# Patient Record
Sex: MALE | Race: WHITE | NOT HISPANIC OR LATINO | Employment: OTHER | URBAN - METROPOLITAN AREA
[De-identification: names, ages, dates, MRNs, and addresses within clinical notes are randomized per-mention and may not be internally consistent; named-entity substitution may affect disease eponyms.]

---

## 2017-04-17 ENCOUNTER — TELEPHONE (OUTPATIENT)
Dept: FAMILY MEDICINE | Facility: CLINIC | Age: 62
End: 2017-04-17

## 2017-04-17 DIAGNOSIS — Z12.11 SCREEN FOR COLON CANCER: Primary | ICD-10-CM

## 2017-04-17 NOTE — TELEPHONE ENCOUNTER
Patient called for Health Maintenance, agreed to FIT test screening. Order placed for provider to sign. Patient will . Per patient is requesting a call when kit is ready for .

## 2017-05-26 DIAGNOSIS — G89.29 OTHER CHRONIC PAIN: ICD-10-CM

## 2017-05-26 DIAGNOSIS — F51.01 PRIMARY INSOMNIA: ICD-10-CM

## 2017-05-26 RX ORDER — ZOLPIDEM TARTRATE 10 MG/1
10 TABLET ORAL
Qty: 30 TABLET | Refills: 0 | Status: SHIPPED | OUTPATIENT
Start: 2017-05-26 | End: 2017-07-19

## 2017-05-26 NOTE — TELEPHONE ENCOUNTER
Routing refill request to provider for review/approval because:  Drug not on the [facility] refill protocol   : 5.26.17, no problems noted    Chuyita Antonio RN, BS  Clinical Nurse Triage.

## 2017-05-26 NOTE — TELEPHONE ENCOUNTER
Controlled Substance Refill Request for zolpidem (AMBIEN) 10 MG tablet  Problem List Complete:  No     PROVIDER TO CONSIDER COMPLETION OF PROBLEM LIST AND OVERVIEW/CONTROLLED SUBSTANCE AGREEMENT    Last Written Prescription Date:  11/1/16  Last Fill Quantity: 30,   # refills: 5    Last Office Visit with [facility] primary care provider: 12/2/2016      Future Office visit:     Controlled substance agreement on file: Yes:  Date 6/23/16.     Processing:  Fax Rx to [facility] pharmacy   checked in past 6 months?  No, route to BLADIMIR Mensah  May 26, 2017  8:22 AM

## 2017-05-30 ENCOUNTER — TELEPHONE (OUTPATIENT)
Dept: FAMILY MEDICINE | Facility: CLINIC | Age: 62
End: 2017-05-30

## 2017-05-30 NOTE — TELEPHONE ENCOUNTER
Panel Management Review      Patient has the following on his problem list: None      Composite cancer screening  Chart review shows that this patient is due/due soon for the following Colonoscopy  Summary:    Patient is due/failing the following:   ACT, COLONOSCOPY and PHQ9    Action needed:   Patient needs to do ACT., Patient needs to do PHQ9. and Patient needs referral/order: colonoscopy    Type of outreach:    Phone, spoke to patient.  Patient will do the FIT test and will do the ACT questionaire at the next visit.  Did the PHQ-9 over the phone with the patient.    Questions for provider review:    None                                                                                                                                    Orion Bobo [facility]        Chart routed to none .

## 2017-05-31 DIAGNOSIS — J45.31 MILD PERSISTENT ASTHMA WITH EXACERBATION: ICD-10-CM

## 2017-05-31 ASSESSMENT — PATIENT HEALTH QUESTIONNAIRE - PHQ9: SUM OF ALL RESPONSES TO PHQ QUESTIONS 1-9: 3

## 2017-05-31 NOTE — TELEPHONE ENCOUNTER
fluticasone (FLONASE) 50 MCG/ACT spray  Last Written Prescription Date: 3/21/16  Last Fill Quantity: 16g  ,  # refills: 5   Last Office Visit with [facility], [facility] or [facility] prescribing provider:  12/2/2016                                              BLADIMIR Robbins  May 31, 2017  10:15 AM

## 2017-06-02 RX ORDER — FLUTICASONE PROPIONATE 50 MCG
SPRAY, SUSPENSION (ML) NASAL
Qty: 16 ML | Refills: 3 | Status: SHIPPED | OUTPATIENT
Start: 2017-06-02 | End: 2018-09-04

## 2017-06-02 NOTE — TELEPHONE ENCOUNTER
Prescription approved per [facility] Refill Protocol.  Yeni Loza RN, BSN

## 2017-06-14 PROCEDURE — 82274 ASSAY TEST FOR BLOOD FECAL: CPT | Performed by: FAMILY MEDICINE

## 2017-06-15 DIAGNOSIS — Z12.11 SCREEN FOR COLON CANCER: ICD-10-CM

## 2017-06-15 LAB — HEMOCCULT STL QL IA: NEGATIVE

## 2017-07-19 ENCOUNTER — OFFICE VISIT (OUTPATIENT)
Dept: FAMILY MEDICINE | Facility: CLINIC | Age: 62
End: 2017-07-19
Payer: COMMERCIAL

## 2017-07-19 ENCOUNTER — RADIANT APPOINTMENT (OUTPATIENT)
Dept: GENERAL RADIOLOGY | Facility: CLINIC | Age: 62
End: 2017-07-19
Attending: FAMILY MEDICINE
Payer: COMMERCIAL

## 2017-07-19 VITALS
HEIGHT: 74 IN | WEIGHT: 209.2 LBS | RESPIRATION RATE: 14 BRPM | BODY MASS INDEX: 26.85 KG/M2 | TEMPERATURE: 97.5 F | HEART RATE: 84 BPM | DIASTOLIC BLOOD PRESSURE: 73 MMHG | OXYGEN SATURATION: 97 % | SYSTOLIC BLOOD PRESSURE: 115 MMHG

## 2017-07-19 DIAGNOSIS — F51.01 PRIMARY INSOMNIA: ICD-10-CM

## 2017-07-19 DIAGNOSIS — M54.2 NECK PAIN: Primary | ICD-10-CM

## 2017-07-19 DIAGNOSIS — J45.30 MILD PERSISTENT CHRONIC ASTHMA WITHOUT COMPLICATION: ICD-10-CM

## 2017-07-19 DIAGNOSIS — N52.1 ERECTILE DYSFUNCTION DUE TO DISEASES CLASSIFIED ELSEWHERE: ICD-10-CM

## 2017-07-19 DIAGNOSIS — M54.40 LUMBAGO OF LUMBAR REGION WITH SCIATICA: ICD-10-CM

## 2017-07-19 PROCEDURE — 72040 X-RAY EXAM NECK SPINE 2-3 VW: CPT

## 2017-07-19 PROCEDURE — 99214 OFFICE O/P EST MOD 30 MIN: CPT | Performed by: FAMILY MEDICINE

## 2017-07-19 RX ORDER — SILDENAFIL CITRATE 20 MG/1
TABLET ORAL
Qty: 60 TABLET | Refills: 3 | Status: SHIPPED | OUTPATIENT
Start: 2017-07-19 | End: 2018-09-05

## 2017-07-19 RX ORDER — CARISOPRODOL 350 MG/1
350 TABLET ORAL 3 TIMES DAILY PRN
Qty: 30 TABLET | Refills: 0 | Status: SHIPPED | OUTPATIENT
Start: 2017-07-19 | End: 2017-12-05

## 2017-07-19 RX ORDER — HYDROCODONE BITARTRATE AND ACETAMINOPHEN 5; 325 MG/1; MG/1
1 TABLET ORAL EVERY 6 HOURS PRN
Qty: 45 TABLET | Refills: 0 | Status: SHIPPED | OUTPATIENT
Start: 2017-07-19 | End: 2018-09-04

## 2017-07-19 RX ORDER — ZOLPIDEM TARTRATE 10 MG/1
10 TABLET ORAL
Qty: 30 TABLET | Refills: 0 | Status: SHIPPED | OUTPATIENT
Start: 2017-07-19 | End: 2017-07-19

## 2017-07-19 RX ORDER — ZOLPIDEM TARTRATE 10 MG/1
10 TABLET ORAL
Qty: 30 TABLET | Refills: 3 | Status: SHIPPED | OUTPATIENT
Start: 2017-07-19 | End: 2018-09-04

## 2017-07-19 ASSESSMENT — ANXIETY QUESTIONNAIRES
1. FEELING NERVOUS, ANXIOUS, OR ON EDGE: NOT AT ALL
6. BECOMING EASILY ANNOYED OR IRRITABLE: NOT AT ALL
5. BEING SO RESTLESS THAT IT IS HARD TO SIT STILL: NOT AT ALL
2. NOT BEING ABLE TO STOP OR CONTROL WORRYING: NOT AT ALL
GAD7 TOTAL SCORE: 0
IF YOU CHECKED OFF ANY PROBLEMS ON THIS QUESTIONNAIRE, HOW DIFFICULT HAVE THESE PROBLEMS MADE IT FOR YOU TO DO YOUR WORK, TAKE CARE OF THINGS AT HOME, OR GET ALONG WITH OTHER PEOPLE: NOT DIFFICULT AT ALL
3. WORRYING TOO MUCH ABOUT DIFFERENT THINGS: NOT AT ALL
7. FEELING AFRAID AS IF SOMETHING AWFUL MIGHT HAPPEN: NOT AT ALL

## 2017-07-19 ASSESSMENT — PATIENT HEALTH QUESTIONNAIRE - PHQ9: 5. POOR APPETITE OR OVEREATING: NOT AT ALL

## 2017-07-19 NOTE — MR AVS SNAPSHOT
After Visit Summary   7/19/2017    Christopher Buchanan    MRN: [id number]           Patient Information     Date Of Birth          1955        Visit Information        Provider Department      7/19/2017 10:15 AM Florentino Chavez MD [facility]        Today's Diagnoses     Neck pain    -  1    Mild persistent chronic asthma without complication        Primary insomnia           Follow-ups after your visit        Additional Services     ASTHMA EDUCATION REFERRAL         Please be aware that coverage of these services is subject to the terms and limitations of your health insurance plan.  Call member services at your health plan with any benefit or coverage questions.      Please bring the following to your appointment:     >>   List of current medications  >>   This referral request   >>   Any documents/labs given to you for this referral  Your spacer device and/or peak meter if you have one                  Who to contact     If you have questions or need follow up information about today's clinic visit or your schedule please contact [facility] directly at [phone].  Normal or non-critical lab and imaging results will be communicated to you by MyChart, letter or phone within 4 business days after the clinic has received the results. If you do not hear from us within 7 days, please contact the clinic through [employer] or phone. If you have a critical or abnormal lab result, we will notify you by phone as soon as possible.  Submit refill requests through [employer] or call your pharmacy and they will forward the refill request to us. Please allow 3 business days for your refill to be completed.          Additional Information About Your Visit        MyChart Information     [employer] gives you secure access to your electronic health record. If you see a primary care provider, you can also send messages to your care team and make appointments. If you have questions,  "please call your primary care clinic.  If you do not have a primary care provider, please call [phone] and they will assist you.        Care EveryWhere ID     This is your Care EveryWhere ID. This could be used by other organizations to access your [facility] medical records  [id number]        Your Vitals Were     Pulse Temperature Respirations Height Pulse Oximetry BMI (Body Mass Index)    84 97.5  F (36.4  C) (Oral) 14 6' 1.75\" (1.873 m) 97% 27.04 kg/m2       Blood Pressure from Last 3 Encounters:   07/19/17 115/73   12/02/16 118/70   11/01/16 130/74    Weight from Last 3 Encounters:   07/19/17 209 lb 3.2 oz (94.9 kg)   12/02/16 214 lb (97.1 kg)   11/01/16 264 lb (119.7 kg)              We Performed the Following     ASTHMA EDUCATION REFERRAL     XR Cervical Spine 2/3 Views          Today's Medication Changes          These changes are accurate as of: 7/19/17 11:23 AM.  If you have any questions, ask your nurse or doctor.               Start taking these medicines.        Dose/Directions    carisoprodol 350 MG tablet   Commonly known as:  SOMA   Used for:  Neck pain   Started by:  Florentino Chavez MD        Dose:  350 mg   Take 1 tablet (350 mg) by mouth 3 times daily as needed for muscle spasms   Quantity:  30 tablet   Refills:  0            Where to get your medicines      Some of these will need a paper prescription and others can be bought over the counter.  Ask your nurse if you have questions.     Bring a paper prescription for each of these medications     carisoprodol 350 MG tablet    zolpidem 10 MG tablet                Primary Care Provider Office Phone # Fax #    Florentino Chavez [phone]       [address] [address]        Equal Access to Services     CAREY FOSTER: Georgie leeo Sokalie, waaxda luqadaha, qaybta kaalmada adeegyada, henrietta foster. So [facility] [phone].    ATENCIÓN: Si " francesco leong, tiene a sanabria disposición servicios gratuitos de asistencia lingüística. Jorgito ward [phone].    We comply with applicable federal civil rights laws and [place] laws. We do not discriminate on the basis of race, color, national origin, age, disability sex, sexual orientation or gender identity.            Thank you!     Thank you for choosing [facility]  for your care. Our goal is always to provide you with excellent care. Hearing back from our patients is one way we can continue to improve our services. Please take a few minutes to complete the written survey that you may receive in the mail after your visit with us. Thank you!             Your Updated Medication List - Protect others around you: Learn how to safely use, store and throw away your medicines at www.disposemymeds.org.          This list is accurate as of: 7/19/17 11:23 AM.  Always use your most recent med list.                   Brand Name Dispense Instructions for use Diagnosis    albuterol 108 (90 BASE) MCG/ACT Inhaler    albuterol    1 Inhaler    Inhale 1-2 puffs into the lungs every 4 hours as needed for shortness of breath / dyspnea    Moderate persistent chronic asthma with acute exacerbation       budesonide 180 MCG/ACT inhaler    PULMICORT FLEXHALER    1 Inhaler    Inhale 2 puffs into the lungs 2 times daily May reduce to one puff twice daily when improved    Seasonal allergic rhinitis, unspecified allergic rhinitis trigger       carisoprodol 350 MG tablet    SOMA    30 tablet    Take 1 tablet (350 mg) by mouth 3 times daily as needed for muscle spasms    Neck pain       fluticasone 50 MCG/ACT spray    FLONASE    16 mL    SHAKE WELL AND USE 2 SPRAYS IN EACH NOSTRIL DAILY    Mild persistent asthma with exacerbation       HYDROcodone-acetaminophen 5-325 MG per tablet    NORCO    45 tablet    Take 1 tablet by mouth every 6 hours as needed for moderate to severe pain    Lumbago of lumbar region with sciatica        ibuprofen 600 MG tablet    ADVIL/MOTRIN    90 tablet    Take 1 tablet (600 mg) by mouth every 6 hours as needed for moderate pain    Lumbago of lumbar region with sciatica       MELOXICAM PO      Take 15 mg by mouth daily        methocarbamol 750 MG tablet    ROBAXIN    90 tablet    Take 1 tablet (750 mg) by mouth 4 times daily as needed for muscle spasms    Lumbago of lumbar region with sciatica       sildenafil 20 MG tablet    REVATIO/VIAGRA    30 tablet    Take one or two one hour before intercourse    Erectile dysfunction due to diseases classified elsewhere       zolpidem 10 MG tablet    AMBIEN    30 tablet    Take 1 tablet (10 mg) by mouth nightly as needed SEE MD IN JUNE    Primary insomnia

## 2017-07-19 NOTE — PROGRESS NOTES
SUBJECTIVE:                                                    Christopher Buchanan is a 61 year old male who presents to clinic today for the following health issues:      Neck Pain      Duration: 3 days    Description:  Location: back of neck  Radiation: into the right shoulder and right upper arm and scapula     Intensity:  9/10    Accompanying signs and symptoms: very sharp pain    History (similar episodes/previous evaluation): yes, but not to this degree    Precipitating or alleviating factors: None    Therapies tried and outcome: Ibuprofen did not help        Patient is wondering if he should be taking the Meloxicam.    Problem list and histories reviewed & adjusted, as indicated.  Additional history:     Current Outpatient Prescriptions   Medication Sig Dispense Refill     fluticasone (FLONASE) 50 MCG/ACT spray SHAKE WELL AND USE 2 SPRAYS IN EACH NOSTRIL DAILY 16 mL 3     zolpidem (AMBIEN) 10 MG tablet Take 1 tablet (10 mg) by mouth nightly as needed SEE MD IN JUNE 30 tablet 0     sildenafil (REVATIO/VIAGRA) 20 MG tablet Take one or two one hour before intercourse 30 tablet 0     MELOXICAM PO Take 15 mg by mouth daily       budesonide (PULMICORT FLEXHALER) 180 MCG/ACT inhaler Inhale 2 puffs into the lungs 2 times daily May reduce to one puff twice daily when improved 1 Inhaler 11     albuterol (ALBUTEROL) 108 (90 BASE) MCG/ACT inhaler Inhale 1-2 puffs into the lungs every 4 hours as needed for shortness of breath / dyspnea 1 Inhaler 11     methocarbamol (ROBAXIN) 750 MG tablet Take 1 tablet (750 mg) by mouth 4 times daily as needed for muscle spasms 90 tablet 1     HYDROcodone-acetaminophen (NORCO) 5-325 MG per tablet Take 1 tablet by mouth every 6 hours as needed for moderate to severe pain 45 tablet 0     ibuprofen (ADVIL,MOTRIN) 600 MG tablet Take 1 tablet (600 mg) by mouth every 6 hours as needed for moderate pain 90 tablet 2       Reviewed and updated as needed this visit by clinical staff     Reviewed and  "updated as needed this visit by Provider         ROS:  Constitutional, HEENT, cardiovascular, pulmonary, GI, , musculoskeletal, neuro, skin, endocrine and psych systems are negative, except as otherwise noted.      OBJECTIVE:   /73 (BP Location: Left arm, Patient Position: Chair, Cuff Size: Adult Large)  Pulse 84  Temp 97.5  F (36.4  C) (Oral)  Resp 14  Ht 6' 1.75\" (1.873 m)  Wt 209 lb 3.2 oz (94.9 kg)  SpO2 97%  BMI 27.04 kg/m2  Body mass index is 27.04 kg/(m^2).  GENERAL: healthy, alert and no distress  EYES: Eyes grossly normal to inspection, PERRL and conjunctivae and sclerae normal  HENT: ear canals and TM's normal, nose and mouth without ulcers or lesions  NECK: no adenopathy, no asymmetry, masses, or scars and thyroid normal to palpation  RESP: lungs clear to auscultation - no rales, rhonchi or wheezes  CV: regular rate and rhythm, normal S1 S2, no S3 or S4, no murmur, click or rub, no peripheral edema and peripheral pulses strong  ABDOMEN: soft, nontender, no hepatosplenomegaly, no masses and bowel sounds normal  MS: no gross musculoskeletal defects noted, no edema  SKIN: no suspicious lesions or rashes  NEURO: Normal strength and tone, mentation intact and speech normal  PSYCH: mentation appears normal, affect normal/bright        ASSESSMENT/PLAN:     (M54.2) Neck pain  (primary encounter diagnosis)  Comment:   Plan: XR Cervical Spine 2/3 Views, carisoprodol         (SOMA) 350 MG tablet            (J45.30) Mild persistent chronic asthma without complication  Comment:   Plan: ASTHMA EDUCATION REFERRAL            (F51.01) Primary insomnia  Comment:   Plan: zolpidem (AMBIEN) 10 MG tablet, DISCONTINUED:         zolpidem (AMBIEN) 10 MG tablet            (N52.1) Erectile dysfunction due to diseases classified elsewhere  Comment:   Plan: sildenafil (REVATIO/VIAGRA) 20 MG tablet            (M54.40) Lumbago of lumbar region with sciatica  Comment:   Plan: HYDROcodone-acetaminophen (NORCO) 5-325 MG per "         tablet        Very intermittent use, review CSA              Current Outpatient Prescriptions   Medication     carisoprodol (SOMA) 350 MG tablet     sildenafil (REVATIO/VIAGRA) 20 MG tablet     HYDROcodone-acetaminophen (NORCO) 5-325 MG per tablet     zolpidem (AMBIEN) 10 MG tablet     fluticasone (FLONASE) 50 MCG/ACT spray     sildenafil (REVATIO/VIAGRA) 20 MG tablet     MELOXICAM PO     budesonide (PULMICORT FLEXHALER) 180 MCG/ACT inhaler     albuterol (ALBUTEROL) 108 (90 BASE) MCG/ACT inhaler     methocarbamol (ROBAXIN) 750 MG tablet     ibuprofen (ADVIL,MOTRIN) 600 MG tablet     No current facility-administered medications for this visit.      Discussed ED issues, he'll try the 20 mg viagra for cost reasons:   Erectile Dysfuntion                    Florentino Chavez MD  [facility]

## 2017-07-19 NOTE — NURSING NOTE
"Chief Complaint   Patient presents with     Pain       Initial /73 (BP Location: Left arm, Patient Position: Chair, Cuff Size: Adult Large)  Pulse 84  Temp 97.5  F (36.4  C) (Oral)  Resp 14  Ht 6' 1.75\" (1.873 m)  Wt 209 lb 3.2 oz (94.9 kg)  SpO2 97%  BMI 27.04 kg/m2 Estimated body mass index is 27.04 kg/(m^2) as calculated from the following:    Height as of this encounter: 6' 1.75\" (1.873 m).    Weight as of this encounter: 209 lb 3.2 oz (94.9 kg).  Medication Reconciliation: complete   Orion Bobo CMA       "

## 2017-07-20 ASSESSMENT — ASTHMA QUESTIONNAIRES: ACT_TOTALSCORE: 20

## 2017-07-20 ASSESSMENT — ANXIETY QUESTIONNAIRES: GAD7 TOTAL SCORE: 0

## 2017-07-21 ENCOUNTER — MYC MEDICAL ADVICE (OUTPATIENT)
Dept: FAMILY MEDICINE | Facility: CLINIC | Age: 62
End: 2017-07-21

## 2017-07-21 ENCOUNTER — TELEPHONE (OUTPATIENT)
Dept: FAMILY MEDICINE | Facility: CLINIC | Age: 62
End: 2017-07-21

## 2017-07-21 NOTE — TELEPHONE ENCOUNTER
Fax from [employer], informs revatio/viagra NOT covered, only covers for diagnosis of pulmonary HYPERTENSION, assume pt paying cash, FYI to JESSICA RIVERA MD, sent pt [employer] message informing  Alix Weaver RN, BSN  Message handled by Nurse Triage.

## 2017-07-21 NOTE — TELEPHONE ENCOUNTER
Florentino Chavez MD, see Mychart update from 7/19/17 appointment, inform pt of plan via Mychart    IMPRESSION:  Degenerative changes C4-C7, most prominent at C5-C6 where  there is disc space narrowing and anterior and posterior endplate  spurring. Some loss of normal cervical lordosis could be due to  degenerative changes, positioning or spasm.   Alix Weaver RN, BSN  Message handled by Nurse Triage.

## 2017-07-29 ENCOUNTER — MYC MEDICAL ADVICE (OUTPATIENT)
Dept: FAMILY MEDICINE | Facility: CLINIC | Age: 62
End: 2017-07-29

## 2017-07-29 ENCOUNTER — TELEPHONE (OUTPATIENT)
Dept: FAMILY MEDICINE | Facility: CLINIC | Age: 62
End: 2017-07-29

## 2017-07-29 DIAGNOSIS — M54.12 CERVICAL RADICULOPATHY: Primary | ICD-10-CM

## 2017-07-29 NOTE — TELEPHONE ENCOUNTER
"[facility] and messages below.  See below.  Thursday's message went to patient customer service and is not even viewable in chart.  Please advise.  Kaya Stockton RN    Thursday's message went to- [phone] Y    7/27/2017 11:50 AM Patient Cu* Patient Customer S*        Update referral needed?    Me        7/29/17 8:15 AM   Note      Patient calling and yelling that he is in pain and has not gotten any relief from Soma.  States he sent a message on Thursday and has not gotten a response back.  Advised we did not receive message on Thursday.  Advised we have message from 7/21/17 but states that is not the one.  Then goes into being upset he could not respond on that message.  He states it was routed somewhere but he was so upset I did not catch it.  The message is not in here.  Wanting steroids.  States is ready to go to ER.  Advised if pain is that bad LVUC is better option as ER not necessary for this situation but if in that much pain and Dr. Chavez not back til Monday this may be good option.  Patient angry he paid to see Dr. Chavez and would have to pay again and says \"I have been no help and thank you very little\" and hung-up.  OMAR Saravia Jeffrey Ralph, MD   to Christopher Buchanan           7/21/17 4:53 PM   They are describing pretty ordinary medium degenerative changes.  You could go to the Medical Spine Specialist at [facility] at any point, but flareups tend to last 6 weeks or more regardless of treatment. Time, rest, avoidence of overuse all do most of the curing and the medication just tames it down enough while we heal. If you want a referral we have good people at [facility] to be more aggressive (shots, steroids) if you want to try to push it faster. Let me know what you think..      Last read by Christopher Buchanan at 8:11 AM on 7/29/2017.             7/21/17 8:46 AM   Alix Weaver RN routed this conversation to Florentino Chavez MD Vogelgesang, Mary RN "   to Christopher Buchanan           7/21/17 8:46 AM   FYI, we have forwarded your message to Florentino Chavez MD. Also, we received a message from your prescription plan they do not cover revatio/viagra. You will have to pay cash. You are probably already aware. Thanks, Alix NELSON      Last read by Christopher Buchanan at 12:24 PM on 7/21/2017.    Alix Weaver RN        7/21/17 8:44 AM   Note       Florenitno Chavez MD, see Mychart update from 7/19/17 appointment, inform pt of plan via Mychart      IMPRESSION:  Degenerative changes C4-C7, most prominent at C5-C6 where  there is disc space narrowing and anterior and posterior endplate  spurring. Some loss of normal cervical lordosis could be due to  degenerative changes, positioning or spasm.   Alix Weaver RN, BSN  Message handled by Nurse Triage.             Christopher Buchanan   to Florentino Chavez MD           7/21/17 5:44 AM   I'm still experiencing severe pain, now extending down from my shoulder and into my arm & hand. How long until you expect this to subside and what is the longtime prognosis with the degeneration - is my spine mentioned by the radiologist in my test results?  When do I need to do follow up or is referral needed for specialist if the pain continues and is not controllable with currently prescribed medications?

## 2017-07-29 NOTE — TELEPHONE ENCOUNTER
"Patient calling and yelling that he is in pain and has not gotten any relief from Soma.  States he sent a message on Thursday and has not gotten a response back.  Advised we did not receive message on Thursday.  Advised we have message from 7/21/17 but states that is not the one.  Then goes into being upset he could not respond on that message.  He states it was routed somewhere but he was so upset I did not catch it.  The message is not in here.  Wanting steroids.  States is ready to go to ER.  Advised if pain is that bad LVUC is better option as ER not necessary for this situation but if in that much pain and Dr. Chavez not back til Monday this may be good option.  Patient angry he paid to see Dr. Chavez and would have to pay again and says \"I have been no help and thank you very little\" and hung-up.  OMAR Saravia Jeffrey Ralph, MD   to Christopher Buchanan           7/21/17 4:53 PM   They are describing pretty ordinary medium degenerative changes.  You could go to the Medical Spine Specialist at [facility] at any point, but flareups tend to last 6 weeks or more regardless of treatment. Time, rest, avoidence of overuse all do most of the curing and the medication just tames it down enough while we heal. If you want a referral we have good people at [facility] to be more aggressive (shots, steroids) if you want to try to push it faster. Let me know what you think..      Last read by Christopher Buchanan at 8:11 AM on 7/29/2017.             7/21/17 8:46 AM   Alix Weaver RN routed this conversation to Florentino Chavez MD Vogelgesang, Mary, RN   to Christopher Buchanan           7/21/17 8:46 AM   FYI, we have forwarded your message to Florentino Chavez MD. Also, we received a message from your prescription plan they do not cover revatio/viagra. You will have to pay cash. You are probably already aware. Caio, Alix NELSON      Last read by Christopher Buchanan at 12:24 PM on 7/21/2017.    Alix Weaver RN        " 7/21/17 8:44 AM   Note      Florentino Chavez MD, see Mychart update from 7/19/17 appointment, inform pt of plan via Mychart     IMPRESSION:  Degenerative changes C4-C7, most prominent at C5-C6 where  there is disc space narrowing and anterior and posterior endplate  spurring. Some loss of normal cervical lordosis could be due to  degenerative changes, positioning or spasm.   Alix Weaver, RN, BSN  Message handled by Nurse Triage.            Christopher Buchanan   to Florentino Chavez MD           7/21/17 5:44 AM   I'm still experiencing severe pain, now extending down from my shoulder and into my arm & hand. How long until you expect this to subside and what is the longtime prognosis with the degeneration - is my spine mentioned by the radiologist in my test results?  When do I need to do follow up or is referral needed for specialist if the pain continues and is not controllable with currently prescribed medications?

## 2017-07-31 RX ORDER — PREDNISONE 20 MG/1
40 TABLET ORAL DAILY
Qty: 18 TABLET | Refills: 0 | Status: SHIPPED | OUTPATIENT
Start: 2017-07-31 | End: 2017-12-05

## 2017-08-01 NOTE — TELEPHONE ENCOUNTER
Clarification to [place] on prednisone rx, consulted with Dr Chavez, should be 5 days at 2 tabs, 5 days at 1 tab then 5 days for 1/2 tab  Chuyita Antonio RN, BS  Clinical Nurse Triage.

## 2017-10-24 DIAGNOSIS — M54.40 LUMBAGO OF LUMBAR REGION WITH SCIATICA: ICD-10-CM

## 2017-10-24 RX ORDER — METHOCARBAMOL 750 MG/1
TABLET, FILM COATED ORAL
Qty: 90 TABLET | Refills: 0 | Status: SHIPPED | OUTPATIENT
Start: 2017-10-24 | End: 2017-12-05

## 2017-10-24 NOTE — TELEPHONE ENCOUNTER
Routing refill request to provider for review/approval because:  Drug not on the [facility] refill protocol     Bonnie Buck RN    Methocarbamol   Last Written Prescription Date: 7/6/16  Last Quantity: 90, # refills: 1  Last Office Visit with [facility], [facility] or [facility] prescribing provider: 7/19/17       Creatinine   Date Value Ref Range Status   12/07/2016 0.94 0.66 - 1.25 mg/dL Final     Lab Results   Component Value Date    AST 14 12/07/2016     Lab Results   Component Value Date    ALT 27 12/07/2016     BP Readings from Last 3 Encounters:   07/19/17 115/73   12/02/16 118/70   11/01/16 130/74

## 2017-11-02 ENCOUNTER — TRANSFERRED RECORDS (OUTPATIENT)
Dept: HEALTH INFORMATION MANAGEMENT | Facility: CLINIC | Age: 62
End: 2017-11-02

## 2017-12-05 ENCOUNTER — OFFICE VISIT (OUTPATIENT)
Dept: FAMILY MEDICINE | Facility: CLINIC | Age: 62
End: 2017-12-05
Payer: COMMERCIAL

## 2017-12-05 VITALS
WEIGHT: 211.1 LBS | TEMPERATURE: 98 F | DIASTOLIC BLOOD PRESSURE: 78 MMHG | HEIGHT: 74 IN | SYSTOLIC BLOOD PRESSURE: 138 MMHG | OXYGEN SATURATION: 97 % | BODY MASS INDEX: 27.09 KG/M2 | RESPIRATION RATE: 14 BRPM | HEART RATE: 53 BPM

## 2017-12-05 DIAGNOSIS — M54.2 NECK PAIN: Primary | ICD-10-CM

## 2017-12-05 PROCEDURE — 99214 OFFICE O/P EST MOD 30 MIN: CPT | Performed by: FAMILY MEDICINE

## 2017-12-05 RX ORDER — CYCLOBENZAPRINE HCL 10 MG
10 TABLET ORAL 3 TIMES DAILY PRN
Qty: 90 TABLET | Refills: 1 | Status: SHIPPED | OUTPATIENT
Start: 2017-12-05 | End: 2018-09-04

## 2017-12-05 NOTE — MR AVS SNAPSHOT
"              After Visit Summary   12/5/2017    Christopher Buchanan    MRN: [id number]           Patient Information     Date Of Birth          1955        Visit Information        Provider Department      12/5/2017 3:30 PM Florentino Chavez MD [facility]        Today's Diagnoses     Neck pain    -  1       Follow-ups after your visit        Who to contact     If you have questions or need follow up information about today's clinic visit or your schedule please contact [facility] directly at [phone].  Normal or non-critical lab and imaging results will be communicated to you by MyChart, letter or phone within 4 business days after the clinic has received the results. If you do not hear from us within 7 days, please contact the clinic through [employer] or phone. If you have a critical or abnormal lab result, we will notify you by phone as soon as possible.  Submit refill requests through [employer] or call your pharmacy and they will forward the refill request to us. Please allow 3 business days for your refill to be completed.          Additional Information About Your Visit        MyChart Information     [employer] gives you secure access to your electronic health record. If you see a primary care provider, you can also send messages to your care team and make appointments. If you have questions, please call your primary care clinic.  If you do not have a primary care provider, please call [phone] and they will assist you.        Care EveryWhere ID     This is your Care EveryWhere ID. This could be used by other organizations to access your [place] medical records  [id number]        Your Vitals Were     Pulse Temperature Respirations Height Pulse Oximetry BMI (Body Mass Index)    53 98  F (36.7  C) (Oral) 14 6' 1.75\" (1.873 m) 97% 27.29 kg/m2       Blood Pressure from Last 3 Encounters:   12/05/17 159/71   07/19/17 115/73   12/02/16 118/70    Weight from Last 3 " Encounters:   12/05/17 211 lb 1.6 oz (95.8 kg)   07/19/17 209 lb 3.2 oz (94.9 kg)   12/02/16 214 lb (97.1 kg)              Today, you had the following     No orders found for display         Today's Medication Changes          These changes are accurate as of: 12/5/17  4:08 PM.  If you have any questions, ask your nurse or doctor.               Start taking these medicines.        Dose/Directions    cyclobenzaprine 10 MG tablet   Commonly known as:  FLEXERIL   Used for:  Neck pain   Started by:  Floretnino Chavez MD        Dose:  10 mg   Take 1 tablet (10 mg) by mouth 3 times daily as needed for muscle spasms   Quantity:  90 tablet   Refills:  1       nabumetone 750 MG tablet   Commonly known as:  RELAFEN   Used for:  Neck pain   Started by:  Florentino Chavez MD        Dose:  750 mg   Take 1 tablet (750 mg) by mouth 2 times daily as needed for moderate pain   Quantity:  60 tablet   Refills:  1         Stop taking these medicines if you haven't already. Please contact your care team if you have questions.     carisoprodol 350 MG tablet   Commonly known as:  SOMA   Stopped by:  Florentino Chavez MD           methocarbamol 750 MG tablet   Commonly known as:  ROBAXIN   Stopped by:  Florentino Chavez MD           predniSONE 20 MG tablet   Commonly known as:  DELTASONE   Stopped by:  Florentino Chavez MD                Where to get your medicines      These medications were sent to [facility] [address] [place] [address] AT [address]  [address] [place] [id number]     Phone:  [phone]     cyclobenzaprine 10 MG tablet    nabumetone 750 MG tablet                Primary Care Provider Office Phone # Fax #    Florentino Chavez [phone] [address] [facility] [address]        Equal Access to Services     CAREY SHAVER AH: Georgie Bran, waaxda luqadaha, qaybta kaalrandall schofield, henrietta jung  mynorjonna dougjohn kourtneyjosefina laharmonycolby ah. So [facility] [phone].    ATENCIÓN: Si alliela salo, tiene a sanabria disposición servicios gratuitos de asistencia lingüística. Jorgito al [phone].    We comply with applicable federal civil rights laws and [place] laws. We do not discriminate on the basis of race, color, national origin, age, disability, sex, sexual orientation, or gender identity.            Thank you!     Thank you for choosing [facility]  for your care. Our goal is always to provide you with excellent care. Hearing back from our patients is one way we can continue to improve our services. Please take a few minutes to complete the written survey that you may receive in the mail after your visit with us. Thank you!             Your Updated Medication List - Protect others around you: Learn how to safely use, store and throw away your medicines at www.disposemymeds.org.          This list is accurate as of: 12/5/17  4:08 PM.  Always use your most recent med list.                   Brand Name Dispense Instructions for use Diagnosis    albuterol 108 (90 BASE) MCG/ACT Inhaler    PROAIR HFA    1 Inhaler    Inhale 1-2 puffs into the lungs every 4 hours as needed for shortness of breath / dyspnea    Moderate persistent chronic asthma with acute exacerbation       budesonide 180 MCG/ACT inhaler    PULMICORT FLEXHALER    1 Inhaler    Inhale 2 puffs into the lungs 2 times daily May reduce to one puff twice daily when improved    Seasonal allergic rhinitis, unspecified allergic rhinitis trigger       cyclobenzaprine 10 MG tablet    FLEXERIL    90 tablet    Take 1 tablet (10 mg) by mouth 3 times daily as needed for muscle spasms    Neck pain       fluticasone 50 MCG/ACT spray    FLONASE    16 mL    SHAKE WELL AND USE 2 SPRAYS IN EACH NOSTRIL DAILY    Mild persistent asthma with exacerbation       HYDROcodone-acetaminophen 5-325 MG per tablet    NORCO    45 tablet    Take 1 tablet by mouth every 6 hours as  needed for moderate to severe pain    Lumbago of lumbar region with sciatica       ibuprofen 600 MG tablet    ADVIL/MOTRIN    90 tablet    Take 1 tablet (600 mg) by mouth every 6 hours as needed for moderate pain    Lumbago of lumbar region with sciatica       MELOXICAM PO      Take 15 mg by mouth daily        nabumetone 750 MG tablet    RELAFEN    60 tablet    Take 1 tablet (750 mg) by mouth 2 times daily as needed for moderate pain    Neck pain       * sildenafil 20 MG tablet    REVATIO    30 tablet    Take one or two one hour before intercourse    Erectile dysfunction due to diseases classified elsewhere       * sildenafil 20 MG tablet    REVATIO    60 tablet    Take one to 3 tablets one hour before sex    Erectile dysfunction due to diseases classified elsewhere       zolpidem 10 MG tablet    AMBIEN    30 tablet    Take 1 tablet (10 mg) by mouth nightly as needed SEE MD IN JUNE    Primary insomnia       * Notice:  This list has 2 medication(s) that are the same as other medications prescribed for you. Read the directions carefully, and ask your doctor or other care provider to review them with you.

## 2017-12-05 NOTE — PROGRESS NOTES
"  SUBJECTIVE:   Christopher Buchanan is a 62 year old male who presents to clinic today for the following health issues:      Patient is here for multiple concerns including his back, hip, and left knee.  He would like to discuss cervical surgery and get Dr. Chavez's opinion on it.    Back Subjective:         Symptoms began:   1 year(s) ago       Symptoms changing:  onset gradual and are worse.                  Location:  neck right region       Radiation to right arm weak triceps and pain and tingling        At worst a 7 on a scale of 1-10.         Personal hx of back pain is:  recurrent self limited episodes of low back pain in the past.       Pain is exacerbated by: lifting.       Pain is relieved by: rest.       Associated sx include: tingling.       Previous plain films obtained: Yes.        Results: ddd, mri =disk.       Red flag symptoms: negative, weakness.           ROS:  Constitutional, HEENT, cardiovascular, pulmonary, GI, , musculoskeletal, neuro, skin, endocrine and psych systems are negative, except as otherwise noted.      OBJECTIVE:   /78  Pulse 53  Temp 98  F (36.7  C) (Oral)  Resp 14  Ht 6' 1.75\" (1.873 m)  Wt 211 lb 1.6 oz (95.8 kg)  SpO2 97%  BMI 27.29 kg/m2  Body mass index is 27.29 kg/(m^2).  GENERAL: healthy, alert and no distress  EYES: Eyes grossly normal to inspection, PERRL and conjunctivae and sclerae normal  HENT: ear canals and TM's normal, nose and mouth without ulcers or lesions  NECK: no adenopathy, no asymmetry, masses, or scars and thyroid normal to palpation  RESP: lungs clear to auscultation - no rales, rhonchi or wheezes  CV: regular rate and rhythm, normal S1 S2, no S3 or S4, no murmur, click or rub, no peripheral edema and peripheral pulses strong  ABDOMEN: soft, nontender, no hepatosplenomegaly, no masses and bowel sounds normal  MS: no gross musculoskeletal defects noted, no edema  SKIN: no suspicious lesions or rashes  NEURO: Normal strength and tone, mentation " intact and speech normal  PSYCH: mentation appears normal, affect normal/bright    Diagnostic Test Results:  Reviewed his mri and [facility] results: cervical disk herniation    ASSESSMENT/PLAN:       (M54.2) Neck pain  (primary encounter diagnosis)  Comment:   Plan: cyclobenzaprine (FLEXERIL) 10 MG tablet,         nabumetone (RELAFEN) 750 MG tablet        With cervical disk will get surgery in January       Florentino Chavez MD  [facility]

## 2017-12-11 ENCOUNTER — MYC MEDICAL ADVICE (OUTPATIENT)
Dept: FAMILY MEDICINE | Facility: CLINIC | Age: 62
End: 2017-12-11

## 2017-12-11 NOTE — TELEPHONE ENCOUNTER
JESSICA RIVERA MD, see Mychart questions and advise    Alix Weaver RN, BSN  Message handled by Nurse Triage.

## 2018-01-08 ENCOUNTER — OFFICE VISIT (OUTPATIENT)
Dept: FAMILY MEDICINE | Facility: CLINIC | Age: 63
End: 2018-01-08
Payer: COMMERCIAL

## 2018-01-08 VITALS
SYSTOLIC BLOOD PRESSURE: 118 MMHG | BODY MASS INDEX: 27.4 KG/M2 | HEIGHT: 74 IN | TEMPERATURE: 97.8 F | WEIGHT: 213.5 LBS | OXYGEN SATURATION: 96 % | RESPIRATION RATE: 16 BRPM | HEART RATE: 95 BPM | DIASTOLIC BLOOD PRESSURE: 72 MMHG

## 2018-01-08 DIAGNOSIS — Z01.818 PREOP GENERAL PHYSICAL EXAM: Primary | ICD-10-CM

## 2018-01-08 LAB
ANION GAP SERPL CALCULATED.3IONS-SCNC: 4 MMOL/L (ref 3–14)
BUN SERPL-MCNC: 16 MG/DL (ref 7–30)
CALCIUM SERPL-MCNC: 8.6 MG/DL (ref 8.5–10.1)
CHLORIDE SERPL-SCNC: 104 MMOL/L (ref 94–109)
CO2 SERPL-SCNC: 32 MMOL/L (ref 20–32)
CREAT SERPL-MCNC: 0.87 MG/DL (ref 0.66–1.25)
ERYTHROCYTE [DISTWIDTH] IN BLOOD BY AUTOMATED COUNT: 12.5 % (ref 10–15)
GFR SERPL CREATININE-BSD FRML MDRD: 88 ML/MIN/1.7M2
GLUCOSE SERPL-MCNC: 77 MG/DL (ref 70–99)
HCT VFR BLD AUTO: 44.6 % (ref 40–53)
HGB BLD-MCNC: 15.2 G/DL (ref 13.3–17.7)
MCH RBC QN AUTO: 33.3 PG (ref 26.5–33)
MCHC RBC AUTO-ENTMCNC: 34.1 G/DL (ref 31.5–36.5)
MCV RBC AUTO: 98 FL (ref 78–100)
PLATELET # BLD AUTO: 197 10E9/L (ref 150–450)
POTASSIUM SERPL-SCNC: 4.2 MMOL/L (ref 3.4–5.3)
RBC # BLD AUTO: 4.56 10E12/L (ref 4.4–5.9)
SODIUM SERPL-SCNC: 140 MMOL/L (ref 133–144)
WBC # BLD AUTO: 8.4 10E9/L (ref 4–11)

## 2018-01-08 PROCEDURE — 36415 COLL VENOUS BLD VENIPUNCTURE: CPT | Performed by: FAMILY MEDICINE

## 2018-01-08 PROCEDURE — 99214 OFFICE O/P EST MOD 30 MIN: CPT | Performed by: FAMILY MEDICINE

## 2018-01-08 PROCEDURE — 80048 BASIC METABOLIC PNL TOTAL CA: CPT | Performed by: FAMILY MEDICINE

## 2018-01-08 PROCEDURE — 93000 ELECTROCARDIOGRAM COMPLETE: CPT | Performed by: FAMILY MEDICINE

## 2018-01-08 PROCEDURE — 85027 COMPLETE CBC AUTOMATED: CPT | Performed by: FAMILY MEDICINE

## 2018-01-08 NOTE — MR AVS SNAPSHOT
After Visit Summary   1/8/2018    Christopher Buchanan    MRN: [id number]           Patient Information     Date Of Birth          1955        Visit Information        Provider Department      1/8/2018 1:30 PM Florentino Chavez MD [facility]        Today's Diagnoses     Preop general physical exam    -  1      Care Instructions      Before Your Surgery      Call your surgeon if there is any change in your health. This includes signs of a cold or flu (such as a sore throat, runny nose, cough, rash or fever).    Do not smoke, drink alcohol or take over the counter medicine (unless your surgeon or primary care doctor tells you to) for the 24 hours before and after surgery.    If you take prescribed drugs: Follow your doctor s orders about which medicines to take and which to stop until after surgery.    Eating and drinking prior to surgery: follow the instructions from your surgeon    Take a shower or bath the night before surgery. Use the soap your surgeon gave you to gently clean your skin. If you do not have soap from your surgeon, use your regular soap. Do not shave or scrub the surgery site.  Wear clean pajamas and have clean sheets on your bed.           Follow-ups after your visit        Who to contact     If you have questions or need follow up information about today's clinic visit or your schedule please contact [facility] directly at [phone].  Normal or non-critical lab and imaging results will be communicated to you by MyChart, letter or phone within 4 business days after the clinic has received the results. If you do not hear from us within 7 days, please contact the clinic through MyChart or phone. If you have a critical or abnormal lab result, we will notify you by phone as soon as possible.  Submit refill requests through [employer] or call your pharmacy and they will forward the refill request to us. Please allow 3 business days for your refill  "to be completed.          Additional Information About Your Visit        MyChart Information     [employer] gives you secure access to your electronic health record. If you see a primary care provider, you can also send messages to your care team and make appointments. If you have questions, please call your primary care clinic.  If you do not have a primary care provider, please call [phone] and they will assist you.        Care EveryWhere ID     This is your Care EveryWhere ID. This could be used by other organizations to access your [place] medical records  [id number]        Your Vitals Were     Pulse Temperature Respirations Height Pulse Oximetry BMI (Body Mass Index)    95 97.8  F (36.6  C) (Oral) 16 6' 1.75\" (1.873 m) 96% 27.6 kg/m2       Blood Pressure from Last 3 Encounters:   01/08/18 118/72   12/05/17 138/78   07/19/17 115/73    Weight from Last 3 Encounters:   01/08/18 213 lb 8 oz (96.8 kg)   12/05/17 211 lb 1.6 oz (95.8 kg)   07/19/17 209 lb 3.2 oz (94.9 kg)              We Performed the Following     Basic metabolic panel  (Ca, Cl, CO2, Creat, Gluc, K, Na, BUN)     CBC with platelets     EKG 12-lead complete w/read - Clinics        Primary Care Provider Office Phone # Fax #    Florentino Silvino Chavez [phone] [address] [facility] [address]        Equal Access to Services     CAREY SHAVER : Hadii aad ku hadasho Soomaali, waaxda luqadaha, qaybta kaalmada dougegyalma, henrietta arellano . So [facility] [phone].    ATENCIÓN: Si habla salo, tiene a sanabria disposición servicios gratuitos de asistencia lingüística. Llame al [phone].    We comply with applicable federal civil rights laws and [place] laws. We do not discriminate on the basis of race, color, national origin, age, disability, sex, sexual orientation, or gender identity.            Thank you!     Thank you for choosing [facility]  for your care. Our goal is always to " provide you with excellent care. Hearing back from our patients is one way we can continue to improve our services. Please take a few minutes to complete the written survey that you may receive in the mail after your visit with us. Thank you!             Your Updated Medication List - Protect others around you: Learn how to safely use, store and throw away your medicines at www.disposemymeds.org.          This list is accurate as of: 1/8/18  2:03 PM.  Always use your most recent med list.                   Brand Name Dispense Instructions for use Diagnosis    albuterol 108 (90 BASE) MCG/ACT Inhaler    PROAIR HFA    1 Inhaler    Inhale 1-2 puffs into the lungs every 4 hours as needed for shortness of breath / dyspnea    Moderate persistent chronic asthma with acute exacerbation       ALEVE PO      Take 220 mg by mouth        budesonide 180 MCG/ACT inhaler    PULMICORT FLEXHALER    1 Inhaler    Inhale 2 puffs into the lungs 2 times daily May reduce to one puff twice daily when improved    Seasonal allergic rhinitis, unspecified allergic rhinitis trigger       cyclobenzaprine 10 MG tablet    FLEXERIL    90 tablet    Take 1 tablet (10 mg) by mouth 3 times daily as needed for muscle spasms    Neck pain       fluticasone 50 MCG/ACT spray    FLONASE    16 mL    SHAKE WELL AND USE 2 SPRAYS IN EACH NOSTRIL DAILY    Mild persistent asthma with exacerbation       HYDROcodone-acetaminophen 5-325 MG per tablet    NORCO    45 tablet    Take 1 tablet by mouth every 6 hours as needed for moderate to severe pain    Lumbago of lumbar region with sciatica       ibuprofen 600 MG tablet    ADVIL/MOTRIN    90 tablet    Take 1 tablet (600 mg) by mouth every 6 hours as needed for moderate pain    Lumbago of lumbar region with sciatica       MELOXICAM PO      Take 15 mg by mouth daily        nabumetone 750 MG tablet    RELAFEN    60 tablet    Take 1 tablet (750 mg) by mouth 2 times daily as needed for moderate pain    Neck pain       *  sildenafil 20 MG tablet    REVATIO    30 tablet    Take one or two one hour before intercourse    Erectile dysfunction due to diseases classified elsewhere       * sildenafil 20 MG tablet    REVATIO    60 tablet    Take one to 3 tablets one hour before sex    Erectile dysfunction due to diseases classified elsewhere       zolpidem 10 MG tablet    AMBIEN    30 tablet    Take 1 tablet (10 mg) by mouth nightly as needed SEE MD IN JUNE    Primary insomnia       * Notice:  This list has 2 medication(s) that are the same as other medications prescribed for you. Read the directions carefully, and ask your doctor or other care provider to review them with you.

## 2018-01-08 NOTE — PROGRESS NOTES
[facility]  [address] [facility] [address]  [phone]  Dept: [phone]    PRE-OP EVALUATION:  Today's date: 2018    Christopher Buchanan (: 1955) presents for pre-operative evaluation assessment as requested by Dr. Eduardo Muñiz .  He requires evaluation and anesthesia risk assessment prior to undergoing surgery/procedure for treatment of  .  Proposed procedure: Anterior    Date of Surgery/ Procedure: 18  Time of Surgery/ Procedure: 7:30   Hospital/Surgical Facility: [facility]   Fax number for surgical facility: [phone]  Primary Physician: Florentino Chavez  Type of Anesthesia Anticipated: to be determined    Patient has a Health Care Directive or Living Will:  YES     Preop Questions 2018   1.  Do you have a history of heart attack, stroke, stent, bypass or surgery on an artery in the head, neck, heart or legs? No   2.  Do you ever have any pain or discomfort in your chest? No   3.  Do you have a history of  Heart Failure? No   4.   Are you troubled by shortness of breath when:  walking on a level surface, or up a slight hill, or at night? No   5.  Do you currently have a cold, bronchitis or other respiratory infection? No   6.  Do you have a cough, shortness of breath, or wheezing? No   7.  Do you sometimes get pains in the calves of your legs when you walk? No   8. Do you or anyone in your family have previous history of blood clots? No   9.  Do you or does anyone in your family have a serious bleeding problem such as prolonged bleeding following surgeries or cuts? No   10. Have you ever had problems with anemia or been told to take iron pills? No   11. Have you had any abnormal blood loss such as black, tarry or bloody stools? No   12. Have you ever had a blood transfusion? No   13. Have you or any of your relatives ever had problems with anesthesia? No   14. Do you have sleep apnea, excessive snoring or daytime drowsiness? YES -     15. Do you have any prosthetic heart valves? No   16. Do you have prosthetic joints? No           HPI:                                                      Brief HPI related to upcoming procedure:           MEDICAL HISTORY:                                                    Patient Active Problem List    Diagnosis Date Noted     Paroxysmal supraventricular tachycardia (H) 12/02/2016     Priority: Medium     Hip joint painful on movement, unspecified laterality 02/18/2016     Priority: Medium     Other chronic pain 08/31/2015     Priority: Medium     Patient is followed by JESSICA RIVERA for ongoing prescription of pain medication.  All refills should be approved by this provider, or covering partner.    Medication(s): HYDROcodone-acetaminophen (NORCO) 5-325 MG per tablet.   Maximum quantity per month: 30  Clinic visit frequency required: Q 4 months     Controlled substance agreement on file: Yes       Date(s): 8/31/15    Pain Clinic evaluation in the past: No    DIRE Total Score(s):  No flowsheet data found.    Last [facility] website verification: done 5.26.17   [email or web address]         Right low back pain, with sciatica presence unspecified 07/10/2015     Priority: Medium     Erectile dysfunction 09/30/2011     Priority: Medium     Hydrocele, left 03/31/2011     Priority: Medium     Prostatism      Priority: Medium     Moderate persistent asthma 03/21/2011     Priority: Medium     CARDIOVASCULAR SCREENING; LDL GOAL LESS THAN 160 10/31/2010     Priority: Medium     Allergic rhinitis 05/08/2003     Priority: Medium     Problem list name updated by automated process. Provider to review       Generalized osteoarthrosis, unspecified site 05/08/2003     Priority: Medium     Esophageal reflux 05/08/2003     Priority: Medium      Past Medical History:   Diagnosis Date     ALLERGIC RHINITIS NOS 5/8/2003     CARDIOVASCULAR SCREENING; LDL GOAL LESS THAN 160 10/31/2010     Erectile dysfunction 9/30/2011      Esophageal reflux 5/8/2003     GENERAL OSTEOARTHROSIS 5/8/2003     Hydrocele      Hydrocele, left 3/31/2011     Low back pain 9/30/2011     Moderate persistent asthma 3/21/2011     Prostatism      Past Surgical History:   Procedure Laterality Date     C SPLINT      right thumb     EYE SURGERY      YAG procedure     Current Outpatient Prescriptions   Medication Sig Dispense Refill     cyclobenzaprine (FLEXERIL) 10 MG tablet Take 1 tablet (10 mg) by mouth 3 times daily as needed for muscle spasms 90 tablet 1     nabumetone (RELAFEN) 750 MG tablet Take 1 tablet (750 mg) by mouth 2 times daily as needed for moderate pain 60 tablet 1     sildenafil (REVATIO/VIAGRA) 20 MG tablet Take one to 3 tablets one hour before sex 60 tablet 3     HYDROcodone-acetaminophen (NORCO) 5-325 MG per tablet Take 1 tablet by mouth every 6 hours as needed for moderate to severe pain 45 tablet 0     zolpidem (AMBIEN) 10 MG tablet Take 1 tablet (10 mg) by mouth nightly as needed SEE MD IN JUNE 30 tablet 3     fluticasone (FLONASE) 50 MCG/ACT spray SHAKE WELL AND USE 2 SPRAYS IN EACH NOSTRIL DAILY 16 mL 3     sildenafil (REVATIO/VIAGRA) 20 MG tablet Take one or two one hour before intercourse 30 tablet 0     MELOXICAM PO Take 15 mg by mouth daily       budesonide (PULMICORT FLEXHALER) 180 MCG/ACT inhaler Inhale 2 puffs into the lungs 2 times daily May reduce to one puff twice daily when improved 1 Inhaler 11     albuterol (ALBUTEROL) 108 (90 BASE) MCG/ACT inhaler Inhale 1-2 puffs into the lungs every 4 hours as needed for shortness of breath / dyspnea 1 Inhaler 11     ibuprofen (ADVIL,MOTRIN) 600 MG tablet Take 1 tablet (600 mg) by mouth every 6 hours as needed for moderate pain 90 tablet 2     OTC products: None, except as noted above    Allergies   Allergen Reactions     Tizanidine Shortness Of Breath     Penicillins      Noted in 4/21/08 ER, as possible      Latex Allergy: NO    Social History   Substance Use Topics     Smoking status: Never  "Smoker     Smokeless tobacco: Never Used     Alcohol use Yes      Comment: 1 beer daily     History   Drug Use No       REVIEW OF SYSTEMS:                                                    C: NEGATIVE for fever, chills, change in weight  I: NEGATIVE for worrisome rashes, moles or lesions  E: NEGATIVE for vision changes or irritation  E/M: NEGATIVE for ear, mouth and throat problems  R: NEGATIVE for significant cough or SOB  CV: NEGATIVE for chest pain, palpitations or peripheral edema  GI: NEGATIVE for nausea, abdominal pain, heartburn, or change in bowel habits  : NEGATIVE for frequency, dysuria, or hematuria  M: NEGATIVE for significant arthralgias or myalgia  N: NEGATIVE for weakness, dizziness or paresthesias  E: NEGATIVE for temperature intolerance, skin/hair changes  H: NEGATIVE for bleeding problems  P: NEGATIVE for changes in mood or affect  /72 (BP Location: Right arm, Patient Position: Chair, Cuff Size: Adult Large)  Pulse 95  Temp 97.8  F (36.6  C) (Oral)  Resp 16  Ht 6' 1.75\" (1.873 m)  Wt 213 lb 8 oz (96.8 kg)  SpO2 96%  BMI 27.6 kg/m2    /72 (BP Location: Right arm, Patient Position: Chair, Cuff Size: Adult Large)  Pulse 95  Temp 97.8  F (36.6  C) (Oral)  Resp 16  Ht 6' 1.75\" (1.873 m)  Wt 213 lb 8 oz (96.8 kg)  SpO2 96%  BMI 27.6 kg/m2    GENERAL APPEARANCE: healthy, alert and no distress     EYES: EOMI,  PERRL     HENT: ear canals and TM's normal and nose and mouth without ulcers or lesions     NECK: no adenopathy, no asymmetry, masses, or scars and thyroid normal to palpation     RESP: lungs clear to auscultation - no rales, rhonchi or wheezes     CV: regular rates and rhythm, normal S1 S2, no S3 or S4 and no murmur, click or rub     ABDOMEN:  soft, nontender, no HSM or masses and bowel sounds normal     MS: extremities normal- no gross deformities noted, no evidence of inflammation in joints, FROM in all extremities.     SKIN: no suspicious lesions or rashes     NEURO: " Normal strength and tone, sensory exam grossly normal, mentation intact and speech normal     PSYCH: mentation appears normal. and affect normal/bright     LYMPHATICS: No axillary, cervical, or supraclavicular nodes    DIAGNOSTICS:                                                    EKG: appears normal, NSR, normal axis, normal intervals, no acute ST/T changes c/w ischemia, no LVH by voltage criteria, unchanged from previous tracings    Recent Labs   Lab Test  12/07/16   0844 12/10/15  07/22/15   0929   INR   --   0.93   --    NA  141   --   139   POTASSIUM  4.2  4.1  4.4   CR  0.94  0.97  0.88   A1C   --   5.2   --         IMPRESSION:                                                    Reason for surgery/procedure: neck decompression  Diagnosis/reason for consult: preop     The proposed surgical procedure is considered LOW risk.    REVISED CARDIAC RISK INDEX  The patient has the following serious cardiovascular risks for perioperative complications such as (MI, PE, VFib and 3  AV Block):  No serious cardiac risks  INTERPRETATION: 1 risks: Class II (low risk - 0.9% complication rate)    The patient has the following additional risks for perioperative complications:  No identified additional risks  Cervical spondylosis     RECOMMENDATIONS:                                                      Fit for surgery     --Patient is to take all scheduled medications on the day of surgery EXCEPT for modifications listed below.    APPROVAL GIVEN to proceed with proposed procedure, without further diagnostic evaluation       Signed Electronically by: Florentino Chavez MD    Copy of this evaluation report is provided to requesting physician.    [facility] Preop Guidelines

## 2018-04-19 ENCOUNTER — MYC MEDICAL ADVICE (OUTPATIENT)
Dept: FAMILY MEDICINE | Facility: CLINIC | Age: 63
End: 2018-04-19

## 2018-04-19 NOTE — TELEPHONE ENCOUNTER
Old customer service message, see visits after, no action needed  Alix Weaver RN, BSN  Message handled by Nurse Triage.

## 2018-05-17 ENCOUNTER — TRANSFERRED RECORDS (OUTPATIENT)
Dept: HEALTH INFORMATION MANAGEMENT | Facility: CLINIC | Age: 63
End: 2018-05-17

## 2018-09-04 ENCOUNTER — MYC MEDICAL ADVICE (OUTPATIENT)
Dept: FAMILY MEDICINE | Facility: CLINIC | Age: 63
End: 2018-09-04

## 2018-09-04 ENCOUNTER — OFFICE VISIT (OUTPATIENT)
Dept: FAMILY MEDICINE | Facility: CLINIC | Age: 63
End: 2018-09-04
Payer: COMMERCIAL

## 2018-09-04 VITALS
HEART RATE: 78 BPM | SYSTOLIC BLOOD PRESSURE: 138 MMHG | BODY MASS INDEX: 28 KG/M2 | WEIGHT: 211.3 LBS | DIASTOLIC BLOOD PRESSURE: 88 MMHG | OXYGEN SATURATION: 98 % | HEIGHT: 73 IN | RESPIRATION RATE: 14 BRPM | TEMPERATURE: 97.9 F

## 2018-09-04 DIAGNOSIS — Z12.5 SCREENING FOR PROSTATE CANCER: ICD-10-CM

## 2018-09-04 DIAGNOSIS — Z11.59 ENCOUNTER FOR HCV SCREENING TEST FOR LOW RISK PATIENT: ICD-10-CM

## 2018-09-04 DIAGNOSIS — Z11.4 SCREENING FOR HIV (HUMAN IMMUNODEFICIENCY VIRUS): ICD-10-CM

## 2018-09-04 DIAGNOSIS — M54.40 LUMBAGO OF LUMBAR REGION WITH SCIATICA: ICD-10-CM

## 2018-09-04 DIAGNOSIS — F51.01 PRIMARY INSOMNIA: ICD-10-CM

## 2018-09-04 DIAGNOSIS — Z00.00 ROUTINE GENERAL MEDICAL EXAMINATION AT A HEALTH CARE FACILITY: Primary | ICD-10-CM

## 2018-09-04 DIAGNOSIS — N52.1 ERECTILE DYSFUNCTION DUE TO DISEASES CLASSIFIED ELSEWHERE: ICD-10-CM

## 2018-09-04 DIAGNOSIS — I47.10 PAROXYSMAL SUPRAVENTRICULAR TACHYCARDIA (H): ICD-10-CM

## 2018-09-04 DIAGNOSIS — J45.41: ICD-10-CM

## 2018-09-04 DIAGNOSIS — M54.2 NECK PAIN: ICD-10-CM

## 2018-09-04 DIAGNOSIS — K21.00 GASTROESOPHAGEAL REFLUX DISEASE WITH ESOPHAGITIS: ICD-10-CM

## 2018-09-04 DIAGNOSIS — Z12.11 SPECIAL SCREENING FOR MALIGNANT NEOPLASMS, COLON: ICD-10-CM

## 2018-09-04 PROCEDURE — 36415 COLL VENOUS BLD VENIPUNCTURE: CPT | Performed by: FAMILY MEDICINE

## 2018-09-04 PROCEDURE — 99213 OFFICE O/P EST LOW 20 MIN: CPT | Mod: 25 | Performed by: FAMILY MEDICINE

## 2018-09-04 PROCEDURE — 80307 DRUG TEST PRSMV CHEM ANLYZR: CPT | Mod: 90 | Performed by: FAMILY MEDICINE

## 2018-09-04 PROCEDURE — 87389 HIV-1 AG W/HIV-1&-2 AB AG IA: CPT | Performed by: FAMILY MEDICINE

## 2018-09-04 PROCEDURE — 99396 PREV VISIT EST AGE 40-64: CPT | Performed by: FAMILY MEDICINE

## 2018-09-04 PROCEDURE — 80061 LIPID PANEL: CPT | Performed by: FAMILY MEDICINE

## 2018-09-04 PROCEDURE — 86803 HEPATITIS C AB TEST: CPT | Performed by: FAMILY MEDICINE

## 2018-09-04 PROCEDURE — G0103 PSA SCREENING: HCPCS | Performed by: FAMILY MEDICINE

## 2018-09-04 PROCEDURE — 99000 SPECIMEN HANDLING OFFICE-LAB: CPT | Performed by: FAMILY MEDICINE

## 2018-09-04 PROCEDURE — 80053 COMPREHEN METABOLIC PANEL: CPT | Performed by: FAMILY MEDICINE

## 2018-09-04 PROCEDURE — 82043 UR ALBUMIN QUANTITATIVE: CPT | Performed by: FAMILY MEDICINE

## 2018-09-04 RX ORDER — HYDROCODONE BITARTRATE AND ACETAMINOPHEN 5; 325 MG/1; MG/1
1 TABLET ORAL EVERY 6 HOURS PRN
Qty: 45 TABLET | Refills: 0 | Status: SHIPPED | OUTPATIENT
Start: 2018-09-04 | End: 2019-03-27

## 2018-09-04 RX ORDER — ZOLPIDEM TARTRATE 10 MG/1
10 TABLET ORAL
Qty: 30 TABLET | Refills: 3 | Status: SHIPPED | OUTPATIENT
Start: 2018-09-04 | End: 2019-09-11

## 2018-09-04 RX ORDER — ALBUTEROL SULFATE 90 UG/1
1-2 AEROSOL, METERED RESPIRATORY (INHALATION) EVERY 4 HOURS PRN
Qty: 1 INHALER | Refills: 11 | Status: SHIPPED | OUTPATIENT
Start: 2018-09-04 | End: 2019-09-11

## 2018-09-04 RX ORDER — SILDENAFIL CITRATE 20 MG/1
TABLET ORAL
Qty: 30 TABLET | Refills: 0 | Status: SHIPPED | OUTPATIENT
Start: 2018-09-04 | End: 2018-09-05

## 2018-09-04 RX ORDER — FLUTICASONE PROPIONATE 50 MCG
SPRAY, SUSPENSION (ML) NASAL
Qty: 16 ML | Refills: 3 | Status: SHIPPED | OUTPATIENT
Start: 2018-09-04 | End: 2019-09-11

## 2018-09-04 RX ORDER — CYCLOBENZAPRINE HCL 10 MG
10 TABLET ORAL 3 TIMES DAILY PRN
Qty: 90 TABLET | Refills: 1 | Status: SHIPPED | OUTPATIENT
Start: 2018-09-04 | End: 2019-09-11 | Stop reason: DRUGHIGH

## 2018-09-04 ASSESSMENT — ANXIETY QUESTIONNAIRES
6. BECOMING EASILY ANNOYED OR IRRITABLE: NOT AT ALL
5. BEING SO RESTLESS THAT IT IS HARD TO SIT STILL: NOT AT ALL
GAD7 TOTAL SCORE: 0
3. WORRYING TOO MUCH ABOUT DIFFERENT THINGS: NOT AT ALL
3. WORRYING TOO MUCH ABOUT DIFFERENT THINGS: NOT AT ALL
GAD7 TOTAL SCORE: 0
5. BEING SO RESTLESS THAT IT IS HARD TO SIT STILL: NOT AT ALL
1. FEELING NERVOUS, ANXIOUS, OR ON EDGE: NOT AT ALL
GAD7 TOTAL SCORE: 0
7. FEELING AFRAID AS IF SOMETHING AWFUL MIGHT HAPPEN: NOT AT ALL
2. NOT BEING ABLE TO STOP OR CONTROL WORRYING: NOT AT ALL
GAD7 TOTAL SCORE: 0
4. TROUBLE RELAXING: NOT AT ALL
7. FEELING AFRAID AS IF SOMETHING AWFUL MIGHT HAPPEN: NOT AT ALL
4. TROUBLE RELAXING: NOT AT ALL
2. NOT BEING ABLE TO STOP OR CONTROL WORRYING: NOT AT ALL
6. BECOMING EASILY ANNOYED OR IRRITABLE: NOT AT ALL
7. FEELING AFRAID AS IF SOMETHING AWFUL MIGHT HAPPEN: NOT AT ALL
1. FEELING NERVOUS, ANXIOUS, OR ON EDGE: NOT AT ALL

## 2018-09-04 ASSESSMENT — PATIENT HEALTH QUESTIONNAIRE - PHQ9
SUM OF ALL RESPONSES TO PHQ QUESTIONS 1-9: 1
SUM OF ALL RESPONSES TO PHQ QUESTIONS 1-9: 1
10. IF YOU CHECKED OFF ANY PROBLEMS, HOW DIFFICULT HAVE THESE PROBLEMS MADE IT FOR YOU TO DO YOUR WORK, TAKE CARE OF THINGS AT HOME, OR GET ALONG WITH OTHER PEOPLE: NOT DIFFICULT AT ALL
SUM OF ALL RESPONSES TO PHQ QUESTIONS 1-9: 1

## 2018-09-04 NOTE — PROGRESS NOTES
Answers for HPI/ROS submitted by the patient on 9/4/2018   If you checked off any problems, how difficult have these problems made it for you to do your work, take care of things at home, or get along with other people?: Not difficult at all  PHQ9 TOTAL SCORE: 1  JUDITH 7 TOTAL SCORE: 0      SUBJECTIVE:   CC: Christopher Buchanan is an 62 year old male who presents for preventative health visit.     Healthy Habits:    Do you get at least three servings of calcium containing foods daily (dairy, green leafy vegetables, etc.)? yes    Amount of exercise or daily activities, outside of work: walking     Problems taking medications regularly No    Medication side effects: No    Have you had an eye exam in the past two years? yes    Do you see a dentist twice per year? yes    Do you have sleep apnea, excessive snoring or daytime drowsiness?no           Today's PHQ-2 Score:   PHQ-2 ( 1999 [employer]) 1/8/2018 11/29/2016   Q1: Little interest or pleasure in doing things 0 -   Q2: Feeling down, depressed or hopeless 0 -   PHQ-2 Score 0 -   Q1: Little interest or pleasure in doing things - Not at all   Q2: Feeling down, depressed or hopeless - Not at all   PHQ-2 Score - 0       Abuse: Current or Past(Physical, Sexual or Emotional)- No  Do you feel safe in your environment - Yes    Social History   Substance Use Topics     Smoking status: Never Smoker     Smokeless tobacco: Never Used     Alcohol use Yes      Comment: 1 beer daily      If you drink alcohol do you typically have >3 drinks per day or >7 drinks per week?                       Last PSA:   PSA   Date Value Ref Range Status   12/07/2016 1.81 0 - 4 ug/L Final     Comment:     Assay Method:  Chemiluminescence using Siemens Vista analyzer       Reviewed orders with patient. Reviewed health maintenance and updated orders accordingly -   BP Readings from Last 3 Encounters:   09/04/18 138/88   01/08/18 118/72   12/05/17 138/78    Wt Readings from Last 3 Encounters:   09/04/18 211 lb 4.8  "oz (95.8 kg)   01/08/18 213 lb 8 oz (96.8 kg)   12/05/17 211 lb 1.6 oz (95.8 kg)                    Reviewed and updated as needed this visit by clinical staff  Tobacco  Allergies  Meds  Med Hx  Surg Hx  Fam Hx  Soc Hx        Reviewed and updated as needed this visit by Provider            ROS:  CONSTITUTIONAL: NEGATIVE for fever, chills, change in weight  INTEGUMENTARY/SKIN: NEGATIVE for worrisome rashes, moles or lesions  EYES: NEGATIVE for vision changes or irritation  ENT: NEGATIVE for ear, mouth and throat problems  RESP: NEGATIVE for significant cough or SOB  CV: NEGATIVE for chest pain, palpitations or peripheral edema  GI: NEGATIVE for nausea, abdominal pain, heartburn, or change in bowel habits   male: negative for dysuria, hematuria, decreased urinary stream, erectile dysfunction, urethral discharge  MUSCULOSKELETAL:had cervical surgery with right arm recovery   NEURO: NEGATIVE for weakness, dizziness or paresthesias  PSYCHIATRIC: NEGATIVE for changes in mood or affect    OBJECTIVE:   /88 (BP Location: Left arm, Patient Position: Chair, Cuff Size: Adult Large)  Pulse 78  Temp 97.9  F (36.6  C) (Oral)  Resp 14  Ht 6' 1\" (1.854 m)  Wt 211 lb 4.8 oz (95.8 kg)  SpO2 98%  BMI 27.88 kg/m2  EXAM:  GENERAL: healthy, alert and no distress  EYES: Eyes grossly normal to inspection, PERRL and conjunctivae and sclerae normal  HENT: ear canals and TM's normal, nose and mouth without ulcers or lesions  NECK: no adenopathy, no asymmetry, masses, or scars and thyroid normal to palpation  RESP: lungs clear to auscultation - no rales, rhonchi or wheezes  CV: regular rate and rhythm, normal S1 S2, no S3 or S4, no murmur, click or rub, no peripheral edema and peripheral pulses strong  ABDOMEN: soft, nontender, no hepatosplenomegaly, no masses and bowel sounds normal   (male): normal male genitalia without lesions or urethral discharge, no hernia  MS: no gross musculoskeletal defects noted, no " "edema  SKIN: no suspicious lesions or rashes  NEURO: Normal strength and tone, mentation intact and speech normal  PSYCH: mentation appears normal, affect normal/bright        ASSESSMENT/PLAN:   (J45.40) Moderate persistent asthma  (primary encounter diagnosis)  Comment:   Plan: Asthma Action Plan (AAP)        He'll consider trying a controller again, had one svt episode and worried about staying off,     (Z00.00) Routine general medical examination at a health care facility  Comment:   Plan: had neck surgery, well tolerated     (I47.1) Paroxysmal supraventricular tachycardia (H)  Comment:   Plan: single episode, unclear if med related     (K21.0) Gastroesophageal reflux disease with esophagitis  Comment:   Plan: recurrent , diet provokes, Body mass index is 27.88 kg/(m^2).      (M54.40) Lumbago of lumbar region with sciatica  Comment:   Plan: limites his exercise , improved from previous work injury         COUNSELING:  Reviewed preventive health counseling, as reflected in patient instructions       Regular exercise       Healthy diet/nutrition       Vision screening    BP Readings from Last 1 Encounters:   09/04/18 138/88     Estimated body mass index is 27.88 kg/(m^2) as calculated from the following:    Height as of this encounter: 6' 1\" (1.854 m).    Weight as of this encounter: 211 lb 4.8 oz (95.8 kg).      Weight management plan: Discussed healthy diet and exercise guidelines and patient will follow up in 3 months in clinic to re-evaluate.     reports that he has never smoked. He has never used smokeless tobacco.      Counseling Resources:  ATP IV Guidelines  Pooled Cohorts Equation Calculator  FRAX Risk Assessment  ICSI Preventive Guidelines  Dietary Guidelines for Americans, 2010  USDA's MyPlate  ASA Prophylaxis  Lung CA Screening    Florentino Chavez MD  [facility]"

## 2018-09-04 NOTE — MR AVS SNAPSHOT
After Visit Summary   9/4/2018    Christopher Buchanan    MRN: [id number]           Patient Information     Date Of Birth          1955        Visit Information        Provider Department      9/4/2018 1:00 PM Florentino Chavez MD [facility]        Today's Diagnoses     Moderate persistent asthma    -  1    Routine general medical examination at a health care facility        Paroxysmal supraventricular tachycardia (H)        Gastroesophageal reflux disease with esophagitis        Lumbago of lumbar region with sciatica        Screening for prostate cancer        Special screening for malignant neoplasms, colon        Primary insomnia        Erectile dysfunction due to diseases classified elsewhere        Neck pain        Mild persistent asthma with exacerbation        Moderate persistent chronic asthma with acute exacerbation        Encounter for HCV screening test for low risk patient        Screening for HIV (human immunodeficiency virus)          Care Instructions      Preventive Health Recommendations  Male Ages 50 - 64    Yearly exam:             See your health care provider every year in order to  o   Review health changes.   o   Discuss preventive care.    o   Review your medicines if your doctor has prescribed any.     Have a cholesterol test every 5 years, or more frequently if you are at risk for high cholesterol/heart disease.     Have a diabetes test (fasting glucose) every three years. If you are at risk for diabetes, you should have this test more often.     Have a colonoscopy at age 50, or have a yearly FIT test (stool test). These exams will check for colon cancer.      Talk with your health care provider about whether or not a prostate cancer screening test (PSA) is right for you.    You should be tested each year for STDs (sexually transmitted diseases), if you re at risk.     Shots: Get a flu shot each year. Get a tetanus shot every 10 years.      Nutrition:    Eat at least 5 servings of fruits and vegetables daily.     Eat whole-grain bread, whole-wheat pasta and brown rice instead of white grains and rice.     Get adequate Calcium and Vitamin D.     Lifestyle    Exercise for at least 150 minutes a week (30 minutes a day, 5 days a week). This will help you control your weight and prevent disease.     Limit alcohol to one drink per day.     No smoking.     Wear sunscreen to prevent skin cancer.     See your dentist every six months for an exam and cleaning.     See your eye doctor every 1 to 2 years.            Follow-ups after your visit        Future tests that were ordered for you today     Open Future Orders        Priority Expected Expires Ordered    Fecal colorectal cancer screen (FIT) Routine 9/25/2018 11/27/2018 9/4/2018            Who to contact     If you have questions or need follow up information about today's clinic visit or your schedule please contact [facility] directly at [phone].  Normal or non-critical lab and imaging results will be communicated to you by [employer], letter or phone within 4 business days after the clinic has received the results. If you do not hear from us within 7 days, please contact the clinic through [employer] or phone. If you have a critical or abnormal lab result, we will notify you by phone as soon as possible.  Submit refill requests through [employer] or call your pharmacy and they will forward the refill request to us. Please allow 3 business days for your refill to be completed.          Additional Information About Your Visit        [employer] Information     [employer] gives you secure access to your electronic health record. If you see a primary care provider, you can also send messages to your care team and make appointments. If you have questions, please call your primary care clinic.  If you do not have a primary care provider, please call [phone] and they will assist you.        Care  "EveryWhere ID     This is your Care EveryWhere ID. This could be used by other organizations to access your [facility] medical records  [id number]        Your Vitals Were     Pulse Temperature Respirations Height Pulse Oximetry BMI (Body Mass Index)    78 97.9  F (36.6  C) (Oral) 14 6' 1\" (1.854 m) 98% 27.88 kg/m2       Blood Pressure from Last 3 Encounters:   09/04/18 138/88   01/08/18 118/72   12/05/17 138/78    Weight from Last 3 Encounters:   09/04/18 211 lb 4.8 oz (95.8 kg)   01/08/18 213 lb 8 oz (96.8 kg)   12/05/17 211 lb 1.6 oz (95.8 kg)              We Performed the Following     Albumin Random Urine Quantitative with Creat Ratio     Asthma Action Plan (AAP)     Comprehensive metabolic panel     Drug  Screen Comprehensive, Urine w/o Reported Meds (Pain Care Package)     Hepatitis C Screen Reflex to HCV RNA Quant and Genotype     HIV Antigen Antibody Combo     Lipid panel reflex to direct LDL Fasting     PSA, screen          Today's Medication Changes          These changes are accurate as of 9/4/18  1:37 PM.  If you have any questions, ask your nurse or doctor.               Start taking these medicines.        Dose/Directions    omeprazole 20 MG CR capsule   Commonly known as:  priLOSEC   Used for:  Gastroesophageal reflux disease with esophagitis   Started by:  Florentino Chavez MD        Dose:  20 mg   Take 1 capsule (20 mg) by mouth daily   Quantity:  30 capsule   Refills:  11         Stop taking these medicines if you haven't already. Please contact your care team if you have questions.     budesonide 180 MCG/ACT inhaler   Commonly known as:  PULMICORT FLEXHALER   Stopped by:  Florentino Chavez MD                Where to get your medicines      These medications were sent to [facility] [id number] [place] [address] AT [address]  [address] [place] [id number]     Phone:  [phone]     albuterol 108 (90 Base) MCG/ACT inhaler    " cyclobenzaprine 10 MG tablet    fluticasone 50 MCG/ACT spray    omeprazole 20 MG CR capsule    sildenafil 20 MG tablet         Some of these will need a paper prescription and others can be bought over the counter.  Ask your nurse if you have questions.     Bring a paper prescription for each of these medications     HYDROcodone-acetaminophen 5-325 MG per tablet    zolpidem 10 MG tablet               Information about OPIOIDS     PRESCRIPTION OPIOIDS: WHAT YOU NEED TO KNOW   We gave you an opioid (narcotic) pain medicine. It is important to manage your pain, but opioids are not always the best choice. You should first try all the other options your care team gave you. Take this medicine for as short a time (and as few doses) as possible.    Some activities can increase your pain, such as bandage changes or therapy sessions. It may help to take your pain medicine 30 to 60 minutes before these activities. Reduce your stress by getting enough sleep, working on hobbies you enjoy and practicing relaxation or meditation. Talk to your care team about ways to manage your pain beyond prescription opioids.    These medicines have risks:    DO NOT drive when on new or higher doses of pain medicine. These medicines can affect your alertness and reaction times, and you could be arrested for driving under the influence (DUI). If you need to use opioids long-term, talk to your care team about driving.    DO NOT operate heavy machinery    DO NOT do any other dangerous activities while taking these medicines.    DO NOT drink any alcohol while taking these medicines.     If the opioid prescribed includes acetaminophen, DO NOT take with any other medicines that contain acetaminophen. Read all labels carefully. Look for the word  acetaminophen  or  Tylenol.  Ask your pharmacist if you have questions or are unsure.    You can get addicted to pain medicines, especially if you have a history of addiction (chemical, alcohol or substance  dependence). Talk to your care team about ways to reduce this risk.    All opioids tend to cause constipation. Drink plenty of water and eat foods that have a lot of fiber, such as fruits, vegetables, prune juice, apple juice and high-fiber cereal. Take a laxative (Miralax, milk of magnesia, Colace, Senna) if you don t move your bowels at least every other day. Other side effects include upset stomach, sleepiness, dizziness, throwing up, tolerance (needing more of the medicine to have the same effect), physical dependence and slowed breathing.    Store your pills in a secure place, locked if possible. We will not replace any lost or stolen medicine. If you don t finish your medicine, please throw away (dispose) as directed by your pharmacist. The [place] Pollution Control Agency has more information about safe disposal: [email or web address].[email or web address]/living-[email or web address]/[email or web address]-unwanted-medications         Primary Care Provider Office Phone # Fax #    Florentino Chavez [phone] [address] [facility] [address]        Equal Access to Services     [facility] HospitalDEBBIE : Hadii dedra vasquez hadasho Sokalie, waaxda luqadaha, qaybta kaalmada kanwal, henrietta arellano . So [facility] [phone].    ATENCIÓN: Si habla español, tiene a sanabria disposición servicios gratuitos de asistencia lingüística. Jorgito al [phone].    We comply with applicable federal civil rights laws and [place] laws. We do not discriminate on the basis of race, color, national origin, age, disability, sex, sexual orientation, or gender identity.            Thank you!     Thank you for choosing [facility]  for your care. Our goal is always to provide you with excellent care. Hearing back from our patients is one way we can continue to improve our services. Please take a few minutes to complete the written survey that you may receive in the mail after your visit with us. Thank you!              Your Updated Medication List - Protect others around you: Learn how to safely use, store and throw away your medicines at www.disposemymeds.org.          This list is accurate as of 9/4/18  1:37 PM.  Always use your most recent med list.                   Brand Name Dispense Instructions for use Diagnosis    albuterol 108 (90 Base) MCG/ACT inhaler    PROAIR HFA    1 Inhaler    Inhale 1-2 puffs into the lungs every 4 hours as needed for shortness of breath / dyspnea    Moderate persistent chronic asthma with acute exacerbation       cyclobenzaprine 10 MG tablet    FLEXERIL    90 tablet    Take 1 tablet (10 mg) by mouth 3 times daily as needed for muscle spasms    Neck pain       fluticasone 50 MCG/ACT spray    FLONASE    16 mL    SHAKE WELL AND USE 2 SPRAYS IN EACH NOSTRIL DAILY    Mild persistent asthma with exacerbation       HYDROcodone-acetaminophen 5-325 MG per tablet    NORCO    45 tablet    Take 1 tablet by mouth every 6 hours as needed for moderate to severe pain    Lumbago of lumbar region with sciatica       omeprazole 20 MG CR capsule    priLOSEC    30 capsule    Take 1 capsule (20 mg) by mouth daily    Gastroesophageal reflux disease with esophagitis       * sildenafil 20 MG tablet    REVATIO    60 tablet    Take one to 3 tablets one hour before sex    Erectile dysfunction due to diseases classified elsewhere       * sildenafil 20 MG tablet    REVATIO    30 tablet    Take one or two one hour before intercourse    Erectile dysfunction due to diseases classified elsewhere       zolpidem 10 MG tablet    AMBIEN    30 tablet    Take 1 tablet (10 mg) by mouth nightly as needed SEE MD IN JUNE    Primary insomnia       * Notice:  This list has 2 medication(s) that are the same as other medications prescribed for you. Read the directions carefully, and ask your doctor or other care provider to review them with you.

## 2018-09-04 NOTE — LETTER
My Asthma Action Plan  Name: Christopher Buchanan   YOB: 1955  Date: 9/4/2018   My doctor: Florentino Chavez MD   My clinic: [facility]        My Control Medicine: Albuterol  My Rescue Medicine: Albuterol   My Asthma Severity: mild persistent  Avoid your asthma triggers: smoke and pollens               GREEN ZONE   Good Control    I feel good    No cough or wheeze    Can work, sleep and play without asthma symptoms       Take your asthma control medicine every day.     1. If exercise triggers your asthma, take your rescue medication    15 minutes before exercise or sports, and    During exercise if you have asthma symptoms  2. Spacer to use with inhaler: If you have a spacer, make sure to use it with your inhaler             YELLOW ZONE Getting Worse  I have ANY of these:    I do not feel good    Cough or wheeze    Chest feels tight    Wake up at night   1. Keep taking your Green Zone medications  2. Start taking your rescue medicine:    every 20 minutes for up to 1 hour. Then every 4 hours for 24-48 hours.  3. If you stay in the Yellow Zone for more than 12-24 hours, contact your doctor.  4. If you do not return to the Green Zone in 12-24 hours or you get worse, start taking your oral steroid medicine if prescribed by your provider.           RED ZONE Medical Alert - Get Help  I have ANY of these:    I feel awful    Medicine is not helping    Breathing getting harder    Trouble walking or talking    Nose opens wide to breathe       1. Take your rescue medicine NOW  2. If your provider has prescribed an oral steroid medicine, start taking it NOW  3. Call your doctor NOW  4. If you are still in the Red Zone after 20 minutes and you have not reached your doctor:    Take your rescue medicine again and    Call 911 or go to the emergency room right away    See your regular doctor within 2 weeks of an Emergency Room or Urgent Care visit for follow-up treatment.          Annual Reminders:   Meet with Asthma Educator,  Flu Shot in the Fall, consider Pneumonia Vaccination for patients with asthma (aged 19 and older).    Pharmacy:    [facility] [address] [place], [place] - [address] AT [facility] [address]  [facility] [address] [place], [place] - [address] [facility] [address]  [facility] [address] [place], [place] - [address] [facility] AT [address]  [facility] [place] [place] - [address] [facility]                      Asthma Triggers  How To Control Things That Make Your Asthma Worse    Triggers are things that make your asthma worse.  Look at the list below to help you find your triggers and what you can do about them.  You can help prevent asthma flare-ups by staying away from your triggers.      Trigger                                                          What you can do   Cigarette Smoke  Tobacco smoke can make asthma worse. Do not allow smoking in your home, car or around you.  Be sure no one smokes at a child s day care or school.  If you smoke, ask your health care provider for ways to help you quit.  Ask family members to quit too.  Ask your health care provider for a referral to Quit Plan to help you quit smoking, or call 3-834-892-PLAN.     Colds, Flu, Bronchitis  These are common triggers of asthma. Wash your hands often.  Don t touch your eyes, nose or mouth.  Get a flu shot every year.     Dust Mites  These are tiny bugs that live in cloth or carpet. They are too small to see. Wash sheets and blankets in hot water every week.   Encase pillows and mattress in dust mite proof covers.  Avoid having carpet if you can. If you have carpet, vacuum weekly.   Use a dust mask and HEPA vacuum.   Pollen and Outdoor Mold  Some people are allergic to trees, grass, or weed pollen, or molds. Try to keep your windows closed.  Limit time out doors when pollen count is high.   Ask you health care provider about taking medicine during  allergy season.     Animal Dander  Some people are allergic to skin flakes, urine or saliva from pets with fur or feathers. Keep pets with fur or feathers out of your home.    If you can t keep the pet outdoors, then keep the pet out of your bedroom.  Keep the bedroom door closed.  Keep pets off cloth furniture and away from stuffed toys.     Mice, Rats, and Cockroaches  Some people are allergic to the waste from these pests.   Cover food and garbage.  Clean up spills and food crumbs.  Store grease in the refrigerator.   Keep food out of the bedroom.   Indoor Mold  This can be a trigger if your home has high moisture. Fix leaking faucets, pipes, or other sources of water.   Clean moldy surfaces.  Dehumidify basement if it is damp and smelly.   Smoke, Strong Odors, and Sprays  These can reduce air quality. Stay away from strong odors and sprays, such as perfume, powder, hair spray, paints, smoke incense, paint, cleaning products, candles and new carpet.   Exercise or Sports  Some people with asthma have this trigger. Be active!  Ask your doctor about taking medicine before sports or exercise to prevent symptoms.    Warm up for 5-10 minutes before and after sports or exercise.     Other Triggers of Asthma  Cold air:  Cover your nose and mouth with a scarf.  Sometimes laughing or crying can be a trigger.  Some medicines and food can trigger asthma.

## 2018-09-05 DIAGNOSIS — N52.1 ERECTILE DYSFUNCTION DUE TO DISEASES CLASSIFIED ELSEWHERE: ICD-10-CM

## 2018-09-05 LAB
ALBUMIN SERPL-MCNC: 4.1 G/DL (ref 3.4–5)
ALP SERPL-CCNC: 56 U/L (ref 40–150)
ALT SERPL W P-5'-P-CCNC: 30 U/L (ref 0–70)
ANION GAP SERPL CALCULATED.3IONS-SCNC: 7 MMOL/L (ref 3–14)
AST SERPL W P-5'-P-CCNC: 16 U/L (ref 0–45)
BILIRUB SERPL-MCNC: 0.5 MG/DL (ref 0.2–1.3)
BUN SERPL-MCNC: 14 MG/DL (ref 7–30)
CALCIUM SERPL-MCNC: 8.8 MG/DL (ref 8.5–10.1)
CHLORIDE SERPL-SCNC: 103 MMOL/L (ref 94–109)
CHOLEST SERPL-MCNC: 192 MG/DL
CO2 SERPL-SCNC: 28 MMOL/L (ref 20–32)
CREAT SERPL-MCNC: 0.89 MG/DL (ref 0.66–1.25)
CREAT UR-MCNC: 50 MG/DL
GFR SERPL CREATININE-BSD FRML MDRD: 86 ML/MIN/1.7M2
GLUCOSE SERPL-MCNC: 93 MG/DL (ref 70–99)
HCV AB SERPL QL IA: NONREACTIVE
HDLC SERPL-MCNC: 48 MG/DL
HIV 1+2 AB+HIV1 P24 AG SERPL QL IA: NONREACTIVE
LDLC SERPL CALC-MCNC: 116 MG/DL
MICROALBUMIN UR-MCNC: <5 MG/L
MICROALBUMIN/CREAT UR: NORMAL MG/G CR (ref 0–17)
NONHDLC SERPL-MCNC: 144 MG/DL
POTASSIUM SERPL-SCNC: 4 MMOL/L (ref 3.4–5.3)
PROT SERPL-MCNC: 6.8 G/DL (ref 6.8–8.8)
PSA SERPL-ACNC: 2.41 UG/L (ref 0–4)
SODIUM SERPL-SCNC: 138 MMOL/L (ref 133–144)
TRIGL SERPL-MCNC: 140 MG/DL

## 2018-09-05 RX ORDER — SILDENAFIL CITRATE 20 MG/1
TABLET ORAL
Qty: 30 TABLET | Refills: 0 | Status: SHIPPED | OUTPATIENT
Start: 2018-09-05 | End: 2018-09-05

## 2018-09-05 NOTE — TELEPHONE ENCOUNTER
"Requested Prescriptions   Pending Prescriptions Disp Refills     sildenafil (REVATIO) 20 MG tablet [Pharmacy Med Name: SILDENAFIL 20MG TAB- TABS]  0    Last Written Prescription Date:  9/5/18  Last Fill Quantity: 30,  # refills: 0  Last Office Visit: 9/4/2018   Future Office Visit:      Sig: TAKE 1 TO 3 TABLETS BY MOUTH 1 HOUR BEFORE INTERCOURSE AS NEEDED    Erectile Dysfuction Protocol Passed    9/5/2018  9:08 AM       Passed - Absence of nitrates on medication list       Passed - Absence of Alpha Blockers on Med list       Passed - Recent (12 mo) or future (30 days) visit within the authorizing provider's specialty    Patient had office visit in the last 12 months or has a visit in the next 30 days with authorizing provider or within the authorizing provider's specialty.  See \"Patient Info\" tab in inbasket, or \"Choose Columns\" in Meds & Orders section of the refill encounter.           Passed - Patient is age 18 or older          "

## 2018-09-05 NOTE — TELEPHONE ENCOUNTER
See damasohart request, wants sildenafil transferred to different pharmacy    Last office visit: 9/4/2018 with prescribing provider:     Future Office Visit:    Prescription approved per [facility] Refill Protocol.  Alix Weaver RN, BSN

## 2018-09-06 LAB — COMPREHEN DRUG ANALYSIS UR: NORMAL

## 2018-09-06 ASSESSMENT — ASTHMA QUESTIONNAIRES: ACT_TOTALSCORE: 17

## 2018-09-06 ASSESSMENT — PATIENT HEALTH QUESTIONNAIRE - PHQ9: SUM OF ALL RESPONSES TO PHQ QUESTIONS 1-9: 1

## 2018-09-06 ASSESSMENT — ANXIETY QUESTIONNAIRES: GAD7 TOTAL SCORE: 0

## 2018-09-06 NOTE — TELEPHONE ENCOUNTER
Routing refill request to provider to review approval because:  SEE RX JESSICA RIVERA MD SENT FOR 30 TABLETS, PT DECLINES, WANTS 60 TABLETS, ROUTED, IF OKAY APPROVE AND CLOSE, IF DENIED ROUTE TO INFORM PHARMACY  Alix Weaver RN, BSN  Message handled by Nurse Triage.

## 2018-09-07 RX ORDER — SILDENAFIL CITRATE 20 MG/1
TABLET ORAL
Qty: 60 TABLET | Refills: 0 | Status: SHIPPED | OUTPATIENT
Start: 2018-09-07 | End: 2019-03-27

## 2018-09-26 ENCOUNTER — TRANSFERRED RECORDS (OUTPATIENT)
Dept: HEALTH INFORMATION MANAGEMENT | Facility: CLINIC | Age: 63
End: 2018-09-26

## 2018-09-27 PROCEDURE — 82274 ASSAY TEST FOR BLOOD FECAL: CPT | Performed by: FAMILY MEDICINE

## 2018-09-30 LAB — HEMOCCULT STL QL IA: NEGATIVE

## 2018-10-01 DIAGNOSIS — Z12.11 SPECIAL SCREENING FOR MALIGNANT NEOPLASMS, COLON: ICD-10-CM

## 2018-10-19 ENCOUNTER — ALLIED HEALTH/NURSE VISIT (OUTPATIENT)
Dept: NURSING | Facility: CLINIC | Age: 63
End: 2018-10-19
Payer: COMMERCIAL

## 2018-10-19 DIAGNOSIS — Z23 NEED FOR PROPHYLACTIC VACCINATION AND INOCULATION AGAINST INFLUENZA: Primary | ICD-10-CM

## 2018-10-19 PROCEDURE — 99207 ZZC NO CHARGE NURSE ONLY: CPT

## 2018-10-19 PROCEDURE — 90471 IMMUNIZATION ADMIN: CPT

## 2018-10-19 PROCEDURE — 90662 IIV NO PRSV INCREASED AG IM: CPT

## 2018-10-19 NOTE — PROGRESS NOTES

## 2018-10-19 NOTE — MR AVS SNAPSHOT
After Visit Summary   10/19/2018    Christopher Buchanan    MRN: [id number]           Patient Information     Date Of Birth          1955        Visit Information        Provider Department      10/19/2018 1:15 PM CR FLU CLINIC [facility]        Today's Diagnoses     Need for prophylactic vaccination and inoculation against influenza    -  1       Follow-ups after your visit        Your next 10 appointments already scheduled     Oct 22, 2018  8:15 AM CDT   Return Visit with Jerel Prado MD   [facility] [place] ([facility])    [address]  [address]  [facility] [address] [phone]              Who to contact     If you have questions or need follow up information about today's clinic visit or your schedule please contact [facility] directly at [phone].  Normal or non-critical lab and imaging results will be communicated to you by Cmxtwentyhart, letter or phone within 4 business days after the clinic has received the results. If you do not hear from us within 7 days, please contact the clinic through Cmxtwentyhart or phone. If you have a critical or abnormal lab result, we will notify you by phone as soon as possible.  Submit refill requests through Sport Telegram or call your pharmacy and they will forward the refill request to us. Please allow 3 business days for your refill to be completed.          Additional Information About Your Visit        MyChart Information     Sport Telegram gives you secure access to your electronic health record. If you see a primary care provider, you can also send messages to your care team and make appointments. If you have questions, please call your primary care clinic.  If you do not have a primary care provider, please call [phone] and they will assist you.        Care EveryWhere ID     This is your Care EveryWhere ID. This could be used by other organizations to  access your [place] medical records  [id number]         Blood Pressure from Last 3 Encounters:   09/04/18 138/88   01/08/18 118/72   12/05/17 138/78    Weight from Last 3 Encounters:   09/04/18 211 lb 4.8 oz (95.8 kg)   01/08/18 213 lb 8 oz (96.8 kg)   12/05/17 211 lb 1.6 oz (95.8 kg)              We Performed the Following     FLU VACCINE, INCREASED ANTIGEN, PRESV FREE, AGE 65+ [91876]     Vaccine Administration, Initial [94351]        Primary Care Provider Office Phone # Fax #    Florentino Silvino Chavez [phone] [address] [facility] [address]        Equal Access to Services     CAREY SHAVER : Hadii dedra leeo Sokalie, waaxda luqadaha, qaybta kaalmada adeegyada, henrietta arellano . So [facility] [phone].    ATENCIÓN: Si habla español, tiene a sanabria disposición servicios gratuitos de asistencia lingüística. Llame al [phone].    We comply with applicable federal civil rights laws and [place] laws. We do not discriminate on the basis of race, color, national origin, age, disability, sex, sexual orientation, or gender identity.            Thank you!     Thank you for choosing [facility]  for your care. Our goal is always to provide you with excellent care. Hearing back from our patients is one way we can continue to improve our services. Please take a few minutes to complete the written survey that you may receive in the mail after your visit with us. Thank you!             Your Updated Medication List - Protect others around you: Learn how to safely use, store and throw away your medicines at www.disposemymeds.org.          This list is accurate as of 10/19/18  1:22 PM.  Always use your most recent med list.                   Brand Name Dispense Instructions for use Diagnosis    albuterol 108 (90 Base) MCG/ACT inhaler    PROAIR HFA    1 Inhaler    Inhale 1-2 puffs into the lungs every 4 hours as needed for shortness of breath /  dyspnea    Moderate persistent asthma with allergic rhinitis without status asthmaticus with acute exacerbation       cyclobenzaprine 10 MG tablet    FLEXERIL    90 tablet    Take 1 tablet (10 mg) by mouth 3 times daily as needed for muscle spasms    Neck pain       fluticasone 50 MCG/ACT spray    FLONASE    16 mL    SHAKE WELL AND USE 2 SPRAYS IN EACH NOSTRIL DAILY    Moderate persistent asthma with allergic rhinitis without status asthmaticus with acute exacerbation       HYDROcodone-acetaminophen 5-325 MG per tablet    NORCO    45 tablet    Take 1 tablet by mouth every 6 hours as needed for moderate to severe pain    Lumbago of lumbar region with sciatica       omeprazole 20 MG CR capsule    priLOSEC    30 capsule    Take 1 capsule (20 mg) by mouth daily    Gastroesophageal reflux disease with esophagitis, Moderate persistent asthma with allergic rhinitis without status asthmaticus with acute exacerbation       sildenafil 20 MG tablet    REVATIO    60 tablet    TAKE 1 TO 3 TABLETS BY MOUTH 1 HOUR BEFORE INTERCOURSE AS NEEDED    Erectile dysfunction due to diseases classified elsewhere       zolpidem 10 MG tablet    AMBIEN    30 tablet    Take 1 tablet (10 mg) by mouth nightly as needed SEE MD IN JUNE    Primary insomnia

## 2018-10-22 ENCOUNTER — OFFICE VISIT (OUTPATIENT)
Dept: UROLOGY | Facility: CLINIC | Age: 63
End: 2018-10-22
Payer: COMMERCIAL

## 2018-10-22 VITALS — WEIGHT: 211 LBS | HEIGHT: 73 IN | BODY MASS INDEX: 27.96 KG/M2 | HEART RATE: 102 BPM | OXYGEN SATURATION: 96 %

## 2018-10-22 DIAGNOSIS — R36.1 HEMATOSPERMIA: Primary | ICD-10-CM

## 2018-10-22 DIAGNOSIS — N50.3 EPIDIDYMAL CYST: ICD-10-CM

## 2018-10-22 LAB
ALBUMIN UR-MCNC: NEGATIVE MG/DL
APPEARANCE UR: CLEAR
BILIRUB UR QL STRIP: NEGATIVE
COLOR UR AUTO: YELLOW
GLUCOSE UR STRIP-MCNC: NEGATIVE MG/DL
HGB UR QL STRIP: NEGATIVE
KETONES UR STRIP-MCNC: NEGATIVE MG/DL
LEUKOCYTE ESTERASE UR QL STRIP: NEGATIVE
NITRATE UR QL: NEGATIVE
PH UR STRIP: 7 PH (ref 5–7)
RESIDUAL VOLUME (RV) (EXTERNAL): 34
SOURCE: NORMAL
SP GR UR STRIP: 1.02 (ref 1–1.03)
UROBILINOGEN UR STRIP-ACNC: 0.2 EU/DL (ref 0.2–1)

## 2018-10-22 PROCEDURE — 99204 OFFICE O/P NEW MOD 45 MIN: CPT | Mod: 25 | Performed by: UROLOGY

## 2018-10-22 PROCEDURE — 81003 URINALYSIS AUTO W/O SCOPE: CPT | Mod: QW | Performed by: UROLOGY

## 2018-10-22 PROCEDURE — 51798 US URINE CAPACITY MEASURE: CPT | Performed by: UROLOGY

## 2018-10-22 ASSESSMENT — PAIN SCALES - GENERAL: PAINLEVEL: NO PAIN (0)

## 2018-10-22 NOTE — PROGRESS NOTES
[facility]  Main Office: [address]  [address]  [place] [address]       CHIEF COMPLAINT:  Left testicular and epididymal cysts, hematospermia    HISTORY:   This is a 63-year-old gentleman who I saw back in  for a left epididymal cyst. At the time the cyst is asymptomatic and he did not treat cyst. He returns today because the cyst has grown over the years and it is now becoming bothersome. This is especially bothersome when he rides his motorcycle. He is thinking about having it repaired. He also had an episode of hematospermia recently. This was one isolated episode. He reports no hematuria. He reports no pain with ejaculation. He does have mild erectile dysfunction and uses generic Viagra with good success.      PAST MEDICAL HISTORY:   Past Medical History:   Diagnosis Date     ALLERGIC RHINITIS NOS 2003     CARDIOVASCULAR SCREENING; LDL GOAL LESS THAN 160 10/31/2010     Erectile dysfunction 2011     Esophageal reflux 2003     GENERAL OSTEOARTHROSIS 2003     Hydrocele      Hydrocele, left 3/31/2011     Low back pain 2011     Moderate persistent asthma 3/21/2011     Prostatism        PAST SURGICAL HISTORY:   Past Surgical History:   Procedure Laterality Date     C SPLINT      right thumb     EYE SURGERY      YAG procedure       FAMILY HISTORY:   Family History   Problem Relation Age of Onset     C.A.D. Father       at 52 of heart failure       SOCIAL HISTORY:   Social History   Substance Use Topics     Smoking status: Never Smoker     Smokeless tobacco: Never Used     Alcohol use Yes      Comment: 1 beer daily          Allergies   Allergen Reactions     Gabapentin Palpitations and Shortness Of Breath     Tizanidine Shortness Of Breath     Latex      Other reaction(s): *Unknown  Swelling - localized to eye after eye surger     Penicillins      Noted in 08 ER, as possible         Current Outpatient Prescriptions:      fluticasone (FLONASE) 50 MCG/ACT spray, SHAKE  "WELL AND USE 2 SPRAYS IN EACH NOSTRIL DAILY, Disp: 16 mL, Rfl: 3     HYDROcodone-acetaminophen (NORCO) 5-325 MG per tablet, Take 1 tablet by mouth every 6 hours as needed for moderate to severe pain, Disp: 45 tablet, Rfl: 0     omeprazole (PRILOSEC) 20 MG CR capsule, Take 1 capsule (20 mg) by mouth daily, Disp: 30 capsule, Rfl: 11     sildenafil (REVATIO) 20 MG tablet, TAKE 1 TO 3 TABLETS BY MOUTH 1 HOUR BEFORE INTERCOURSE AS NEEDED, Disp: 60 tablet, Rfl: 0     zolpidem (AMBIEN) 10 MG tablet, Take 1 tablet (10 mg) by mouth nightly as needed SEE MD IN JUNE, Disp: 30 tablet, Rfl: 3     albuterol (PROAIR HFA) 108 (90 Base) MCG/ACT inhaler, Inhale 1-2 puffs into the lungs every 4 hours as needed for shortness of breath / dyspnea, Disp: 1 Inhaler, Rfl: 11     cyclobenzaprine (FLEXERIL) 10 MG tablet, Take 1 tablet (10 mg) by mouth 3 times daily as needed for muscle spasms, Disp: 90 tablet, Rfl: 1    Review Of Systems:  Skin: negative  Eyes: negative  Ears/Nose/Throat: negative  Respiratory: No shortness of breath, dyspnea on exertion, cough, or hemoptysis  Cardiovascular: negative  Gastrointestinal: negative  Genitourinary: negative  Musculoskeletal: negative  Neurologic: negative  Psychiatric: negative  Hematologic/Lymphatic/Immunologic: negative  Endocrine: negative      PHYSICAL EXAM:    Pulse 102  Ht 1.854 m (6' 1\")  Wt 95.7 kg (211 lb)  SpO2 96%  BMI 27.84 kg/m2  General appearance: In NAD, conversant  HEENT: Normocephalic and atraumatic, anicteric sclera  Cardiovascular: No peripheral edema  Respiratory: normal, non-labored breathing  Gastrointestinal: negative, Abdomen soft, non-tender, and non-distended.   Musculoskeletal: Normal musculature and movements  Peripheral Vascular/extremity: No peripheral edema  Skin: Normal temperature, turgor, and texture. No rash  Psychiatric: Appropriate affect, alert and oriented to person, place, and time    Penis: Normal  Scrotal skin: Normal, no lesions  Testicles: Normal " to palpation bilaterally  Epididymis: The left testicle has a very large epididymal cyst above it. This is causing a mass effect on the right testicle and pushing the right testicle upwards as well.  Lymphatic: Normal inguinal lymph nodes    Digital Rectal Exam: His prostate is slightly enlarged, benign and symmetric to palpation    Cystoscopy: Not done      PSA: He reports a normal PSA recently at [facility]    UA RESULTS:  Recent Labs   Lab Test  03/31/11   1528   COLOR  Yellow   APPEARANCE  Clear   URINEGLC  Negative   URINEBILI  Negative   URINEKETONE  Negative   SG  >1.030   UBLD  Small*   URINEPH  5.0   PROTEIN  Negative   UROBILINOGEN  0.2   NITRITE  Negative   LEUKEST  Negative   RBCU  2-5*   WBCU  O - 2       Bladder Scan: 34mL    Other Labs:      Imaging Studies: Scrotal ultrasound from 2015 shows cyst in the left testicular parenchyma and several left epididymal cysts.      CLINICAL IMPRESSION:   Left epididymal cyst, hematospermia    PLAN:   He has an epididymal cyst that has grown over the years and is now quite large. He is interested in potentially undergoing repair. We discussed epididymal cyst repair in detail today along with its risks and expected recovery. All his questions were answered. He would like to discuss this with his wife before scheduling. Also, he had an episode of hematospermia. History rectal examination is benign and he reports a normal PSA at [facility]. For this I recommended that he continue to have his PSA checked annually with his primary care provider.      Jerel Prado MD

## 2018-10-22 NOTE — MR AVS SNAPSHOT
"              After Visit Summary   10/22/2018    Christopher Buchanan    MRN: [id number]           Patient Information     Date Of Birth          1955        Visit Information        Provider Department      10/22/2018 8:15 AM Jerel Prado MD [facility] [place]        Today's Diagnoses     Hematospermia    -  1    Epididymal cyst           Follow-ups after your visit        Follow-up notes from your care team     Return if symptoms worsen or fail to improve.      Who to contact     If you have questions or need follow up information about today's clinic visit or your schedule please contact [facility] [place] directly at [phone].  Normal or non-critical lab and imaging results will be communicated to you by MyChart, letter or phone within 4 business days after the clinic has received the results. If you do not hear from us within 7 days, please contact the clinic through Naviswisshart or phone. If you have a critical or abnormal lab result, we will notify you by phone as soon as possible.  Submit refill requests through CallYourPrice or call your pharmacy and they will forward the refill request to us. Please allow 3 business days for your refill to be completed.          Additional Information About Your Visit        MyChart Information     CallYourPrice gives you secure access to your electronic health record. If you see a primary care provider, you can also send messages to your care team and make appointments. If you have questions, please call your primary care clinic.  If you do not have a primary care provider, please call [phone] and they will assist you.        Care EveryWhere ID     This is your Care EveryWhere ID. This could be used by other organizations to access your [place] medical records  [id number]        Your Vitals Were     Pulse Height Pulse Oximetry BMI (Body Mass Index)          102 1.854 m (6' 1\") 96% 27.84 kg/m2 "         Blood Pressure from Last 3 Encounters:   09/04/18 138/88   01/08/18 118/72   12/05/17 138/78    Weight from Last 3 Encounters:   10/22/18 95.7 kg (211 lb)   09/04/18 95.8 kg (211 lb 4.8 oz)   01/08/18 96.8 kg (213 lb 8 oz)              We Performed the Following     Bladder scan     UA without Microscopic        Primary Care Provider Office Phone # Fax #    Florentino Silvino Chavez [phone] [address] [facility] [address]        Equal Access to Services     [facility]: Hadii aad ku hadasho Soomaali, waaxda luqadaha, qaybta kaalmada adeegyada, henrietta jung hayaan adejohn arellano . So [facility] [phone].    ATENCIÓN: Si habla español, tiene a sanabria disposición servicios gratuitos de asistencia lingüística. Llame al [phone].    We comply with applicable federal civil rights laws and [place] laws. We do not discriminate on the basis of race, color, national origin, age, disability, sex, sexual orientation, or gender identity.            Thank you!     Thank you for choosing [facility] [place]  for your care. Our goal is always to provide you with excellent care. Hearing back from our patients is one way we can continue to improve our services. Please take a few minutes to complete the written survey that you may receive in the mail after your visit with us. Thank you!             Your Updated Medication List - Protect others around you: Learn how to safely use, store and throw away your medicines at www.disposemymeds.org.          This list is accurate as of 10/22/18  8:49 AM.  Always use your most recent med list.                   Brand Name Dispense Instructions for use Diagnosis    albuterol 108 (90 Base) MCG/ACT inhaler    PROAIR HFA    1 Inhaler    Inhale 1-2 puffs into the lungs every 4 hours as needed for shortness of breath / dyspnea    Moderate persistent asthma with allergic rhinitis without status asthmaticus with acute  exacerbation       cyclobenzaprine 10 MG tablet    FLEXERIL    90 tablet    Take 1 tablet (10 mg) by mouth 3 times daily as needed for muscle spasms    Neck pain       fluticasone 50 MCG/ACT spray    FLONASE    16 mL    SHAKE WELL AND USE 2 SPRAYS IN EACH NOSTRIL DAILY    Moderate persistent asthma with allergic rhinitis without status asthmaticus with acute exacerbation       HYDROcodone-acetaminophen 5-325 MG per tablet    NORCO    45 tablet    Take 1 tablet by mouth every 6 hours as needed for moderate to severe pain    Lumbago of lumbar region with sciatica       omeprazole 20 MG CR capsule    priLOSEC    30 capsule    Take 1 capsule (20 mg) by mouth daily    Gastroesophageal reflux disease with esophagitis, Moderate persistent asthma with allergic rhinitis without status asthmaticus with acute exacerbation       sildenafil 20 MG tablet    REVATIO    60 tablet    TAKE 1 TO 3 TABLETS BY MOUTH 1 HOUR BEFORE INTERCOURSE AS NEEDED    Erectile dysfunction due to diseases classified elsewhere       zolpidem 10 MG tablet    AMBIEN    30 tablet    Take 1 tablet (10 mg) by mouth nightly as needed SEE MD IN JUNE    Primary insomnia

## 2018-10-22 NOTE — NURSING NOTE
Pt has cyst on L testicle that is growing.  Pt had blood after sex.  Cyst is becoming a problem.  Pt has freq day and nt.  Pt wants to talk about finasteride.  Man boobs? And ED from finasteride.  Cyst bothers him when he rides his motorcycle.  Pt denies dysuria.  Pt has pain with intercourse.  TONI Aquino, CMA

## 2018-10-22 NOTE — LETTER
10/22/2018       RE: Christopher Buchanan  [address] [facility] [place] [facility] [place] [facility] [place] [address]     Dear Colleague,    Thank you for referring your patient, Christopher Buchanan, to the [facility] [place] at [facility]. Please see a copy of my visit note below.    [facility]  Main Office: [address]  [address]  [place] [address]       CHIEF COMPLAINT:  Left testicular and epididymal cysts, hematospermia    HISTORY:   This is a 63-year-old gentleman who I saw back in  for a left epididymal cyst. At the time the cyst is asymptomatic and he did not treat cyst. He returns today because the cyst has grown over the years and it is now becoming bothersome. This is especially bothersome when he rides his motorcycle. He is thinking about having it repaired. He also had an episode of hematospermia recently. This was one isolated episode. He reports no hematuria. He reports no pain with ejaculation. He does have mild erectile dysfunction and uses generic Viagra with good success.      PAST MEDICAL HISTORY:   Past Medical History:   Diagnosis Date     ALLERGIC RHINITIS NOS 2003     CARDIOVASCULAR SCREENING; LDL GOAL LESS THAN 160 10/31/2010     Erectile dysfunction 2011     Esophageal reflux 2003     GENERAL OSTEOARTHROSIS 2003     Hydrocele      Hydrocele, left 3/31/2011     Low back pain 2011     Moderate persistent asthma 3/21/2011     Prostatism        PAST SURGICAL HISTORY:   Past Surgical History:   Procedure Laterality Date     C SPLINT      right thumb     EYE SURGERY      YAG procedure       FAMILY HISTORY:   Family History   Problem Relation Age of Onset     C.A.D. Father       at 52 of heart failure       SOCIAL HISTORY:   Social History   Substance Use Topics     Smoking status: Never Smoker     Smokeless tobacco: Never Used     Alcohol use Yes      Comment: 1 beer daily          Allergies   Allergen Reactions      "Gabapentin Palpitations and Shortness Of Breath     Tizanidine Shortness Of Breath     Latex      Other reaction(s): *Unknown  Swelling - localized to eye after eye surger     Penicillins      Noted in 4/21/08 ER, as possible         Current Outpatient Prescriptions:      fluticasone (FLONASE) 50 MCG/ACT spray, SHAKE WELL AND USE 2 SPRAYS IN EACH NOSTRIL DAILY, Disp: 16 mL, Rfl: 3     HYDROcodone-acetaminophen (NORCO) 5-325 MG per tablet, Take 1 tablet by mouth every 6 hours as needed for moderate to severe pain, Disp: 45 tablet, Rfl: 0     omeprazole (PRILOSEC) 20 MG CR capsule, Take 1 capsule (20 mg) by mouth daily, Disp: 30 capsule, Rfl: 11     sildenafil (REVATIO) 20 MG tablet, TAKE 1 TO 3 TABLETS BY MOUTH 1 HOUR BEFORE INTERCOURSE AS NEEDED, Disp: 60 tablet, Rfl: 0     zolpidem (AMBIEN) 10 MG tablet, Take 1 tablet (10 mg) by mouth nightly as needed SEE MD IN JUNE, Disp: 30 tablet, Rfl: 3     albuterol (PROAIR HFA) 108 (90 Base) MCG/ACT inhaler, Inhale 1-2 puffs into the lungs every 4 hours as needed for shortness of breath / dyspnea, Disp: 1 Inhaler, Rfl: 11     cyclobenzaprine (FLEXERIL) 10 MG tablet, Take 1 tablet (10 mg) by mouth 3 times daily as needed for muscle spasms, Disp: 90 tablet, Rfl: 1    PHYSICAL EXAM:    Pulse 102  Ht 1.854 m (6' 1\")  Wt 95.7 kg (211 lb)  SpO2 96%  BMI 27.84 kg/m2  General appearance: In NAD, conversant  HEENT: Normocephalic and atraumatic, anicteric sclera  Cardiovascular: No peripheral edema  Respiratory: normal, non-labored breathing  Gastrointestinal: negative, Abdomen soft, non-tender, and non-distended.   Musculoskeletal: Normal musculature and movements  Peripheral Vascular/extremity: No peripheral edema  Skin: Normal temperature, turgor, and texture. No rash  Psychiatric: Appropriate affect, alert and oriented to person, place, and time    Penis: Normal  Scrotal skin: Normal, no lesions  Testicles: Normal to palpation bilaterally  Epididymis: The left testicle has a " very large epididymal cyst above it. This is causing a mass effect on the right testicle and pushing the right testicle upwards as well.  Lymphatic: Normal inguinal lymph nodes    Digital Rectal Exam: His prostate is slightly enlarged, benign and symmetric to palpation    Cystoscopy: Not done      PSA: He reports a normal PSA recently at [facility]    UA RESULTS:  Recent Labs   Lab Test  03/31/11   1528   COLOR  Yellow   APPEARANCE  Clear   URINEGLC  Negative   URINEBILI  Negative   URINEKETONE  Negative   SG  >1.030   UBLD  Small*   URINEPH  5.0   PROTEIN  Negative   UROBILINOGEN  0.2   NITRITE  Negative   LEUKEST  Negative   RBCU  2-5*   WBCU  O - 2       Bladder Scan: 34mL    Other Labs:      Imaging Studies: Scrotal ultrasound from 2015 shows cyst in the left testicular parenchyma and several left epididymal cysts.      CLINICAL IMPRESSION:   Left epididymal cyst, hematospermia    PLAN:   He has an epididymal cyst that has grown over the years and is now quite large. He is interested in potentially undergoing repair. We discussed epididymal cyst repair in detail today along with its risks and expected recovery. All his questions were answered. He would like to discuss this with his wife before scheduling. Also, he had an episode of hematospermia. History rectal examination is benign and he reports a normal PSA at [facility]. For this I recommended that he continue to have his PSA checked annually with his primary care provider.      Jerel Prado MD

## 2018-12-12 ENCOUNTER — TELEPHONE (OUTPATIENT)
Dept: FAMILY MEDICINE | Facility: CLINIC | Age: 63
End: 2018-12-12

## 2018-12-12 ENCOUNTER — OFFICE VISIT (OUTPATIENT)
Dept: FAMILY MEDICINE | Facility: CLINIC | Age: 63
End: 2018-12-12
Payer: COMMERCIAL

## 2018-12-12 VITALS
SYSTOLIC BLOOD PRESSURE: 142 MMHG | WEIGHT: 216.4 LBS | OXYGEN SATURATION: 96 % | RESPIRATION RATE: 14 BRPM | TEMPERATURE: 98.2 F | DIASTOLIC BLOOD PRESSURE: 84 MMHG | HEART RATE: 106 BPM | BODY MASS INDEX: 28.55 KG/M2

## 2018-12-12 DIAGNOSIS — J01.01 ACUTE RECURRENT MAXILLARY SINUSITIS: Primary | ICD-10-CM

## 2018-12-12 DIAGNOSIS — G47.9 SLEEP DISORDER: ICD-10-CM

## 2018-12-12 PROCEDURE — 99213 OFFICE O/P EST LOW 20 MIN: CPT | Performed by: FAMILY MEDICINE

## 2018-12-12 PROCEDURE — 80307 DRUG TEST PRSMV CHEM ANLYZR: CPT | Mod: 90 | Performed by: FAMILY MEDICINE

## 2018-12-12 PROCEDURE — 99000 SPECIMEN HANDLING OFFICE-LAB: CPT | Performed by: FAMILY MEDICINE

## 2018-12-12 RX ORDER — AZITHROMYCIN 250 MG/1
TABLET, FILM COATED ORAL
Qty: 6 TABLET | Refills: 0 | Status: SHIPPED | OUTPATIENT
Start: 2018-12-12 | End: 2019-03-27

## 2018-12-12 RX ORDER — ZOLPIDEM TARTRATE 10 MG/1
10 TABLET ORAL
Qty: 30 TABLET | Refills: 0 | Status: SHIPPED | OUTPATIENT
Start: 2018-12-12 | End: 2019-03-27

## 2018-12-12 NOTE — PROGRESS NOTES
SUBJECTIVE:   Christopher Buchanan is a 63 year old male who presents to clinic today for the following health issues:      RESPIRATORY SYMPTOMS      Duration: one week    Description  rhinorrhea, cough, fatigue/malaise and tight chest    Severity: severe    Accompanying signs and symptoms: green phlegm     History (predisposing factors):  asthma    Precipitating or alleviating factors: traveling     Therapies tried and outcome:  Mucinex    Past Medical History:   Diagnosis Date     ALLERGIC RHINITIS NOS 2003     CARDIOVASCULAR SCREENING; LDL GOAL LESS THAN 160 10/31/2010     Erectile dysfunction 2011     Esophageal reflux 2003     GENERAL OSTEOARTHROSIS 2003     Hernia, abdominal      Hydrocele      Hydrocele, left 3/31/2011     Low back pain 2011     Moderate persistent asthma 3/21/2011     Mumps      Prostatism      Tuberculosis        Past Surgical History:   Procedure Laterality Date     C SPLINT      right thumb     EYE SURGERY      YAG procedure       Family History   Problem Relation Age of Onset     C.A.D. Father          at 52 of heart failure       Social History     Tobacco Use     Smoking status: Never Smoker     Smokeless tobacco: Never Used   Substance Use Topics     Alcohol use: Yes     Comment: 1 beer daily     Patient complains of congestion with sore throat, nasal congestion and sinus pain. Some mucopurulant discharge but no upper dental pain and no sustained fever. Duration less than one week, travelled out of state last week .  Has history of airborn allergy or asthma.   No chest pain, diaphoresis, or sob.  moderatelyproductive cough.  TMs dull  Not *red, sinus tenderness left   lungs clear, s1s2,soft abd,no distress, cyanosis or stridor.  A:URI with acute maxillary sinusitis   P:fluids, antipyretics,rest and meds as directed.  Recheck PRN worsening or non-improvement over 5 days.  Discussed signs of systemic or constutional

## 2018-12-12 NOTE — TELEPHONE ENCOUNTER
Fax from [facility], zolpidem needs PA or alternate sent, routed to Florentino Chavez MD, please advise plan, PA started via [facility]    KEY: [email or web address]    Alix Weaver RN, BSN  Message handled by Nurse Triage.

## 2018-12-12 NOTE — LETTER
My Asthma Action Plan  Name: Christopher Buchanan   YOB: 1955  Date: 12/12/2018   My doctor: Florenitno Chavez MD   My clinic: [facility]        My Control Medicine: Flonase  My Rescue Medicine: Albuterol   My Asthma Severity: intermittent  Avoid your asthma triggers: cold air               GREEN ZONE   Good Control    I feel good    No cough or wheeze    Can work, sleep and play without asthma symptoms       Take your asthma control medicine every day.     1. If exercise triggers your asthma, take your rescue medication    15 minutes before exercise or sports, and    During exercise if you have asthma symptoms  2. Spacer to use with inhaler: If you have a spacer, make sure to use it with your inhaler             YELLOW ZONE Getting Worse  I have ANY of these:    I do not feel good    Cough or wheeze    Chest feels tight    Wake up at night   1. Keep taking your Green Zone medications  2. Start taking your rescue medicine:    every 20 minutes for up to 1 hour. Then every 4 hours for 24-48 hours.  3. If you stay in the Yellow Zone for more than 12-24 hours, contact your doctor.  4. If you do not return to the Green Zone in 12-24 hours or you get worse, start taking your oral steroid medicine if prescribed by your provider.           RED ZONE Medical Alert - Get Help  I have ANY of these:    I feel awful    Medicine is not helping    Breathing getting harder    Trouble walking or talking    Nose opens wide to breathe       1. Take your rescue medicine NOW  2. If your provider has prescribed an oral steroid medicine, start taking it NOW  3. Call your doctor NOW  4. If you are still in the Red Zone after 20 minutes and you have not reached your doctor:    Take your rescue medicine again and    Call 911 or go to the emergency room right away    See your regular doctor within 2 weeks of an Emergency Room or Urgent Care visit for follow-up treatment.          Annual Reminders:  Meet with  Asthma Educator,  Flu Shot in the Fall, consider Pneumonia Vaccination for patients with asthma (aged 19 and older).    Pharmacy:    [facility] [address] [place], [place] - [address] AT [facility] [address]  [facility] [address] [place], [place] - [address] AT [facility] [address]  [facility] [address] [place], [place] - [address] [facility] [address]  [facility] [place], [place] - [address] [facility] - [address]                      Asthma Triggers  How To Control Things That Make Your Asthma Worse    Triggers are things that make your asthma worse.  Look at the list below to help you find your triggers and what you can do about them.  You can help prevent asthma flare-ups by staying away from your triggers.      Trigger                                                          What you can do   Cigarette Smoke  Tobacco smoke can make asthma worse. Do not allow smoking in your home, car or around you.  Be sure no one smokes at a child s day care or school.  If you smoke, ask your health care provider for ways to help you quit.  Ask family members to quit too.  Ask your health care provider for a referral to Quit Plan to help you quit smoking, or call 2-200-396-PLAN.     Colds, Flu, Bronchitis  These are common triggers of asthma. Wash your hands often.  Don t touch your eyes, nose or mouth.  Get a flu shot every year.     Dust Mites  These are tiny bugs that live in cloth or carpet. They are too small to see. Wash sheets and blankets in hot water every week.   Encase pillows and mattress in dust mite proof covers.  Avoid having carpet if you can. If you have carpet, vacuum weekly.   Use a dust mask and HEPA vacuum.   Pollen and Outdoor Mold  Some people are allergic to trees, grass, or weed pollen, or molds. Try to keep your windows closed.  Limit time out doors when pollen count is high.   Ask you health  care provider about taking medicine during allergy season.     Animal Dander  Some people are allergic to skin flakes, urine or saliva from pets with fur or feathers. Keep pets with fur or feathers out of your home.    If you can t keep the pet outdoors, then keep the pet out of your bedroom.  Keep the bedroom door closed.  Keep pets off cloth furniture and away from stuffed toys.     Mice, Rats, and Cockroaches  Some people are allergic to the waste from these pests.   Cover food and garbage.  Clean up spills and food crumbs.  Store grease in the refrigerator.   Keep food out of the bedroom.   Indoor Mold  This can be a trigger if your home has high moisture. Fix leaking faucets, pipes, or other sources of water.   Clean moldy surfaces.  Dehumidify basement if it is damp and smelly.   Smoke, Strong Odors, and Sprays  These can reduce air quality. Stay away from strong odors and sprays, such as perfume, powder, hair spray, paints, smoke incense, paint, cleaning products, candles and new carpet.   Exercise or Sports  Some people with asthma have this trigger. Be active!  Ask your doctor about taking medicine before sports or exercise to prevent symptoms.    Warm up for 5-10 minutes before and after sports or exercise.     Other Triggers of Asthma  Cold air:  Cover your nose and mouth with a scarf.  Sometimes laughing or crying can be a trigger.  Some medicines and food can trigger asthma.

## 2018-12-13 NOTE — TELEPHONE ENCOUNTER
There's no pa grounds, let him know he should just pay cash for his infrequent travel use. Since it's refused. (medicare doesn't cover it)

## 2018-12-13 NOTE — TELEPHONE ENCOUNTER
Pharmacy informed, Tali, they will inform pt  Alix Weaver RN, BSN  Message handled by Nurse Triage.

## 2018-12-16 LAB — COMPREHEN DRUG ANALYSIS UR: NORMAL

## 2019-03-27 ENCOUNTER — OFFICE VISIT (OUTPATIENT)
Dept: FAMILY MEDICINE | Facility: CLINIC | Age: 64
End: 2019-03-27
Payer: COMMERCIAL

## 2019-03-27 DIAGNOSIS — G47.9 SLEEP DISORDER: ICD-10-CM

## 2019-03-27 DIAGNOSIS — N52.1 ERECTILE DYSFUNCTION DUE TO DISEASES CLASSIFIED ELSEWHERE: ICD-10-CM

## 2019-03-27 DIAGNOSIS — M54.40 LUMBAGO OF LUMBAR REGION WITH SCIATICA: ICD-10-CM

## 2019-03-27 PROCEDURE — 99213 OFFICE O/P EST LOW 20 MIN: CPT | Performed by: FAMILY MEDICINE

## 2019-03-27 RX ORDER — SILDENAFIL CITRATE 20 MG/1
TABLET ORAL
Qty: 80 TABLET | Refills: 0 | Status: SHIPPED | OUTPATIENT
Start: 2019-03-27 | End: 2019-09-11

## 2019-03-27 RX ORDER — ZOLPIDEM TARTRATE 10 MG/1
10 TABLET ORAL
Qty: 30 TABLET | Refills: 0 | Status: SHIPPED | OUTPATIENT
Start: 2019-03-27 | End: 2019-09-11

## 2019-03-27 RX ORDER — HYDROCODONE BITARTRATE AND ACETAMINOPHEN 5; 325 MG/1; MG/1
1 TABLET ORAL EVERY 6 HOURS PRN
Qty: 45 TABLET | Refills: 0 | Status: SHIPPED | OUTPATIENT
Start: 2019-03-27 | End: 2019-09-11

## 2019-03-27 ASSESSMENT — MIFFLIN-ST. JEOR: SCORE: 1838.61

## 2019-03-27 NOTE — PROGRESS NOTES
SUBJECTIVE:   Christopher Buchanan is a 63 year old male who presents to clinic today for the following health issues:      Patient is here for a medication check.    Back Subjective:         Symptoms began:   4 week(s) ago       Symptoms changing:  onset gradual and onset after new activity and travel and are unchanged.                  Location:  low back bilateral region       Radiation to radiates into the right leg       At worst a 6 on a scale of 1-10.         Personal hx of back pain is:  recurrent self limited episodes of low back pain in the past, previous osteoarthritis of lumbar spine and previous degenerative joint disease of the lumbar spine.       Pain is exacerbated by: lifting, standing, walking and sitting.       Pain is relieved by: ice and rest.       Associated sx include: none.       Previous plain films obtained: No.        Results: ddd.       Red flag symptoms: negative.  On exam the vital signs are stable  Weight is Body mass index is 29.08 kg/m .   Eyes show amarilis  No neck masses or thyromegaly.Ear nose and throat shows normal   No bruits, murmers, rubs or extrasounds. No cardiomegaly or chest wall tenderness. Lungs clear, no abdominal masses or organomegaly. No CVA tenderness.  Skin eval no rash   No hernias, good range of motion neck, back and extremities. No abnormal skin lesions. Normal genitalia. Good peripheral pulses. No adenopathy.  Normal gait and stance. Neck is supple.      (G47.9) Sleep disorder  Comment: nsaids and meds   Plan: zolpidem (AMBIEN) 10 MG tablet        Consider back PT     (M54.40) Lumbago of lumbar region with sciatica  Comment:   Plan: HYDROcodone-acetaminophen (NORCO) 5-325 MG         tablet        Sporadic intermittent use, three month follow up

## 2019-03-28 VITALS
RESPIRATION RATE: 14 BRPM | HEIGHT: 73 IN | DIASTOLIC BLOOD PRESSURE: 85 MMHG | TEMPERATURE: 97.5 F | HEART RATE: 97 BPM | SYSTOLIC BLOOD PRESSURE: 135 MMHG | OXYGEN SATURATION: 92 % | WEIGHT: 220.4 LBS | BODY MASS INDEX: 29.21 KG/M2

## 2019-09-09 ASSESSMENT — ENCOUNTER SYMPTOMS
CONSTIPATION: 0
DIZZINESS: 0
JOINT SWELLING: 0
FREQUENCY: 0
CHILLS: 0
HEMATURIA: 0
FEVER: 0
WEAKNESS: 0
HEADACHES: 0
NAUSEA: 0
NERVOUS/ANXIOUS: 0
PARESTHESIAS: 0
DYSURIA: 0
HEARTBURN: 0
SHORTNESS OF BREATH: 0
ARTHRALGIAS: 1
COUGH: 0
MYALGIAS: 0
ABDOMINAL PAIN: 0
DIARRHEA: 0
EYE PAIN: 0
HEMATOCHEZIA: 0
PALPITATIONS: 0

## 2019-09-09 ASSESSMENT — ACTIVITIES OF DAILY LIVING (ADL): CURRENT_FUNCTION: NO ASSISTANCE NEEDED

## 2019-09-11 ENCOUNTER — OFFICE VISIT (OUTPATIENT)
Dept: FAMILY MEDICINE | Facility: CLINIC | Age: 64
End: 2019-09-11
Payer: COMMERCIAL

## 2019-09-11 VITALS
TEMPERATURE: 98.6 F | BODY MASS INDEX: 28.47 KG/M2 | DIASTOLIC BLOOD PRESSURE: 74 MMHG | WEIGHT: 214.8 LBS | SYSTOLIC BLOOD PRESSURE: 112 MMHG | HEART RATE: 79 BPM | HEIGHT: 73 IN | RESPIRATION RATE: 14 BRPM | OXYGEN SATURATION: 97 %

## 2019-09-11 DIAGNOSIS — Z12.11 SPECIAL SCREENING FOR MALIGNANT NEOPLASMS, COLON: ICD-10-CM

## 2019-09-11 DIAGNOSIS — Z12.5 SCREENING FOR PROSTATE CANCER: ICD-10-CM

## 2019-09-11 DIAGNOSIS — K21.00 GASTROESOPHAGEAL REFLUX DISEASE WITH ESOPHAGITIS: ICD-10-CM

## 2019-09-11 DIAGNOSIS — J45.41: ICD-10-CM

## 2019-09-11 DIAGNOSIS — N52.9 ERECTILE DYSFUNCTION, UNSPECIFIED ERECTILE DYSFUNCTION TYPE: ICD-10-CM

## 2019-09-11 DIAGNOSIS — Z23 NEED FOR PROPHYLACTIC VACCINATION AND INOCULATION AGAINST INFLUENZA: ICD-10-CM

## 2019-09-11 DIAGNOSIS — Z00.00 ENCOUNTER FOR MEDICARE ANNUAL WELLNESS EXAM: Primary | ICD-10-CM

## 2019-09-11 DIAGNOSIS — G47.9 SLEEP DISORDER: ICD-10-CM

## 2019-09-11 DIAGNOSIS — M54.40 LUMBAGO OF LUMBAR REGION WITH SCIATICA: ICD-10-CM

## 2019-09-11 LAB
ALBUMIN SERPL-MCNC: 3.9 G/DL (ref 3.4–5)
ALP SERPL-CCNC: 60 U/L (ref 40–150)
ALT SERPL W P-5'-P-CCNC: 30 U/L (ref 0–70)
ANION GAP SERPL CALCULATED.3IONS-SCNC: 2 MMOL/L (ref 3–14)
AST SERPL W P-5'-P-CCNC: 17 U/L (ref 0–45)
BILIRUB SERPL-MCNC: 0.6 MG/DL (ref 0.2–1.3)
BUN SERPL-MCNC: 15 MG/DL (ref 7–30)
CALCIUM SERPL-MCNC: 9 MG/DL (ref 8.5–10.1)
CHLORIDE SERPL-SCNC: 104 MMOL/L (ref 94–109)
CHOLEST SERPL-MCNC: 207 MG/DL
CO2 SERPL-SCNC: 30 MMOL/L (ref 20–32)
CREAT SERPL-MCNC: 0.9 MG/DL (ref 0.66–1.25)
GFR SERPL CREATININE-BSD FRML MDRD: 89 ML/MIN/{1.73_M2}
GLUCOSE SERPL-MCNC: 92 MG/DL (ref 70–99)
HDLC SERPL-MCNC: 49 MG/DL
LDLC SERPL CALC-MCNC: 131 MG/DL
NONHDLC SERPL-MCNC: 158 MG/DL
POTASSIUM SERPL-SCNC: 4.3 MMOL/L (ref 3.4–5.3)
PROT SERPL-MCNC: 7.1 G/DL (ref 6.8–8.8)
SODIUM SERPL-SCNC: 136 MMOL/L (ref 133–144)
TRIGL SERPL-MCNC: 136 MG/DL

## 2019-09-11 PROCEDURE — 80053 COMPREHEN METABOLIC PANEL: CPT | Performed by: FAMILY MEDICINE

## 2019-09-11 PROCEDURE — 90662 IIV NO PRSV INCREASED AG IM: CPT | Performed by: FAMILY MEDICINE

## 2019-09-11 PROCEDURE — 99397 PER PM REEVAL EST PAT 65+ YR: CPT | Mod: 25 | Performed by: FAMILY MEDICINE

## 2019-09-11 PROCEDURE — 80061 LIPID PANEL: CPT | Performed by: FAMILY MEDICINE

## 2019-09-11 PROCEDURE — 90471 IMMUNIZATION ADMIN: CPT | Performed by: FAMILY MEDICINE

## 2019-09-11 PROCEDURE — 36415 COLL VENOUS BLD VENIPUNCTURE: CPT | Performed by: FAMILY MEDICINE

## 2019-09-11 PROCEDURE — G0103 PSA SCREENING: HCPCS | Performed by: FAMILY MEDICINE

## 2019-09-11 RX ORDER — ZOLPIDEM TARTRATE 10 MG/1
10 TABLET ORAL
Qty: 30 TABLET | Refills: 0 | Status: SHIPPED | OUTPATIENT
Start: 2019-09-11 | End: 2020-01-03

## 2019-09-11 RX ORDER — FLUTICASONE PROPIONATE 50 MCG
SPRAY, SUSPENSION (ML) NASAL
Qty: 16 ML | Refills: 3 | Status: SHIPPED | OUTPATIENT
Start: 2019-09-11 | End: 2020-09-03

## 2019-09-11 RX ORDER — CYCLOBENZAPRINE HCL 5 MG
5 TABLET ORAL 3 TIMES DAILY PRN
Qty: 90 TABLET | Refills: 1 | Status: SHIPPED | OUTPATIENT
Start: 2019-09-11 | End: 2022-06-01

## 2019-09-11 RX ORDER — ALBUTEROL SULFATE 90 UG/1
1-2 AEROSOL, METERED RESPIRATORY (INHALATION) EVERY 4 HOURS PRN
Qty: 1 INHALER | Refills: 11 | Status: SHIPPED | OUTPATIENT
Start: 2019-09-11 | End: 2020-06-11

## 2019-09-11 RX ORDER — HYDROCODONE BITARTRATE AND ACETAMINOPHEN 5; 325 MG/1; MG/1
1 TABLET ORAL EVERY 6 HOURS PRN
Qty: 45 TABLET | Refills: 0 | Status: SHIPPED | OUTPATIENT
Start: 2019-09-11 | End: 2020-05-28

## 2019-09-11 ASSESSMENT — ENCOUNTER SYMPTOMS
HEADACHES: 0
CHILLS: 0
PALPITATIONS: 0
CONSTIPATION: 0
ARTHRALGIAS: 1
HEMATOCHEZIA: 0
MYALGIAS: 0
SHORTNESS OF BREATH: 0
COUGH: 0
DIARRHEA: 0
NERVOUS/ANXIOUS: 0
JOINT SWELLING: 0
FREQUENCY: 0
ABDOMINAL PAIN: 0
HEARTBURN: 0
NAUSEA: 0
EYE PAIN: 0
DIZZINESS: 0
DYSURIA: 0
FEVER: 0
PARESTHESIAS: 0
HEMATURIA: 0
WEAKNESS: 0

## 2019-09-11 ASSESSMENT — MIFFLIN-ST. JEOR: SCORE: 1813.21

## 2019-09-11 ASSESSMENT — ACTIVITIES OF DAILY LIVING (ADL): CURRENT_FUNCTION: NO ASSISTANCE NEEDED

## 2019-09-11 NOTE — PATIENT INSTRUCTIONS
Patient Education   Personalized Prevention Plan  You are due for the preventive services outlined below.  Your care team is available to assist you in scheduling these services.  If you have already completed any of these items, please share that information with your care team to update in your medical record.  Health Maintenance Due   Topic Date Due     ANNUAL REVIEW OF HM ORDERS  1955     Discuss Advance Care Planning  1955     Colonscopy  09/12/1963     Zoster (Shingles) Vaccine (1 of 2) 09/12/2003     FALL RISK ASSESSMENT  09/12/2018     Pneumococcal Vaccine (2 of 2 - PPSV23) 09/12/2018     AORTIC ANEURYSM SCREENING (SYSTEM ASSIGNED)  09/12/2018     Asthma Control Test  03/04/2019     Flu Vaccine (1) 09/01/2019     Anxiety Assessment  09/04/2019     Diptheria Tetanus Pertussis (DTAP/TDAP/TD) Vaccine (3 - Td) 09/01/2019     Annual Wellness Visit  09/04/2019     Depression Assessment  09/04/2019

## 2019-09-11 NOTE — PROGRESS NOTES
"SUBJECTIVE:   Christopher Buchanan is a 63 year old male who presents for Preventive Visit.      Are you in the first 12 months of your Medicare coverage?  Yes,  Visual Acuity:  Right Eye: 20/25   Left Eye: 20/25  Both Eyes: amarilis, fundi normal     Healthy Habits:     In general, how would you rate your overall health?  Good    Frequency of exercise:  4-5 days/week    Duration of exercise:  15-30 minutes    Do you usually eat at least 4 servings of fruit and vegetables a day, include whole grains    & fiber and avoid regularly eating high fat or \"junk\" foods?  Yes    Taking medications regularly:  Yes    Medication side effects:  Other    Ability to successfully perform activities of daily living:  No assistance needed    Home Safety:  No safety concerns identified    Hearing Impairment:  No hearing concerns    In the past 6 months, have you been bothered by leaking of urine?  No    In general, how would you rate your overall mental or emotional health?  Good      PHQ-2 Total Score: 0    Additional concerns today:  Yes    Do you feel safe in your environment? Yes    Do you have a Health Care Directive?       Fall risk       Cognitive Screening   1) Repeat 3 items (Leader, Season, Table)    2) Clock draw: NORMAL  3) 3 item recall: Recalls 1 object   Results: NORMAL clock, 1-2 items recalled: COGNITIVE IMPAIRMENT LESS LIKELY    Mini-CogTM Copyright S Caleb. Licensed by the author for use in [facility]; reprinted with permission (chucho@.[facility]). All rights reserved.      Do you have sleep apnea, excessive snoring or daytime drowsiness?: no    Reviewed and updated as needed this visit by clinical staff         Reviewed and updated as needed this visit by Provider        Social History     Tobacco Use     Smoking status: Never Smoker     Smokeless tobacco: Never Used   Substance Use Topics     Alcohol use: Yes     Comment: 1 beer daily         Alcohol Use 9/9/2019   Prescreen: >3 drinks/day or >7 drinks/week? No "   Prescreen: >3 drinks/day or >7 drinks/week? -   no additional fall risks            Current providers sharing in care for this patient include:   Patient Care Team:  Florentino Chavez MD as PCP - General (Family Practice)  Florentino Chavez MD as Assigned PCP    The following health maintenance items are reviewed in Epic and correct as of today:  Health Maintenance   Topic Date Due     ANNUAL REVIEW OF HM ORDERS  1955     ADVANCE CARE PLANNING  1955     COLONOSCOPY  09/12/1963     ZOSTER IMMUNIZATION (1 of 2) 09/12/2003     FALL RISK ASSESSMENT  09/12/2018     PNEUMOCOCCAL IMMUNIZATION 65+ LOW/MEDIUM RISK (2 of 2 - PPSV23) 09/12/2018     AORTIC ANEURYSM SCREENING (SYSTEM ASSIGNED)  09/12/2018     ASTHMA CONTROL TEST  03/04/2019     INFLUENZA VACCINE (1) 09/01/2019     JUDITH ASSESSMENT  09/04/2019     DTAP/TDAP/TD IMMUNIZATION (3 - Td) 09/01/2019     MEDICARE ANNUAL WELLNESS VISIT  09/04/2019     PHQ-9  09/04/2019     URINE DRUG SCREEN  12/12/2019     ASTHMA ACTION PLAN  12/12/2019     LIPID  09/04/2023     HEPATITIS C SCREENING  Completed     HIV SCREENING  Completed     IPV IMMUNIZATION  Aged Out     MENINGITIS IMMUNIZATION  Aged Out     BP Readings from Last 3 Encounters:   09/11/19 112/74   03/27/19 135/85   12/12/18 142/84    Wt Readings from Last 3 Encounters:   09/11/19 97.4 kg (214 lb 12.8 oz)   03/27/19 100 kg (220 lb 6.4 oz)   12/12/18 98.2 kg (216 lb 6.4 oz)                  Patient Active Problem List   Diagnosis     Allergic rhinitis     Generalized osteoarthrosis, unspecified site     Esophageal reflux     CARDIOVASCULAR SCREENING; LDL GOAL LESS THAN 160     Moderate persistent asthma     Prostatism     Hydrocele, left     Erectile dysfunction     Right low back pain, with sciatica presence unspecified     Other chronic pain     Hip joint painful on movement, unspecified laterality     Paroxysmal supraventricular tachycardia (H)     Past Surgical History:   Procedure Laterality  "Date     C SPLINT      right thumb     EYE SURGERY      YAG procedure       Social History     Tobacco Use     Smoking status: Never Smoker     Smokeless tobacco: Never Used   Substance Use Topics     Alcohol use: Yes     Comment: 1 beer daily     Family History   Problem Relation Age of Onset     C.A.D. Father          at 52 of heart failure         Pneumonia Vaccine:Adults age 65+ who received Pneumovax (PPSV23) at 65 years or older: Should be given PCV13 > 1 year after their most recent PPSV23    Review of Systems   Constitutional: Negative for chills and fever.   HENT: Negative for congestion, ear pain and hearing loss.    Eyes: Negative for pain and visual disturbance.   Respiratory: Negative for cough and shortness of breath.    Cardiovascular: Negative for chest pain, palpitations and peripheral edema.   Gastrointestinal: Negative for abdominal pain, constipation, diarrhea, heartburn, hematochezia and nausea.   Genitourinary: Positive for impotence. Negative for dysuria, frequency, genital sores, hematuria and urgency.   Musculoskeletal: Positive for arthralgias. Negative for joint swelling and myalgias.   Neurological: Negative for dizziness, weakness, headaches and paresthesias.   Psychiatric/Behavioral: Negative for mood changes. The patient is not nervous/anxious.      Constitutional, HEENT, cardiovascular, pulmonary, GI, , musculoskeletal, neuro, skin, endocrine and psych systems are negative, except as otherwise noted.    OBJECTIVE:   There were no vitals taken for this visit. Estimated body mass index is 29.08 kg/m  as calculated from the following:    Height as of 3/27/19: 1.854 m (6' 1\").    Weight as of 3/27/19: 100 kg (220 lb 6.4 oz).  Physical Exam  GENERAL: healthy, alert and no distress  EYES: Eyes grossly normal to inspection, PERRL and conjunctivae and sclerae normal  HENT: ear canals and TM's normal, nose and mouth without ulcers or lesions  NECK: no adenopathy, no asymmetry, masses, or " scars and thyroid normal to palpation  RESP: lungs clear to auscultation - no rales, rhonchi or wheezes  CV: regular rate and rhythm, normal S1 S2, no S3 or S4, no murmur, click or rub, no peripheral edema and peripheral pulses strong  ABDOMEN: soft, nontender, no hepatosplenomegaly, no masses and bowel sounds normal  MS: no gross musculoskeletal defects noted, no edema  SKIN: no suspicious lesions or rashes  NEURO: Normal strength and tone, mentation intact and speech normal  PSYCH: mentation appears normal, affect normal/bright    Diagnostic Test Results:  Labs reviewed in Epic    ASSESSMENT / PLAN:   1. Encounter for Medicare annual wellness exam  Feels well, travels on and off all year   - Lipid panel reflex to direct LDL Fasting  - Comprehensive metabolic panel    2. Moderate persistent asthma with allergic rhinitis without status asthmaticus with acute exacerbation  Prn meds effective , not much heartburn  - albuterol (PROAIR HFA) 108 (90 Base) MCG/ACT inhaler; Inhale 1-2 puffs into the lungs every 4 hours as needed for shortness of breath / dyspnea  Dispense: 1 Inhaler; Refill: 11  - fluticasone (FLONASE) 50 MCG/ACT nasal spray; SHAKE WELL AND USE 2 SPRAYS IN EACH NOSTRIL DAILY  Dispense: 16 mL; Refill: 3  - omeprazole (PRILOSEC) 20 MG DR capsule; Take 1 capsule (20 mg) by mouth daily  Dispense: 30 capsule; Refill: 11    3. Lumbago of lumbar region with sciatica  Aggravated by acitivity, relief by res   - cyclobenzaprine (FLEXERIL) 5 MG tablet; Take 1 tablet (5 mg) by mouth 3 times daily as needed for muscle spasms  Dispense: 90 tablet; Refill: 1  - HYDROcodone-acetaminophen (NORCO) 5-325 MG tablet; Take 1 tablet by mouth every 6 hours as needed for moderate to severe pain  Dispense: 45 tablet; Refill: 0  - [facility]FERR[facility]; Future    4. Gastroesophageal reflux disease with esophagitis  Improved   - omeprazole (PRILOSEC) 20 MG DR capsule; Take 1 capsule (20 mg) by mouth daily  Dispense:  "30 capsule; Refill: 11    5. Sleep disorder    - zolpidem (AMBIEN) 10 MG tablet; Take 1 tablet (10 mg) by mouth nightly as needed for sleep  Dispense: 30 tablet; Refill: 0    6. Erectile dysfunction, unspecified erectile dysfunction type  Trial of tadalifil  - COMPOUNDED NON-CONTROLLED SUBSTANCE (CMPD RX) - PHARMACY TO MIX COMPOUNDED MEDICATION; Tadalafil troches, 17.5 mg   One every 48 hours as needed for sexual activity  Dispense: 10 each; Refill: 3    7. Screening for prostate cancer    - PSA, screen    8. Need for prophylactic vaccination and inoculation against influenza    - FLU VACCINE, INCREASED ANTIGEN, PRESV FREE, AGE 65+ [11254]  - Vaccine Administration, Initial [97495]    9. Special screening for malignant neoplasms, colon    - Fecal colorectal cancer screen (FIT); Future    End of Life Planning:  Patient currently has an advanced directive:     COUNSELING:  Reviewed preventive health counseling, as reflected in patient instructions       Regular exercise       Healthy diet/nutrition    Estimated body mass index is 29.08 kg/m  as calculated from the following:    Height as of 3/27/19: 1.854 m (6' 1\").    Weight as of 3/27/19: 100 kg (220 lb 6.4 oz).    Weight management plan: Discussed healthy diet and exercise guidelines     reports that he has never smoked. He has never used smokeless tobacco.      Appropriate preventive services were discussed with this patient, including applicable screening as appropriate for cardiovascular disease, diabetes, osteopenia/osteoporosis, and glaucoma.  As appropriate for age/gender, discussed screening for colorectal cancer, prostate cancer, breast cancer, and cervical cancer. Checklist reviewing preventive services available has been given to the patient.    Reviewed patients plan of care and provided an AVS. The Intermediate Care Plan ( asthma action plan, low back pain action plan, and migraine action plan) for Christopher meets the Care Plan requirement. This Care Plan " has been established and reviewed with the Patient.    Counseling Resources:  ATP IV Guidelines  Pooled Cohorts Equation Calculator  Breast Cancer Risk Calculator  FRAX Risk Assessment  ICSI Preventive Guidelines  Dietary Guidelines for Americans, 2010  USDA's MyPlate  ASA Prophylaxis  Lung CA Screening    Florentino Chavez MD  [facility]    Identified Health Risks:

## 2019-09-12 LAB — PSA SERPL-ACNC: 3.77 UG/L (ref 0–4)

## 2019-09-18 DIAGNOSIS — Z12.11 SPECIAL SCREENING FOR MALIGNANT NEOPLASMS, COLON: ICD-10-CM

## 2019-09-18 PROCEDURE — 82274 ASSAY TEST FOR BLOOD FECAL: CPT | Performed by: FAMILY MEDICINE

## 2019-09-24 LAB — HEMOCCULT STL QL IA: NEGATIVE

## 2019-10-01 ENCOUNTER — HEALTH MAINTENANCE LETTER (OUTPATIENT)
Age: 64
End: 2019-10-01

## 2020-01-03 ENCOUNTER — OFFICE VISIT (OUTPATIENT)
Dept: FAMILY MEDICINE | Facility: CLINIC | Age: 65
End: 2020-01-03
Payer: COMMERCIAL

## 2020-01-03 VITALS
BODY MASS INDEX: 28.93 KG/M2 | TEMPERATURE: 98.3 F | DIASTOLIC BLOOD PRESSURE: 88 MMHG | SYSTOLIC BLOOD PRESSURE: 134 MMHG | HEART RATE: 80 BPM | WEIGHT: 219.3 LBS

## 2020-01-03 DIAGNOSIS — G89.29 OTHER CHRONIC PAIN: ICD-10-CM

## 2020-01-03 DIAGNOSIS — J45.41: ICD-10-CM

## 2020-01-03 DIAGNOSIS — N52.9 ERECTILE DYSFUNCTION, UNSPECIFIED ERECTILE DYSFUNCTION TYPE: ICD-10-CM

## 2020-01-03 DIAGNOSIS — Z23 NEED FOR SHINGLES VACCINE: Primary | ICD-10-CM

## 2020-01-03 DIAGNOSIS — K21.00 GASTROESOPHAGEAL REFLUX DISEASE WITH ESOPHAGITIS: ICD-10-CM

## 2020-01-03 DIAGNOSIS — G47.9 SLEEP DISORDER: ICD-10-CM

## 2020-01-03 DIAGNOSIS — Z23 NEED FOR PROPHYLACTIC VACCINATION WITH TETANUS-DIPHTHERIA (TD): ICD-10-CM

## 2020-01-03 DIAGNOSIS — M54.50 ACUTE MIDLINE LOW BACK PAIN WITHOUT SCIATICA: ICD-10-CM

## 2020-01-03 PROCEDURE — 80307 DRUG TEST PRSMV CHEM ANLYZR: CPT | Mod: 90 | Performed by: FAMILY MEDICINE

## 2020-01-03 PROCEDURE — 90472 IMMUNIZATION ADMIN EACH ADD: CPT | Performed by: FAMILY MEDICINE

## 2020-01-03 PROCEDURE — 90732 PPSV23 VACC 2 YRS+ SUBQ/IM: CPT | Performed by: FAMILY MEDICINE

## 2020-01-03 PROCEDURE — 90714 TD VACC NO PRESV 7 YRS+ IM: CPT | Performed by: FAMILY MEDICINE

## 2020-01-03 PROCEDURE — 90471 IMMUNIZATION ADMIN: CPT | Performed by: FAMILY MEDICINE

## 2020-01-03 PROCEDURE — 99000 SPECIMEN HANDLING OFFICE-LAB: CPT | Performed by: FAMILY MEDICINE

## 2020-01-03 PROCEDURE — 99214 OFFICE O/P EST MOD 30 MIN: CPT | Mod: 25 | Performed by: FAMILY MEDICINE

## 2020-01-03 PROCEDURE — 90750 HZV VACC RECOMBINANT IM: CPT | Performed by: FAMILY MEDICINE

## 2020-01-03 RX ORDER — HYDROCODONE BITARTRATE AND ACETAMINOPHEN 5; 325 MG/1; MG/1
1 TABLET ORAL EVERY 6 HOURS PRN
Qty: 25 TABLET | Refills: 0 | Status: SHIPPED | OUTPATIENT
Start: 2020-01-03 | End: 2020-01-06

## 2020-01-03 RX ORDER — SILDENAFIL CITRATE 20 MG/1
40 TABLET ORAL DAILY
Qty: 60 TABLET | Refills: 3 | Status: SHIPPED | OUTPATIENT
Start: 2020-01-03 | End: 2020-07-03

## 2020-01-03 RX ORDER — ZOLPIDEM TARTRATE 10 MG/1
10 TABLET ORAL
Qty: 30 TABLET | Refills: 0 | Status: SHIPPED | OUTPATIENT
Start: 2020-01-03 | End: 2020-05-28

## 2020-01-03 ASSESSMENT — ANXIETY QUESTIONNAIRES
5. BEING SO RESTLESS THAT IT IS HARD TO SIT STILL: NOT AT ALL
GAD7 TOTAL SCORE: 0
GAD7 TOTAL SCORE: 0
6. BECOMING EASILY ANNOYED OR IRRITABLE: NOT AT ALL
1. FEELING NERVOUS, ANXIOUS, OR ON EDGE: NOT AT ALL
4. TROUBLE RELAXING: NOT AT ALL
7. FEELING AFRAID AS IF SOMETHING AWFUL MIGHT HAPPEN: NOT AT ALL
GAD7 TOTAL SCORE: 0
3. WORRYING TOO MUCH ABOUT DIFFERENT THINGS: NOT AT ALL
2. NOT BEING ABLE TO STOP OR CONTROL WORRYING: NOT AT ALL
7. FEELING AFRAID AS IF SOMETHING AWFUL MIGHT HAPPEN: NOT AT ALL

## 2020-01-03 ASSESSMENT — ASTHMA QUESTIONNAIRES
QUESTION_1 LAST FOUR WEEKS HOW MUCH OF THE TIME DID YOUR ASTHMA KEEP YOU FROM GETTING AS MUCH DONE AT WORK, SCHOOL OR AT HOME: A LITTLE OF THE TIME
QUESTION_3 LAST FOUR WEEKS HOW OFTEN DID YOUR ASTHMA SYMPTOMS (WHEEZING, COUGHING, SHORTNESS OF BREATH, CHEST TIGHTNESS OR PAIN) WAKE YOU UP AT NIGHT OR EARLIER THAN USUAL IN THE MORNING: NOT AT ALL
QUESTION_2 LAST FOUR WEEKS HOW OFTEN HAVE YOU HAD SHORTNESS OF BREATH: ONCE OR TWICE A WEEK
QUESTION_5 LAST FOUR WEEKS HOW WOULD YOU RATE YOUR ASTHMA CONTROL: WELL CONTROLLED
QUESTION_4 LAST FOUR WEEKS HOW OFTEN HAVE YOU USED YOUR RESCUE INHALER OR NEBULIZER MEDICATION (SUCH AS ALBUTEROL): TWO OR THREE TIMES PER WEEK
ACT_TOTALSCORE: 20

## 2020-01-03 ASSESSMENT — PATIENT HEALTH QUESTIONNAIRE - PHQ9
SUM OF ALL RESPONSES TO PHQ QUESTIONS 1-9: 1
SUM OF ALL RESPONSES TO PHQ QUESTIONS 1-9: 1
10. IF YOU CHECKED OFF ANY PROBLEMS, HOW DIFFICULT HAVE THESE PROBLEMS MADE IT FOR YOU TO DO YOUR WORK, TAKE CARE OF THINGS AT HOME, OR GET ALONG WITH OTHER PEOPLE: NOT DIFFICULT AT ALL

## 2020-01-03 NOTE — PROGRESS NOTES
Subjective     Christopher Buchanan is a 64 year old male who presents to clinic today for the following health issues:    History of Present Illness        Back Pain:  He presents for follow up of back pain. Patient's back pain is a recurring problem.  Location of back pain:  Left shoulder, left hip and other  Description of back pain: shooting  Back pain spreads: left buttocks    Since patient first noticed back pain, pain is: gradually worsening  Does back pain interfere with his job:  Not applicable      He eats 2-3 servings of fruits and vegetables daily.He consumes 0 sweetened beverage(s) daily.  He is taking medications regularly.     Patient is here for a medication check and refills.      BP Readings from Last 3 Encounters:   20 134/88   19 112/74   19 135/85    Wt Readings from Last 3 Encounters:   20 99.5 kg (219 lb 4.8 oz)   19 97.4 kg (214 lb 12.8 oz)   19 100 kg (220 lb 6.4 oz)               Past Medical History:   Diagnosis Date     ALLERGIC RHINITIS NOS 2003     CARDIOVASCULAR SCREENING; LDL GOAL LESS THAN 160 10/31/2010     Erectile dysfunction 2011     Esophageal reflux 2003     GENERAL OSTEOARTHROSIS 2003     Hernia, abdominal      Hydrocele      Hydrocele, left 3/31/2011     Low back pain 2011     Moderate persistent asthma 3/21/2011     Mumps      Prostatism      Tuberculosis        Past Surgical History:   Procedure Laterality Date     C SPLINT      right thumb     EYE SURGERY      YAG procedure       Family History   Problem Relation Age of Onset     C.A.D. Father          at 52 of heart failure       Social History     Tobacco Use     Smoking status: Never Smoker     Smokeless tobacco: Never Used   Substance Use Topics     Alcohol use: Yes     Comment: 1 beer daily            Reviewed and updated as needed this visit by Provider         Review of Systems   ROS COMP: Constitutional, HEENT, cardiovascular, pulmonary, GI, ,  musculoskeletal, neuro, skin, endocrine and psych systems are negative, except as otherwise noted.      Objective  /88 (BP Location: Right arm, Patient Position: Chair, Cuff Size: Adult Large)   Pulse 80   Temp 98.3  F (36.8  C) (Oral)   Wt 99.5 kg (219 lb 4.8 oz)   BMI 28.93 kg/m      Physical Exam   GENERAL: healthy, alert and no distress  EYES: Eyes grossly normal to inspection, PERRL and conjunctivae and sclerae normal  HENT: ear canals and TM's normal, nose and mouth without ulcers or lesions  NECK: no adenopathy, no asymmetry, masses, or scars and thyroid normal to palpation  RESP: lungs clear to auscultation - no rales, rhonchi or wheezes  CV: regular rate and rhythm, normal S1 S2, no S3 or S4, no murmur, click or rub, no peripheral edema and peripheral pulses strong  ABDOMEN: soft, nontender, no hepatosplenomegaly, no masses and bowel sounds normal  MS: no gross musculoskeletal defects noted, no edema  SKIN: no suspicious lesions or rashes  NEURO: Normal strength and tone, mentation intact and speech normal  PSYCH: mentation appears normal, affect normal/bright    Diagnostic Test Results:  Labs reviewed in Epic    (Z23) Need for shingles vaccine  (primary encounter diagnosis)  Comment:   Plan: ZOSTER VACCINE RECOMBINANT ADJUVANTED IM NJX            (M54.5) Acute midline low back pain without sciatica  Comment:   Plan: Drug  Screen Comprehensive , Urine with         Reported Meds (MedTox) (Pain Care Package),         HYDROcodone-acetaminophen (NORCO) 5-325 MG         tablet            (G89.29) Other chronic pain  Comment:   Plan: Drug  Screen Comprehensive , Urine with         Reported Meds (MedTox) (Pain Care Package)        Quarterly visit s    (N52.9) Erectile dysfunction, unspecified erectile dysfunction type  Comment:   Plan: sildenafil (REVATIO) 20 MG tablet        meds effective     (K21.0) Gastroesophageal reflux disease with esophagitis  Comment:   Plan: omeprazole (PRILOSEC) 20 MG   capsule        meds effective     (J45.41) Moderate persistent asthma with allergic rhinitis without status asthmaticus with acute exacerbation  Comment:   Plan:albuterol prn episodic     (G47.9) Sleep disorder  Comment:   Plan: zolpidem (AMBIEN) 10 MG tablet        Not daily    (Z23) Need for prophylactic vaccination with tetanus-diphtheria (Td)  Comment:   Plan: TD PRESERV FREE, IM (7+ YRS)                Assessment & Plan   Current Outpatient Medications   Medication     albuterol (PROAIR HFA) 108 (90 Base) MCG/ACT inhaler     COMPOUNDED NON-CONTROLLED SUBSTANCE (CMPD RX) - PHARMACY TO MIX COMPOUNDED MEDICATION     cyclobenzaprine (FLEXERIL) 5 MG tablet     fluticasone (FLONASE) 50 MCG/ACT nasal spray     HYDROcodone-acetaminophen (NORCO) 5-325 MG tablet     omeprazole (PRILOSEC) 20 MG DR capsule     zolpidem (AMBIEN) 10 MG tablet     No current facility-administered medications for this visit.      Quarterly drug monitoring opioids, he's had an unblemished record. Utox today            Florentino Chavez MD  [facility]    Answers for HPI/ROS submitted by the patient on 1/3/2020   Chronic problems general questions HPI Form  If you checked off any problems, how difficult have these problems made it for you to do your work, take care of things at home, or get along with other people?: Not difficult at all  PHQ9 TOTAL SCORE: 1  JUDITH 7 TOTAL SCORE: 0

## 2020-01-04 ASSESSMENT — ANXIETY QUESTIONNAIRES: GAD7 TOTAL SCORE: 0

## 2020-01-04 ASSESSMENT — ASTHMA QUESTIONNAIRES: ACT_TOTALSCORE: 20

## 2020-01-04 ASSESSMENT — PATIENT HEALTH QUESTIONNAIRE - PHQ9: SUM OF ALL RESPONSES TO PHQ QUESTIONS 1-9: 1

## 2020-01-05 LAB — PAIN DRUG SCR UR W RPTD MEDS: NORMAL

## 2020-03-16 ENCOUNTER — MYC MEDICAL ADVICE (OUTPATIENT)
Dept: FAMILY MEDICINE | Facility: CLINIC | Age: 65
End: 2020-03-16

## 2020-03-16 NOTE — TELEPHONE ENCOUNTER
Patient sends Virtual [employer]pert message wondering if he should come to visit scheduled for 4/14/20, given age and asthma. Would phone visit be appropriate? Please advise.   Marina Roman, JUANN, RN, PHN

## 2020-03-16 NOTE — TELEPHONE ENCOUNTER
Its too early to tell where we'll be then but if we're still on social distance then we'll do the phone visit OR we can defer the April visit into June .

## 2020-04-15 ENCOUNTER — VIRTUAL VISIT (OUTPATIENT)
Dept: FAMILY MEDICINE | Facility: CLINIC | Age: 65
End: 2020-04-15
Payer: COMMERCIAL

## 2020-04-15 DIAGNOSIS — L65.9 ALOPECIA: Primary | ICD-10-CM

## 2020-04-15 PROCEDURE — 99213 OFFICE O/P EST LOW 20 MIN: CPT | Mod: 95 | Performed by: FAMILY MEDICINE

## 2020-04-15 RX ORDER — FINASTERIDE 1 MG/1
1 TABLET, FILM COATED ORAL DAILY
Qty: 90 TABLET | Refills: 1 | Status: SHIPPED | OUTPATIENT
Start: 2020-04-15 | End: 2020-07-03

## 2020-04-15 NOTE — PROGRESS NOTES
"Christopher Buchanan is a 64 year old male who is being evaluated via a billable telephone visit.      The patient has been notified of following:     \"This telephone visit will be conducted via a call between you and your physician/provider. We have found that certain health care needs can be provided without the need for a physical exam.  This service lets us provide the care you need with a short phone conversation.  If a prescription is necessary we can send it directly to your pharmacy.  If lab work is needed we can place an order for that and you can then stop by our lab to have the test done at a later time.    Telephone visits are billed at different rates depending on your insurance coverage. During this emergency period, for some insurers they may be billed the same as an in-person visit.  Please reach out to your insurance provider with any questions.    If during the course of the call the physician/provider feels a telephone visit is not appropriate, you will not be charged for this service.\"    Patient has given verbal consent for Telephone visit?  Yes    How would you like to obtain your AVS? Annabelt    Linette     Christopher Buchanan is a 64 year old male who presents to clinic today for the following health issues:    Medication Followup of Vicodin     Taking Medication as prescribed: yes    Side Effects:  None    Medication Helping Symptoms:  yes     Past Medical History:   Diagnosis Date     ALLERGIC RHINITIS NOS 5/8/2003     CARDIOVASCULAR SCREENING; LDL GOAL LESS THAN 160 10/31/2010     Erectile dysfunction 9/30/2011     Esophageal reflux 5/8/2003     GENERAL OSTEOARTHROSIS 5/8/2003     Hernia, abdominal      Hydrocele      Hydrocele, left 3/31/2011     Low back pain 9/30/2011     Moderate persistent asthma 3/21/2011     Mumps      Prostatism      Tuberculosis        Past Surgical History:   Procedure Laterality Date     C SPLINT      right thumb     EYE SURGERY      YAG procedure       Family History "   Problem Relation Age of Onset     OSMIN Father          at 52 of heart failure       Social History     Tobacco Use     Smoking status: Never Smoker     Smokeless tobacco: Never Used   Substance Use Topics     Alcohol use: Yes     Comment: 1 beer daily              BP Readings from Last 3 Encounters:   20 134/88   19 112/74   19 135/85    Wt Readings from Last 3 Encounters:   20 99.5 kg (219 lb 4.8 oz)   19 97.4 kg (214 lb 12.8 oz)   19 100 kg (220 lb 6.4 oz)                    Reviewed and updated as needed this visit by Provider         Review of Systems   ROS COMP: Constitutional, HEENT, cardiovascular, pulmonary, gi and gu systems are negative, except as otherwise noted.       Objective   Reported vitals:  There were no vitals taken for this visit.   healthy, alert and no distress  PSYCH: Alert and oriented times 3; coherent speech, normal   rate and volume, able to articulate logical thoughts, able   to abstract reason, no tangential thoughts, no hallucinations   or delusions  His affect is normal and socially distanced  RESP: No cough, no audible wheezing, able to talk in full sentences  Remainder of exam unable to be completed due to telephone visits    Diagnostic Test Results:  Labs reviewed in Epic        Assessment/Plan:  (L65.9) Alopecia  (primary encounter diagnosis)  Comment:   Plan: finasteride (PROPECIA) 1 MG tablet            ASthma: discussed Covid and inhalers    Lumbar ddd: discussed zolpidem and hydrocodone          Phone call duration:  21 minutes    Florentino Chavez MD

## 2020-05-28 ENCOUNTER — MYC REFILL (OUTPATIENT)
Dept: FAMILY MEDICINE | Facility: CLINIC | Age: 65
End: 2020-05-28

## 2020-05-28 DIAGNOSIS — J45.41: ICD-10-CM

## 2020-05-28 DIAGNOSIS — G47.9 SLEEP DISORDER: ICD-10-CM

## 2020-05-28 DIAGNOSIS — M54.40 LUMBAGO OF LUMBAR REGION WITH SCIATICA: ICD-10-CM

## 2020-05-28 RX ORDER — ZOLPIDEM TARTRATE 10 MG/1
10 TABLET ORAL
Qty: 30 TABLET | Refills: 0 | Status: SHIPPED | OUTPATIENT
Start: 2020-05-28 | End: 2020-07-03

## 2020-05-28 RX ORDER — HYDROCODONE BITARTRATE AND ACETAMINOPHEN 5; 325 MG/1; MG/1
1 TABLET ORAL EVERY 6 HOURS PRN
Qty: 45 TABLET | Refills: 0 | Status: SHIPPED | OUTPATIENT
Start: 2020-05-28 | End: 2020-07-03

## 2020-05-28 NOTE — TELEPHONE ENCOUNTER
Routing refill request to provider for review/approval because:  Drug not on the [facility] refill protocol     Virginia Ferrara RN on 5/28/2020 at 11:23 AM

## 2020-06-11 RX ORDER — ALBUTEROL SULFATE 90 UG/1
1-2 AEROSOL, METERED RESPIRATORY (INHALATION) EVERY 4 HOURS PRN
Qty: 1 INHALER | Refills: 2 | Status: SHIPPED | OUTPATIENT
Start: 2020-06-11 | End: 2020-09-03

## 2020-06-11 NOTE — TELEPHONE ENCOUNTER
Resent albuterol per fax for generic substitution (JESSICA RIVERA MD OOO and cannot sign rx)  Prescription approved per [facility] Refill Protocol.  Alix Weaver RN, BSN  Message handled by CLINIC NURSE.

## 2020-07-01 NOTE — PROGRESS NOTES
Pre-Visit Planning     Future Appointments   Date Time Provider Department Center   7/3/2020 10:10 AM Florentino Chavez MD [facility]     Arrival Time for this Appointment: 10:10 AM   Appointment Notes for this encounter:   DOXIMITY ONLY NO AMWELL AT HOME      4/15/20 visit notes state come back in 3 months for routine visit and labs, but no labs listed as needed. Please address. Spoke to patient on the phone. ACT completed, score of 16, please address.     Questionnaires Reviewed/Assigned  No additional questionnaires are needed        Patient preferred phone number: [phone]    Sent [employer] message   CARYN Reynolds, RN, PHN        Spoke to patient via phone. Are there any additional questions or concerns you'd like to review with your provider during your visit? Yes: upper back pain around where he had spinal fusion surgery, he states Dr. Simmons sent orders for x-ray to clinic, but not seeing.     Patient does have additional questions or concerns. Adjusted Appointment Notes.       Visit is not preventive.    Meds  Is there anything on your medication list that needs to be updated? No    Current Outpatient Medications   Medication     albuterol (PROAIR HFA) 108 (90 Base) MCG/ACT inhaler     cyclobenzaprine (FLEXERIL) 5 MG tablet     finasteride (PROPECIA) 1 MG tablet     fluticasone (FLONASE) 50 MCG/ACT nasal spray     HYDROcodone-acetaminophen (NORCO) 5-325 MG tablet     omeprazole (PRILOSEC) 20 MG DR capsule     sildenafil (REVATIO) 20 MG tablet     zolpidem (AMBIEN) 10 MG tablet     No current facility-administered medications for this visit.          Do you need refills on any of your medications? No    Health Maintenance Due   Topic Date Due     ANNUAL REVIEW OF HM ORDERS  1955     ADVANCE CARE PLANNING  1955     AORTIC ANEURYSM SCREENING (SYSTEM ASSIGNED)  09/12/2018     ASTHMA ACTION PLAN  12/12/2019     ZOSTER IMMUNIZATION (2 of 2) 02/28/2020     ASTHMA CONTROL TEST  07/03/2020  "    Patient is due for:  aortic aneurysm, ACT, shingrix  Patient does not need aortic aneurysm screen, never smoker, no family hx    MyChart  Patient is active on MyChart.    Questionnaire Review   Video visit    Call Summary  \"Thank you for your time today.  If anything comes up before your appointment, please feel free to contact us at [phone].\"    JUAN ReynoldsN, RN, PHN        "

## 2020-07-03 ENCOUNTER — TELEPHONE (OUTPATIENT)
Dept: FAMILY MEDICINE | Facility: CLINIC | Age: 65
End: 2020-07-03

## 2020-07-03 ENCOUNTER — VIRTUAL VISIT (OUTPATIENT)
Dept: FAMILY MEDICINE | Facility: CLINIC | Age: 65
End: 2020-07-03
Payer: COMMERCIAL

## 2020-07-03 ENCOUNTER — MYC MEDICAL ADVICE (OUTPATIENT)
Dept: FAMILY MEDICINE | Facility: CLINIC | Age: 65
End: 2020-07-03

## 2020-07-03 DIAGNOSIS — M54.40 LUMBAGO OF LUMBAR REGION WITH SCIATICA: ICD-10-CM

## 2020-07-03 DIAGNOSIS — N52.9 ERECTILE DYSFUNCTION, UNSPECIFIED ERECTILE DYSFUNCTION TYPE: Primary | ICD-10-CM

## 2020-07-03 DIAGNOSIS — Z13.6 SCREENING FOR AAA (ABDOMINAL AORTIC ANEURYSM): ICD-10-CM

## 2020-07-03 DIAGNOSIS — Z23 ENCOUNTER FOR IMMUNIZATION: Primary | ICD-10-CM

## 2020-07-03 DIAGNOSIS — G47.9 SLEEP DISORDER: ICD-10-CM

## 2020-07-03 DIAGNOSIS — L65.9 ALOPECIA: ICD-10-CM

## 2020-07-03 DIAGNOSIS — N52.8 OTHER MALE ERECTILE DYSFUNCTION: ICD-10-CM

## 2020-07-03 PROCEDURE — 99214 OFFICE O/P EST MOD 30 MIN: CPT | Mod: 95 | Performed by: FAMILY MEDICINE

## 2020-07-03 RX ORDER — ZOLPIDEM TARTRATE 10 MG/1
10 TABLET ORAL
Qty: 30 TABLET | Refills: 0 | Status: SHIPPED | OUTPATIENT
Start: 2020-07-03 | End: 2020-09-03

## 2020-07-03 RX ORDER — FINASTERIDE 1 MG/1
1 TABLET, FILM COATED ORAL DAILY
Qty: 90 TABLET | Refills: 3 | Status: SHIPPED | OUTPATIENT
Start: 2020-07-03 | End: 2021-08-09

## 2020-07-03 RX ORDER — VARDENAFIL HYDROCHLORIDE 20 MG/1
20 TABLET ORAL DAILY PRN
Qty: 20 TABLET | Refills: 1 | Status: SHIPPED | OUTPATIENT
Start: 2020-07-03 | End: 2021-01-12

## 2020-07-03 RX ORDER — FINASTERIDE 1 MG/1
1 TABLET, FILM COATED ORAL DAILY
Qty: 90 TABLET | Refills: 1 | Status: SHIPPED | OUTPATIENT
Start: 2020-07-03 | End: 2021-01-12

## 2020-07-03 RX ORDER — HYDROCODONE BITARTRATE AND ACETAMINOPHEN 5; 325 MG/1; MG/1
1 TABLET ORAL EVERY 6 HOURS PRN
Qty: 45 TABLET | Refills: 0 | Status: SHIPPED | OUTPATIENT
Start: 2020-07-03 | End: 2020-09-03

## 2020-07-03 ASSESSMENT — ASTHMA QUESTIONNAIRES
QUESTION_4 LAST FOUR WEEKS HOW OFTEN HAVE YOU USED YOUR RESCUE INHALER OR NEBULIZER MEDICATION (SUCH AS ALBUTEROL): THREE OR MORE TIMES PER DAY
QUESTION_3 LAST FOUR WEEKS HOW OFTEN DID YOUR ASTHMA SYMPTOMS (WHEEZING, COUGHING, SHORTNESS OF BREATH, CHEST TIGHTNESS OR PAIN) WAKE YOU UP AT NIGHT OR EARLIER THAN USUAL IN THE MORNING: NOT AT ALL
ACT_TOTALSCORE: 16
QUESTION_1 LAST FOUR WEEKS HOW MUCH OF THE TIME DID YOUR ASTHMA KEEP YOU FROM GETTING AS MUCH DONE AT WORK, SCHOOL OR AT HOME: A LITTLE OF THE TIME
QUESTION_2 LAST FOUR WEEKS HOW OFTEN HAVE YOU HAD SHORTNESS OF BREATH: THREE TO SIX TIMES A WEEK
QUESTION_5 LAST FOUR WEEKS HOW WOULD YOU RATE YOUR ASTHMA CONTROL: SOMEWHAT CONTROLLED

## 2020-07-03 NOTE — TELEPHONE ENCOUNTER
Светлана, Appears this was panel management. Please enter ACT score and route to RN if less than 20.   Marina Roman, BSN, RN, PHN

## 2020-07-03 NOTE — PROGRESS NOTES
"Christopher Buchanan is a 64 year old male who is being evaluated via a billable video visit.      The patient has been notified of following:     \"This video visit will be conducted via a call between you and your physician/provider. We have found that certain health care needs can be provided without the need for an in-person physical exam.  This service lets us provide the care you need with a video conversation.  If a prescription is necessary we can send it directly to your pharmacy.  If lab work is needed we can place an order for that and you can then stop by our lab to have the test done at a later time.    Video visits are billed at different rates depending on your insurance coverage.  Please reach out to your insurance provider with any questions.    If during the course of the call the physician/provider feels a video visit is not appropriate, you will not be charged for this service.\"    Patient has given verbal consent for Video visit? Yes  How would you like to obtain your AVS? [facility]  Patient would like the video invitation sent by: Text to cell phone: [phone]  Will anyone else be joining your video visit? No      Subjective     Christopher Buchanan is a 64 year old male who presents today via video visit for the following health issues:    History of Present Illness        Back Pain:  He presents for follow up of back pain. Patient's back pain is a new problem.    Original cause of back pain: lifting  First noticed back pain: more than 1 month ago  Patient feels back pain: constantlyLocation of back pain:  Other  Description of back pain: gnawing  Back pain spreads: left shoulder    Since patient first noticed back pain, pain is: unchanged  Does back pain interfere with his job:  Yes  On a scale of 1-10 (10 being the worst), patient describes pain as:  6  What makes back pain worse: other  Acupuncture: not tried  Acetaminophen: helpful  Activity or exercise: not helpful  Chiropractor:  Not tried  Cold: not " tried  Heat: not tried  Massage: not tried  Muscle relaxants: helpful  NSAIDS: helpful  Opioids: helpful  Physical Therapy: not tried  Rest: helpful  Steroid Injection: not tried  Stretching: not tried  Surgery: not tried  TENS unit: not tried  Topical pain relievers: not tried  Other healthcare providers patient is seeing for back pain: None    He eats 2-3 servings of fruits and vegetables daily.He consumes 0 sweetened beverage(s) daily.He exercises with enough effort to increase his heart rate 20 to 29 minutes per day.  He exercises with enough effort to increase his heart rate 4 days per week.   He is taking medications regularly.    History of chronic back pain, sleep disturbance and ed issues   Past Medical History:   Diagnosis Date     ALLERGIC RHINITIS NOS 2003     CARDIOVASCULAR SCREENING; LDL GOAL LESS THAN 160 10/31/2010     Erectile dysfunction 2011     Esophageal reflux 2003     GENERAL OSTEOARTHROSIS 2003     Hernia, abdominal      Hydrocele      Hydrocele, left 3/31/2011     Low back pain 2011     Moderate persistent asthma 3/21/2011     Mumps      Prostatism      Tuberculosis        Past Surgical History:   Procedure Laterality Date     C SPLINT      right thumb     EYE SURGERY      YAG procedure       Family History   Problem Relation Age of Onset     C.A.D. Father          at 52 of heart failure       Social History     Tobacco Use     Smoking status: Never Smoker     Smokeless tobacco: Never Used   Substance Use Topics     Alcohol use: Yes     Comment: 1 beer daily            Video Start Time:     Hurt his neck lifting a tv out of the house and into the truck, about 40 pounds, has had prior neck surgery, doing well with weight, exercise and the covid confinement    BP Readings from Last 3 Encounters:   20 134/88   19 112/74   19 135/85    Wt Readings from Last 3 Encounters:   20 99.5 kg (219 lb 4.8 oz)   19 97.4 kg (214 lb 12.8 oz)   19  100 kg (220 lb 6.4 oz)                    Reviewed and updated as needed this visit by Provider         Review of Systems   Constitutional, HEENT, cardiovascular, pulmonary, GI, , musculoskeletal, neuro, skin, endocrine and psych systems are negative, except as otherwise noted.      Objective             Physical Exam     GENERAL: Healthy, alert and no distress  EYES: Eyes grossly normal to inspection.  No discharge or erythema, or obvious scleral/conjunctival abnormalities.  RESP: No audible wheeze, cough, or visible cyanosis.  No visible retractions or increased work of breathing.    SKIN: Visible skin clear. No significant rash, abnormal pigmentation or lesions.  NEURO: Cranial nerves grossly intact.  Mentation and speech appropriate for age.  PSYCH: Mentation appears normal, affect normal/bright, judgement and insight intact, normal speech and appearance well-groomed.      Diagnostic Test Results:  Labs reviewed in Epic        Assessment & Plan   Assessment  ASTHMA, Intermittent  Low back and neck pain     Plan  1. Screening for AAA (abdominal aortic aneurysm)  Discuss at his well exam     2. Encounter for immunization  Get his second zoster     3. Alopecia    - finasteride (PROPECIA) 1 MG tablet; Take 1 tablet (1 mg) by mouth daily  Dispense: 90 tablet; Refill: 1  - finasteride (PROPECIA) 1 MG tablet; Take 1 tablet (1 mg) by mouth daily  Dispense: 90 tablet; Refill: 3    4. Lumbago of lumbar region with sciatica  Med renew propecia  - HYDROcodone-acetaminophen (NORCO) 5-325 MG tablet; Take 1 tablet by mouth every 6 hours as needed for moderate to severe pain  Dispense: 45 tablet; Refill: 0    5. Sleep disorder  meds effective intermittent   - zolpidem (AMBIEN) 10 MG tablet; Take 1 tablet (10 mg) by mouth nightly as needed for sleep  Dispense: 30 tablet; Refill: 0    6. Other male erectile dysfunction  meds effective   - vardenafil (LEVITRA) 20 MG tablet; Take 1 tablet (20 mg) by mouth daily as needed   "Dispense: 20 tablet; Refill: 1    BMI:   Estimated body mass index is 28.93 kg/m  as calculated from the following:    Height as of 9/11/19: 1.854 m (6' 1\").    Weight as of 1/3/20: 99.5 kg (219 lb 4.8 oz).   Weight management plan: Discussed healthy diet and exercise guidelines          MEDICATIONS:  Continue current medications without change    No follow-ups on file.    Florentino Chavez MD  [facility]          Video-Visit Details    Type of service:  Video Visit contact 10: 15    Video End Time:10:40  Originating Location (pt. Location): Home    Distant Location (provider location):  [facility]     Platform used for Video Visit: Elba Chavez MD      "

## 2020-07-03 NOTE — TELEPHONE ENCOUNTER
Central Prior Authorization Team  Phone: [phone]    PA Initiation    Medication: vardenafil (LEVITRA) 20 MG tablet   Insurance Company: [employer] - Phone [phone] Fax [phone]  Pharmacy Filling the Rx: [facility] [place] [address]  Filling Pharmacy Phone: [phone]  Filling Pharmacy Fax:    Start Date: 7/3/2020

## 2020-07-03 NOTE — TELEPHONE ENCOUNTER
Added note in precharting for MD to address in virtual visit today.   Marina Roman, JUANN, RN, PHN

## 2020-07-03 NOTE — TELEPHONE ENCOUNTER
This medication has been approved, however plan will only allow 8 tablets per 30 days or 24 per 90 days. If provider would like to appeal this quantity limit, please provide a letter of medical necessity.               Prior Authorization Approval    Authorization Effective Date: 6/3/2020  Authorization Expiration Date: 7/3/2021  Medication: vardenafil (LEVITRA) 20 MG tablet- APPROVED   Approved Dose/Quantity: 8 tablets per 30 days or 24 tablets per 90 days.  Reference #:     Insurance Company: [employer] - Phone [phone] Fax [phone]  Expected CoPay:       CoPay Card Available:      Foundation Assistance Needed:    Which Pharmacy is filling the prescription (Not needed for infusion/clinic administered): [facility] - [address]  Pharmacy Notified: Yes  Patient Notified: Comment:  **Instructed pharmacy to notify patient when script is ready to /ship.**

## 2020-07-03 NOTE — TELEPHONE ENCOUNTER
Prior Authorization Retail Medication Request    Medication/Dose: vardenafil (LEVITRA) 20 MG tablet   ICD code (if different than what is on RX):  Other male erectile dysfunction (N52.8)   Previously Tried and Failed:    Rationale:      Insurance Name:  [employer]   Insurance ID:  [id number]      Pharmacy Information (if different than what is on RX)  Name:  [facility] [place] [address]   Phone: [phone]

## 2020-07-06 ENCOUNTER — TELEPHONE (OUTPATIENT)
Dept: FAMILY MEDICINE | Facility: CLINIC | Age: 65
End: 2020-07-06

## 2020-07-06 NOTE — TELEPHONE ENCOUNTER
Prior Authorization Not Needed per Insurance    Medication: zolpidem  Insurance Company: [employer] - Phone [phone] Fax [phone]  Expected CoPay:      Pharmacy Filling the Rx: [id number] - [place], [place] - [address] [place] [address] AT [address]  Pharmacy Notified: Yes  Patient Notified: Yes    Called patient's insurance and no PA is needed. Instructed pharmacy to contact help desk with further processing issues.

## 2020-07-06 NOTE — TELEPHONE ENCOUNTER
Routed to PA pool, please start PA for Zolpidem 10 mg  Alix Weaver RN, BSN  Message handled by CLINIC NURSE.

## 2020-07-06 NOTE — TELEPHONE ENCOUNTER
Fax from [facility]    Pharmacy Benefit Information:    Provider: JESSICA RIVERA MD   PA STARTED VIA CMM: KEY CODE: [id number]  Medication/SIG: Zolpidem 10 mg  [facility]    Routed to Jessica Rivera MD , please advise RX CHANGE OR PA, ROUTE BACK FOR PROCESSING    Alix Weaver RN, BSN  Message handled by CLINIC NURSE.

## 2020-07-07 ENCOUNTER — TELEPHONE (OUTPATIENT)
Dept: FAMILY MEDICINE | Facility: CLINIC | Age: 65
End: 2020-07-07

## 2020-07-07 DIAGNOSIS — N52.9 ERECTILE DYSFUNCTION, UNSPECIFIED ERECTILE DYSFUNCTION TYPE: ICD-10-CM

## 2020-07-07 RX ORDER — TADALAFIL 5 MG/1
5 TABLET ORAL EVERY 24 HOURS
Qty: 28 TABLET | Refills: 0 | Status: SHIPPED | OUTPATIENT
Start: 2020-07-07 | End: 2021-01-12 | Stop reason: DRUGHIGH

## 2020-07-07 RX ORDER — TADALAFIL 5 MG/1
5 TABLET ORAL EVERY 24 HOURS
Qty: 28 TABLET | Refills: 0 | Status: SHIPPED | OUTPATIENT
Start: 2020-07-07 | End: 2020-07-07

## 2020-07-07 NOTE — TELEPHONE ENCOUNTER
Prior Authorization Not Needed per Insurance    Medication: tadalafil (CIALIS) 5 MG tablet   Insurance Company: [employer] - Phone [phone] Fax [phone]  Expected CoPay:      Pharmacy Filling the Rx: [place], [place] - [address] [facility]  Pharmacy Notified: No  Patient Notified: No    Patient's insurance allows 8 tablets per 30 days. Not sure where patient is filling this medication- [facility] (pharmacy listed on Rx) does not have Rx.

## 2020-07-07 NOTE — TELEPHONE ENCOUNTER
Prior Authorization Retail Medication Request    Medication/Dose: tadalafil (CIALIS) 5 MG tablet   ICD code (if different than what is on RX):  Erectile dysfunction, unspecified erectile dysfunction type (N52.9)   Previously Tried and Failed:    Rationale:      Insurance Name: [employer]   Insurance ID:  [id number]      Pharmacy Information (if different than what is on RX)  Name:  [facility] [address] [address]   Phone: [phone]

## 2020-07-07 NOTE — TELEPHONE ENCOUNTER
I can't figure out which pharmacy is calling. I checked with [facility] but I think it's one of the otheres.

## 2020-07-07 NOTE — TELEPHONE ENCOUNTER
Patient states he wants sent to [facility] pharmacy. Sent to pharmacy per patient request.  Marina Roman, JUANN, RN, PHN

## 2020-07-07 NOTE — TELEPHONE ENCOUNTER
Message from Pharm- This rx is still very expensive. Pt said he is looking to try Tadalafil tabs, which we do have special pricing for.  If appropriate, could we have a RX for Tadalafil?

## 2020-07-07 NOTE — TELEPHONE ENCOUNTER
PA Initiation    Medication: tadalafil (CIALIS) 5 MG tablet   Insurance Company: [employer] - Phone [phone] Fax [phone]  Pharmacy Filling the Rx: [place], [place] - [address]  Filling Pharmacy Phone: [phone]  Filling Pharmacy Fax: [phone]  Start Date: 7/7/2020

## 2020-07-08 DIAGNOSIS — N52.9 ERECTILE DYSFUNCTION, UNSPECIFIED ERECTILE DYSFUNCTION TYPE: ICD-10-CM

## 2020-07-08 DIAGNOSIS — N52.9 ERECTILE DYSFUNCTION: ICD-10-CM

## 2020-07-08 RX ORDER — TADALAFIL 5 MG/1
5 TABLET ORAL EVERY 24 HOURS
Qty: 28 TABLET | Refills: 0 | OUTPATIENT
Start: 2020-07-08

## 2020-07-08 NOTE — TELEPHONE ENCOUNTER
"Pharm states\" Pt is requesting the 20 mg Tadalafil. Please semd new rx if appropriate. Looking fro a Qty of 10 w/ refillls\"  "

## 2020-07-08 NOTE — TELEPHONE ENCOUNTER
Patient has refills remaining with requesting pharmacy.  Elizabeth YA - Registered Nurse  [facility]  Acute and Diagnostic Services

## 2020-07-13 RX ORDER — TADALAFIL 20 MG/1
TABLET ORAL
Qty: 8 TABLET | Refills: 3 | Status: CANCELLED | OUTPATIENT
Start: 2020-07-13

## 2020-07-13 NOTE — TELEPHONE ENCOUNTER
"See messages, tadalafil 5 mg sent 7/7/20, now pt asking for tadalafil 20 mg rx with refills, pt has had this years ago, reordered same rx, routed to covering provider as Florentino Chavez MD out of office    \"Medication: vardenafil (LEVITRA) 20 MG tablet- APPROVED   Approved Dose/Quantity: 8 tablets per 30 days or 24 tablets per 90 days.  6. Other male erectile dysfunction  meds effective   - vardenafil (LEVITRA) 20 MG tablet; Take 1 tablet (20 mg) by mouth daily as needed  Dispense: 20 tablet; Refill: 1\"    Alix Weaver, RN, BSN  Message handled by CLINIC NURSE.    "

## 2020-07-13 NOTE — TELEPHONE ENCOUNTER
Discussed with NWD, prefers to have pt wait for NWD, called pt, may disregard for now, will  rx sent for 5 mg, will f/u with Florentino Chavez MD when next refill due  Alix Weaver RN, BSN  Message handled by CLINIC NURSE.

## 2020-09-03 ENCOUNTER — MYC REFILL (OUTPATIENT)
Dept: FAMILY MEDICINE | Facility: CLINIC | Age: 65
End: 2020-09-03

## 2020-09-03 DIAGNOSIS — G47.9 SLEEP DISORDER: ICD-10-CM

## 2020-09-03 DIAGNOSIS — J45.41: ICD-10-CM

## 2020-09-03 DIAGNOSIS — M54.40 LUMBAGO OF LUMBAR REGION WITH SCIATICA: ICD-10-CM

## 2020-09-03 NOTE — TELEPHONE ENCOUNTER
Routing refill request to provider for review/approval because:  Drug not on the [facility] refill protocol   A break in medication-flonase    NO follow up appointment scheduled  Alix Weaver RN, BSN  Message handled by CLINIC NURSE.

## 2020-09-04 RX ORDER — ALBUTEROL SULFATE 90 UG/1
1-2 AEROSOL, METERED RESPIRATORY (INHALATION) EVERY 4 HOURS PRN
Qty: 1 INHALER | Refills: 2 | Status: SHIPPED | OUTPATIENT
Start: 2020-09-04 | End: 2021-07-14

## 2020-09-04 RX ORDER — ZOLPIDEM TARTRATE 10 MG/1
10 TABLET ORAL
Qty: 30 TABLET | Refills: 0 | Status: SHIPPED | OUTPATIENT
Start: 2020-09-04 | End: 2021-01-12

## 2020-09-04 RX ORDER — FLUTICASONE PROPIONATE 50 MCG
SPRAY, SUSPENSION (ML) NASAL
Qty: 16 ML | Refills: 3 | Status: SHIPPED | OUTPATIENT
Start: 2020-09-04 | End: 2021-07-14

## 2020-09-04 RX ORDER — HYDROCODONE BITARTRATE AND ACETAMINOPHEN 5; 325 MG/1; MG/1
1 TABLET ORAL EVERY 6 HOURS PRN
Qty: 45 TABLET | Refills: 0 | Status: SHIPPED | OUTPATIENT
Start: 2020-09-04 | End: 2021-01-12

## 2020-09-15 ENCOUNTER — MYC MEDICAL ADVICE (OUTPATIENT)
Dept: FAMILY MEDICINE | Facility: CLINIC | Age: 65
End: 2020-09-15

## 2020-09-15 NOTE — TELEPHONE ENCOUNTER
Chart review shows Dr. Chavez wants to see patient for inclinic visit around 10/3/20. Bespoke message sent as a reminder.   Marina Roman, JUANN, RN, PHN

## 2020-09-16 NOTE — TELEPHONE ENCOUNTER
"Patient replies that he is reluctant to come to clinic as last video visit cost almost $300 and money is tight; he states his blood pressure \"has always been low,\"  BP Readings from Last 6 Encounters:   01/03/20 134/88   09/11/19 112/74   03/27/19 135/85   12/12/18 142/84   09/04/18 138/88   01/08/18 118/72     Advised he should let us know when he's ready to schedule appt   Marina Roman, JUANN, RN, PHN    "

## 2020-10-29 ENCOUNTER — ALLIED HEALTH/NURSE VISIT (OUTPATIENT)
Dept: FAMILY MEDICINE | Facility: CLINIC | Age: 65
End: 2020-10-29
Payer: COMMERCIAL

## 2020-10-29 DIAGNOSIS — Z23 NEED FOR PROPHYLACTIC VACCINATION AND INOCULATION AGAINST INFLUENZA: Primary | ICD-10-CM

## 2020-10-29 PROCEDURE — 90662 IIV NO PRSV INCREASED AG IM: CPT

## 2020-10-29 PROCEDURE — 99207 PR NO CHARGE NURSE ONLY: CPT

## 2020-10-29 PROCEDURE — 90471 IMMUNIZATION ADMIN: CPT

## 2020-10-29 NOTE — NURSING NOTE
Prior to immunization administration, verified patients identity using patient s name and date of birth. Please see Immunization Activity for additional information.     Screening Questionnaire for Adult Immunization    Are you sick today?   No   Do you have allergies to medications, food, a vaccine component or latex?   No   Have you ever had a serious reaction after receiving a vaccination?   No   Do you have a long-term health problem with heart, lung, kidney, or metabolic disease (e.g., diabetes), asthma, a blood disorder, no spleen, complement component deficiency, a cochlear implant, or a spinal fluid leak?  Are you on long-term aspirin therapy?   No   Do you have cancer, leukemia, HIV/AIDS, or any other immune system problem?   No   Do you have a parent, brother, or sister with an immune system problem?   No   In the past 3 months, have you taken medications that affect  your immune system, such as prednisone, other steroids, or anticancer drugs; drugs for the treatment of rheumatoid arthritis, Crohn s disease, or psoriasis; or have you had radiation treatments?   No   Have you had a seizure, or a brain or other nervous system problem?   No   During the past year, have you received a transfusion of blood or blood    products, or been given immune (gamma) globulin or antiviral drug?   No   For women: Are you pregnant or is there a chance you could become       pregnant during the next month?   No   Have you received any vaccinations in the past 4 weeks?   No     Immunization questionnaire answers were all negative.        Per orders of Dr. Chavez, injection of high dose flu (left deltoid) given by Vannessa Sánchez CMA. Patient instructed to remain in clinic for 15 minutes afterwards, and to report any adverse reaction to me immediately.       Screening performed by Vannessa Sánchez CMA on 10/29/2020 at 7:06 AM.

## 2020-12-07 ENCOUNTER — MYC MEDICAL ADVICE (OUTPATIENT)
Dept: FAMILY MEDICINE | Facility: CLINIC | Age: 65
End: 2020-12-07

## 2020-12-07 DIAGNOSIS — Z00.00 WELL ADULT EXAM: Primary | ICD-10-CM

## 2020-12-07 DIAGNOSIS — G89.29 CHRONIC LOW BACK PAIN WITH SCIATICA, SCIATICA LATERALITY UNSPECIFIED, UNSPECIFIED BACK PAIN LATERALITY: ICD-10-CM

## 2020-12-07 DIAGNOSIS — M54.40 CHRONIC LOW BACK PAIN WITH SCIATICA, SCIATICA LATERALITY UNSPECIFIED, UNSPECIFIED BACK PAIN LATERALITY: ICD-10-CM

## 2020-12-07 DIAGNOSIS — Z12.11 SCREEN FOR COLON CANCER: ICD-10-CM

## 2020-12-07 NOTE — TELEPHONE ENCOUNTER
Contacted patient and he states wants to have an appt early in the morning to avoid too many people, rescheduled annual wellness for 02/17/2021 @262 with Dr. VIVAS---pt is also wanting Dr. VIVAS to order lab tests to have completed earlier, which includes urine tox screen    Thanks    Vannessa Sánchez/GURPREET  [place]---[facility]

## 2020-12-08 NOTE — TELEPHONE ENCOUNTER
Contacted patient and scheduled a lab only appt for tomorrow 12/09/2020    Vannessa Sáncehz/GURPREET  [place]---[facility]

## 2020-12-09 ENCOUNTER — ALLIED HEALTH/NURSE VISIT (OUTPATIENT)
Dept: FAMILY MEDICINE | Facility: CLINIC | Age: 65
End: 2020-12-09
Payer: COMMERCIAL

## 2020-12-09 DIAGNOSIS — G89.29 CHRONIC LOW BACK PAIN WITH SCIATICA, SCIATICA LATERALITY UNSPECIFIED, UNSPECIFIED BACK PAIN LATERALITY: ICD-10-CM

## 2020-12-09 DIAGNOSIS — M54.40 CHRONIC LOW BACK PAIN WITH SCIATICA, SCIATICA LATERALITY UNSPECIFIED, UNSPECIFIED BACK PAIN LATERALITY: ICD-10-CM

## 2020-12-09 DIAGNOSIS — Z00.00 WELL ADULT EXAM: ICD-10-CM

## 2020-12-09 DIAGNOSIS — Z12.11 SCREEN FOR COLON CANCER: ICD-10-CM

## 2020-12-09 DIAGNOSIS — Z12.11 SPECIAL SCREENING FOR MALIGNANT NEOPLASMS, COLON: Primary | ICD-10-CM

## 2020-12-09 LAB
ALBUMIN SERPL-MCNC: 3.8 G/DL (ref 3.4–5)
ALP SERPL-CCNC: 65 U/L (ref 40–150)
ALT SERPL W P-5'-P-CCNC: 27 U/L (ref 0–70)
ANION GAP SERPL CALCULATED.3IONS-SCNC: 4 MMOL/L (ref 3–14)
AST SERPL W P-5'-P-CCNC: 18 U/L (ref 0–45)
BILIRUB SERPL-MCNC: 0.8 MG/DL (ref 0.2–1.3)
BUN SERPL-MCNC: 12 MG/DL (ref 7–30)
CALCIUM SERPL-MCNC: 8.8 MG/DL (ref 8.5–10.1)
CHLORIDE SERPL-SCNC: 103 MMOL/L (ref 94–109)
CHOLEST SERPL-MCNC: 214 MG/DL
CO2 SERPL-SCNC: 29 MMOL/L (ref 20–32)
CREAT SERPL-MCNC: 0.87 MG/DL (ref 0.66–1.25)
GFR SERPL CREATININE-BSD FRML MDRD: 89 ML/MIN/{1.73_M2}
GLUCOSE SERPL-MCNC: 96 MG/DL (ref 70–99)
HDLC SERPL-MCNC: 57 MG/DL
LDLC SERPL CALC-MCNC: 125 MG/DL
NONHDLC SERPL-MCNC: 157 MG/DL
POTASSIUM SERPL-SCNC: 3.8 MMOL/L (ref 3.4–5.3)
PROT SERPL-MCNC: 7 G/DL (ref 6.8–8.8)
PSA SERPL-ACNC: 1.48 UG/L (ref 0–4)
SODIUM SERPL-SCNC: 136 MMOL/L (ref 133–144)
TRIGL SERPL-MCNC: 161 MG/DL

## 2020-12-09 PROCEDURE — 99000 SPECIMEN HANDLING OFFICE-LAB: CPT | Performed by: FAMILY MEDICINE

## 2020-12-09 PROCEDURE — 36415 COLL VENOUS BLD VENIPUNCTURE: CPT | Performed by: FAMILY MEDICINE

## 2020-12-09 PROCEDURE — 80307 DRUG TEST PRSMV CHEM ANLYZR: CPT | Mod: 90 | Performed by: FAMILY MEDICINE

## 2020-12-09 PROCEDURE — 99207 PR NO CHARGE NURSE ONLY: CPT

## 2020-12-09 PROCEDURE — G0103 PSA SCREENING: HCPCS | Performed by: FAMILY MEDICINE

## 2020-12-09 PROCEDURE — 80053 COMPREHEN METABOLIC PANEL: CPT | Performed by: FAMILY MEDICINE

## 2020-12-09 PROCEDURE — 80061 LIPID PANEL: CPT | Performed by: FAMILY MEDICINE

## 2020-12-10 ENCOUNTER — TELEPHONE (OUTPATIENT)
Dept: LAB | Facility: CLINIC | Age: 65
End: 2020-12-10

## 2020-12-10 NOTE — TELEPHONE ENCOUNTER
12/10/2020  Patient presented to lab for a comprehensive drug analysis.  He is currently taking a water-soluble CBD product and is concerned that this will show up on his test.  I contacted [facility] and they confirmed that it will most likely show up on his panel, as the test does not differentiate between the two.  They do provide a CBD add on test that is an extra charge.    I called the patient and left a message with this information for him.

## 2020-12-11 LAB — COMPREHEN DRUG ANALYSIS UR: NORMAL

## 2020-12-22 DIAGNOSIS — Z12.11 SPECIAL SCREENING FOR MALIGNANT NEOPLASMS, COLON: ICD-10-CM

## 2020-12-22 DIAGNOSIS — Z12.11 SCREEN FOR COLON CANCER: ICD-10-CM

## 2020-12-22 PROCEDURE — 82274 ASSAY TEST FOR BLOOD FECAL: CPT | Performed by: FAMILY MEDICINE

## 2020-12-27 LAB — HEMOCCULT STL QL IA: NEGATIVE

## 2020-12-30 ENCOUNTER — MYC MEDICAL ADVICE (OUTPATIENT)
Dept: FAMILY MEDICINE | Facility: CLINIC | Age: 65
End: 2020-12-30

## 2021-01-08 ASSESSMENT — ENCOUNTER SYMPTOMS
HEADACHES: 0
PARESTHESIAS: 0
SORE THROAT: 0
FREQUENCY: 0
FEVER: 0
ABDOMINAL PAIN: 0
DIZZINESS: 0
CONSTIPATION: 0
SHORTNESS OF BREATH: 1
WEAKNESS: 0
EYE PAIN: 0
MYALGIAS: 0
DYSURIA: 0
PALPITATIONS: 0
NAUSEA: 0
COUGH: 0
JOINT SWELLING: 0
HEMATOCHEZIA: 0
NERVOUS/ANXIOUS: 0
HEARTBURN: 0
CHILLS: 0
ARTHRALGIAS: 1
HEMATURIA: 0
DIARRHEA: 0

## 2021-01-08 ASSESSMENT — ACTIVITIES OF DAILY LIVING (ADL): CURRENT_FUNCTION: NO ASSISTANCE NEEDED

## 2021-01-12 ENCOUNTER — OFFICE VISIT (OUTPATIENT)
Dept: FAMILY MEDICINE | Facility: CLINIC | Age: 66
End: 2021-01-12
Payer: COMMERCIAL

## 2021-01-12 VITALS
OXYGEN SATURATION: 97 % | HEART RATE: 78 BPM | BODY MASS INDEX: 26.95 KG/M2 | RESPIRATION RATE: 14 BRPM | DIASTOLIC BLOOD PRESSURE: 87 MMHG | SYSTOLIC BLOOD PRESSURE: 138 MMHG | WEIGHT: 210 LBS | HEIGHT: 74 IN | TEMPERATURE: 98 F

## 2021-01-12 DIAGNOSIS — N52.9 ERECTILE DYSFUNCTION, UNSPECIFIED ERECTILE DYSFUNCTION TYPE: ICD-10-CM

## 2021-01-12 DIAGNOSIS — Z71.89 ADVANCE CARE PLANNING: Primary | ICD-10-CM

## 2021-01-12 DIAGNOSIS — L65.9 ALOPECIA: ICD-10-CM

## 2021-01-12 DIAGNOSIS — Z00.00 ENCOUNTER FOR MEDICARE ANNUAL WELLNESS EXAM: ICD-10-CM

## 2021-01-12 DIAGNOSIS — G47.9 SLEEP DISORDER: ICD-10-CM

## 2021-01-12 DIAGNOSIS — J30.89 NON-SEASONAL ALLERGIC RHINITIS, UNSPECIFIED TRIGGER: ICD-10-CM

## 2021-01-12 DIAGNOSIS — I47.10 PAROXYSMAL SUPRAVENTRICULAR TACHYCARDIA (H): ICD-10-CM

## 2021-01-12 DIAGNOSIS — M54.40 LUMBAGO OF LUMBAR REGION WITH SCIATICA: ICD-10-CM

## 2021-01-12 PROCEDURE — 99213 OFFICE O/P EST LOW 20 MIN: CPT | Mod: 25 | Performed by: FAMILY MEDICINE

## 2021-01-12 PROCEDURE — 99397 PER PM REEVAL EST PAT 65+ YR: CPT | Performed by: FAMILY MEDICINE

## 2021-01-12 RX ORDER — ZOLPIDEM TARTRATE 10 MG/1
10 TABLET ORAL
Qty: 30 TABLET | Refills: 0 | Status: SHIPPED | OUTPATIENT
Start: 2021-01-12 | End: 2021-07-14

## 2021-01-12 RX ORDER — HYDROCODONE BITARTRATE AND ACETAMINOPHEN 5; 325 MG/1; MG/1
1 TABLET ORAL EVERY 6 HOURS PRN
Qty: 45 TABLET | Refills: 0 | Status: SHIPPED | OUTPATIENT
Start: 2021-01-12 | End: 2021-07-14

## 2021-01-12 RX ORDER — TADALAFIL 5 MG/1
5 TABLET ORAL EVERY 24 HOURS
Qty: 28 TABLET | Refills: 0 | Status: SHIPPED | OUTPATIENT
Start: 2021-01-12 | End: 2021-01-12

## 2021-01-12 RX ORDER — TADALAFIL 5 MG/1
5 TABLET ORAL EVERY 24 HOURS
Qty: 60 TABLET | Refills: 0 | Status: SHIPPED | OUTPATIENT
Start: 2021-01-12 | End: 2021-07-14

## 2021-01-12 RX ORDER — FINASTERIDE 1 MG/1
1 TABLET, FILM COATED ORAL DAILY
Qty: 90 TABLET | Refills: 1 | Status: SHIPPED | OUTPATIENT
Start: 2021-01-12 | End: 2021-07-14

## 2021-01-12 ASSESSMENT — ANXIETY QUESTIONNAIRES
5. BEING SO RESTLESS THAT IT IS HARD TO SIT STILL: NOT AT ALL
3. WORRYING TOO MUCH ABOUT DIFFERENT THINGS: SEVERAL DAYS
2. NOT BEING ABLE TO STOP OR CONTROL WORRYING: NOT AT ALL
7. FEELING AFRAID AS IF SOMETHING AWFUL MIGHT HAPPEN: SEVERAL DAYS
GAD7 TOTAL SCORE: 2
6. BECOMING EASILY ANNOYED OR IRRITABLE: NOT AT ALL
GAD7 TOTAL SCORE: 2
1. FEELING NERVOUS, ANXIOUS, OR ON EDGE: NOT AT ALL
7. FEELING AFRAID AS IF SOMETHING AWFUL MIGHT HAPPEN: SEVERAL DAYS
GAD7 TOTAL SCORE: 2
4. TROUBLE RELAXING: NOT AT ALL

## 2021-01-12 ASSESSMENT — PATIENT HEALTH QUESTIONNAIRE - PHQ9
SUM OF ALL RESPONSES TO PHQ QUESTIONS 1-9: 0
SUM OF ALL RESPONSES TO PHQ QUESTIONS 1-9: 0
10. IF YOU CHECKED OFF ANY PROBLEMS, HOW DIFFICULT HAVE THESE PROBLEMS MADE IT FOR YOU TO DO YOUR WORK, TAKE CARE OF THINGS AT HOME, OR GET ALONG WITH OTHER PEOPLE: NOT DIFFICULT AT ALL

## 2021-01-12 ASSESSMENT — ENCOUNTER SYMPTOMS
PARESTHESIAS: 0
WEAKNESS: 0
SHORTNESS OF BREATH: 1
CHILLS: 0
HEADACHES: 0
CONSTIPATION: 0
HEMATOCHEZIA: 0
FREQUENCY: 0
DIARRHEA: 0
MYALGIAS: 0
SORE THROAT: 0
DIZZINESS: 0
NAUSEA: 0
JOINT SWELLING: 0
ABDOMINAL PAIN: 0
EYE PAIN: 0
HEMATURIA: 0
PALPITATIONS: 0
HEARTBURN: 0
FEVER: 0
DYSURIA: 0
ARTHRALGIAS: 1
NERVOUS/ANXIOUS: 0
COUGH: 0

## 2021-01-12 ASSESSMENT — ASTHMA QUESTIONNAIRES
QUESTION_5 LAST FOUR WEEKS HOW WOULD YOU RATE YOUR ASTHMA CONTROL: SOMEWHAT CONTROLLED
QUESTION_4 LAST FOUR WEEKS HOW OFTEN HAVE YOU USED YOUR RESCUE INHALER OR NEBULIZER MEDICATION (SUCH AS ALBUTEROL): ONE OR TWO TIMES PER DAY
ACT_TOTALSCORE: 17
QUESTION_2 LAST FOUR WEEKS HOW OFTEN HAVE YOU HAD SHORTNESS OF BREATH: THREE TO SIX TIMES A WEEK
QUESTION_1 LAST FOUR WEEKS HOW MUCH OF THE TIME DID YOUR ASTHMA KEEP YOU FROM GETTING AS MUCH DONE AT WORK, SCHOOL OR AT HOME: NONE OF THE TIME
QUESTION_3 LAST FOUR WEEKS HOW OFTEN DID YOUR ASTHMA SYMPTOMS (WHEEZING, COUGHING, SHORTNESS OF BREATH, CHEST TIGHTNESS OR PAIN) WAKE YOU UP AT NIGHT OR EARLIER THAN USUAL IN THE MORNING: ONCE OR TWICE

## 2021-01-12 ASSESSMENT — ACTIVITIES OF DAILY LIVING (ADL): CURRENT_FUNCTION: NO ASSISTANCE NEEDED

## 2021-01-12 ASSESSMENT — MIFFLIN-ST. JEOR: SCORE: 1797.3

## 2021-01-12 NOTE — PATIENT INSTRUCTIONS
Patient Education   Personalized Prevention Plan  You are due for the preventive services outlined below.  Your care team is available to assist you in scheduling these services.  If you have already completed any of these items, please share that information with your care team to update in your medical record.  Health Maintenance Due   Topic Date Due     ANNUAL REVIEW OF HM ORDERS  1955     AORTIC ANEURYSM SCREENING (SYSTEM ASSIGNED)  09/12/2018     Asthma Action Plan - yearly  12/12/2019     Asthma Control Test  07/03/2020     Annual Wellness Visit  09/11/2020     Zoster (Shingles) Vaccine (2 of 2) 02/28/2020

## 2021-01-12 NOTE — PROGRESS NOTES
"SUBJECTIVE:   Christopher Buchanan is a 65 year old male who presents for Preventive Visit.    Medicare wellness, He's a busy , very fit senior   Patient has been advised of split billing requirements and indicates understanding: Yes   Are you in the first 12 months of your Medicare coverage?      Healthy Habits:     In general, how would you rate your overall health?  Good    Frequency of exercise:  6-7 days/week    Duration of exercise:  45-60 minutes    Do you usually eat at least 4 servings of fruit and vegetables a day, include whole grains    & fiber and avoid regularly eating high fat or \"junk\" foods?  Yes    Taking medications regularly:  Yes    Medication side effects:  None    Ability to successfully perform activities of daily living:  No assistance needed    Home Safety:  No safety concerns identified    Hearing Impairment:  Difficulty understanding soft or whispered speech and no hearing concerns    In the past 6 months, have you been bothered by leaking of urine?  No    In general, how would you rate your overall mental or emotional health?  Good      PHQ-2 Total Score: 0    Additional concerns today:  No    Do you feel safe in your environment? Yes    Have you ever done Advance Care Planning? (For example, a Health Directive, POLST, or a discussion with a medical provider or your loved ones about your wishes): Yes, patient states has an Advance Care Planning document and will bring a copy to the clinic.      Fall risk  Fallen 2 or more times in the past year?: No  Any fall with injury in the past year?: No    Cognitive Screening   1) Repeat 3 items (Leader, Season, Table)    2) Clock draw: NORMAL  3) 3 item recall: Recalls 3 objects  Results: 3 items recalled: COGNITIVE IMPAIRMENT LESS LIKELY    Mini-CogTM Copyright SIMÓN Ellis. Licensed by the author for use in [facility]; reprinted with permission (chucho@.[facility]). All rights reserved.      Current Outpatient Medications   Medication     albuterol " (PROAIR HFA) 108 (90 Base) MCG/ACT inhaler     cyclobenzaprine (FLEXERIL) 5 MG tablet     finasteride (PROPECIA) 1 MG tablet     finasteride (PROPECIA) 1 MG tablet     fluticasone (FLONASE) 50 MCG/ACT nasal spray     HYDROcodone-acetaminophen (NORCO) 5-325 MG tablet     omeprazole (PRILOSEC) 20 MG DR capsule     tadalafil (CIALIS) 5 MG tablet     vardenafil (LEVITRA) 20 MG tablet     zolpidem (AMBIEN) 10 MG tablet     No current facility-administered medications for this visit.          Reviewed and updated as needed this visit by clinical staff                 Reviewed and updated as needed this visit by Provider                Social History     Tobacco Use     Smoking status: Never Smoker     Smokeless tobacco: Never Used   Substance Use Topics     Alcohol use: Yes     Comment: 1 beer daily         Alcohol Use 1/8/2021   Prescreen: >3 drinks/day or >7 drinks/week? No   Prescreen: >3 drinks/day or >7 drinks/week? -               Current providers sharing in care for this patient include:   Patient Care Team:  Florentino Chavez MD as PCP - General (Family Practice)  Florentino Chavez MD as Assigned PCP  Rigo Osborn RN as Personal Advocate & Liaison (PAL) (Family Practice)    The following health maintenance items are reviewed in Epic and correct as of today:  Health Maintenance   Topic Date Due     ANNUAL REVIEW OF HM ORDERS  1955     AORTIC ANEURYSM SCREENING (SYSTEM ASSIGNED)  09/12/2018     ASTHMA ACTION PLAN  12/12/2019     ASTHMA CONTROL TEST  07/03/2020     MEDICARE ANNUAL WELLNESS VISIT  09/11/2020     ZOSTER IMMUNIZATION (2 of 2) 02/28/2020     FALL RISK ASSESSMENT  04/15/2021     URINE DRUG SCREEN  12/09/2021     COLORECTAL CANCER SCREENING  12/22/2021     LIPID  12/09/2025     ADVANCE CARE PLANNING  01/12/2026     DTAP/TDAP/TD IMMUNIZATION (4 - Td) 01/03/2030     HEPATITIS C SCREENING  Completed     PHQ-2  Completed     INFLUENZA VACCINE  Completed     Pneumococcal Vaccine: 65+  "Years  Completed     Pneumococcal Vaccine: Pediatrics (0 to 5 Years) and At-Risk Patients (6 to 64 Years)  Aged Out     IPV IMMUNIZATION  Aged Out     MENINGITIS IMMUNIZATION  Aged Out     HEPATITIS B IMMUNIZATION  Aged Out     BP Readings from Last 3 Encounters:   01/12/21 138/87   01/03/20 134/88   09/11/19 112/74    Wt Readings from Last 3 Encounters:   01/12/21 95.3 kg (210 lb)   01/03/20 99.5 kg (219 lb 4.8 oz)   09/11/19 97.4 kg (214 lb 12.8 oz)                  Pneumonia Vaccine:Adults age 65+ who received Pneumovax (PPSV23) at 65 years or older: Should be given PCV13 > 1 year after their most recent PPSV23    Review of Systems   Constitutional: Negative for chills and fever.   HENT: Negative for congestion, ear pain, hearing loss and sore throat.    Eyes: Negative for pain and visual disturbance.   Respiratory: Positive for shortness of breath. Negative for cough.    Cardiovascular: Negative for chest pain, palpitations and peripheral edema.   Gastrointestinal: Negative for abdominal pain, constipation, diarrhea, heartburn, hematochezia and nausea.   Genitourinary: Positive for impotence. Negative for discharge, dysuria, frequency, genital sores, hematuria and urgency.   Musculoskeletal: Positive for arthralgias. Negative for joint swelling and myalgias.   Skin: Negative for rash.   Neurological: Negative for dizziness, weakness, headaches and paresthesias.   Psychiatric/Behavioral: Negative for mood changes. The patient is not nervous/anxious.      Constitutional, HEENT, cardiovascular, pulmonary, gi and gu systems are negative, except as otherwise noted.    OBJECTIVE:   There were no vitals taken for this visit. Estimated body mass index is 28.93 kg/m  as calculated from the following:    Height as of 9/11/19: 1.854 m (6' 1\").    Weight as of 1/3/20: 99.5 kg (219 lb 4.8 oz).  Physical Exam  GENERAL: healthy, alert and no distress  EYES: Eyes grossly normal to inspection, PERRL and conjunctivae and sclerae " normal  HENT: ear canals and TM's normal, nose and mouth without ulcers or lesions  NECK: no adenopathy, no asymmetry, masses, or scars and thyroid normal to palpation  RESP: lungs clear to auscultation - no rales, rhonchi or wheezes  CV: regular rate and rhythm, normal S1 S2, no S3 or S4, no murmur, click or rub, no peripheral edema and peripheral pulses strong  ABDOMEN: soft, nontender, no hepatosplenomegaly, no masses and bowel sounds normal  MS: no gross musculoskeletal defects noted, no edema  SKIN: no suspicious lesions or rashes  NEURO: Normal strength and tone, mentation intact and speech normal  PSYCH: mentation appears normal, affect normal/bright    Diagnostic Test Results:  Labs reviewed in Epic    ASSESSMENT / PLAN:   1. Advance care planning  Has been completed , rides a motorcycle, is retired     2. Encounter for Medicare annual wellness exam    (J30.89) Non-seasonal allergic rhinitis, unspecified trigger  Comment:   Plan: OFFICE/OUTPT VISIT,EST,LEVL III        meds effective     (N52.9) Erectile dysfunction, unspecified erectile dysfunction type  Comment: some reduced effectiveness, discussed dose and onset time   Plan: OFFICE/OUTPT VISIT,EST,LEVL III, tadalafil         (CIALIS) 5 MG tablet, DISCONTINUED: tadalafil         (CIALIS) 5 MG tablet            (L65.9) Alopecia  Comment:   Plan: finasteride (PROPECIA) 1 MG tablet,         OFFICE/OUTPT VISIT,EST,LEVL III        Feels it's helping him, long hair gives some breakoff     (M54.40) Lumbago of lumbar region with sciatica  Comment: limits many activities   Plan: HYDROcodone-acetaminophen (NORCO) 5-325 MG         tablet, OFFICE/OUTPT VISIT,EST,LEVL III        Spot duty combined with tylenol, infrequent usage     (G47.9) Sleep disorder  Comment:   Plan: zolpidem (AMBIEN) 10 MG tablet, OFFICE/OUTPT         VISIT,EST,LEVL III        Longstanding issues, back pain complicates     Patient has been advised of split billing requirements and indicates  "understanding: Yes  COUNSELING:  Reviewed preventive health counseling, as reflected in patient instructions       Regular exercise       Healthy diet/nutrition    Estimated body mass index is 28.93 kg/m  as calculated from the following:    Height as of 9/11/19: 1.854 m (6' 1\").    Weight as of 1/3/20: 99.5 kg (219 lb 4.8 oz).    Weight management plan: Discussed healthy diet and exercise guidelines    He reports that he has never smoked. He has never used smokeless tobacco.      Appropriate preventive services were discussed with this patient, including applicable screening as appropriate for cardiovascular disease, diabetes, osteopenia/osteoporosis, and glaucoma.  As appropriate for age/gender, discussed screening for colorectal cancer, prostate cancer, breast cancer, and cervical cancer. Checklist reviewing preventive services available has been given to the patient.    Reviewed patients plan of care and provided an AVS. The Basic Care Plan (routine screening as documented in Health Maintenance) for Christopher meets the Care Plan requirement. This Care Plan has been established and reviewed with the Patient.    Counseling Resources:  ATP IV Guidelines  Pooled Cohorts Equation Calculator  Breast Cancer Risk Calculator  Breast Cancer: Medication to Reduce Risk  FRAX Risk Assessment  ICSI Preventive Guidelines  Dietary Guidelines for Americans, 2010  [employer]'s MyPlate  ASA Prophylaxis  Lung CA Screening    Florentino Chavez MD  [facility]    Identified Health Risks:  "

## 2021-01-13 ASSESSMENT — ASTHMA QUESTIONNAIRES: ACT_TOTALSCORE: 17

## 2021-01-13 ASSESSMENT — ANXIETY QUESTIONNAIRES: GAD7 TOTAL SCORE: 2

## 2021-01-13 ASSESSMENT — PATIENT HEALTH QUESTIONNAIRE - PHQ9: SUM OF ALL RESPONSES TO PHQ QUESTIONS 1-9: 0

## 2021-03-12 ENCOUNTER — MYC MEDICAL ADVICE (OUTPATIENT)
Dept: FAMILY MEDICINE | Facility: CLINIC | Age: 66
End: 2021-03-12

## 2021-07-07 NOTE — PROGRESS NOTES
"Pre-Visit Planning   Next 5 appointments (look out 90 days)    Jul 14, 2021 11:00 AM  (Arrive by 10:45 AM)  Office Visit with Florentino Chavez MD  [facility] ([facility] [place] [facility] ) [address] [place] [address] [phone]        Appointment Notes for this encounter:   f/u meds, anxiety    Questionnaires Reviewed/Assigned  Additional questionnaires assigned        Patient preferred phone number: [phone]      Spoke to patient via phone. Are there any additional questions or concerns you'd like to review with your provider during your visit? No    Patient does not have additional questions or concerns.        Visit is not preventive.    Meds  Is there anything on your medication list that needs to be updated? No    Current Outpatient Medications   Medication     albuterol (PROAIR HFA) 108 (90 Base) MCG/ACT inhaler     finasteride (PROPECIA) 1 MG tablet     fluticasone (FLONASE) 50 MCG/ACT nasal spray     HYDROcodone-acetaminophen (NORCO) 5-325 MG tablet     omeprazole (PRILOSEC) 20 MG DR capsule     tadalafil (CIALIS) 5 MG tablet     zolpidem (AMBIEN) 10 MG tablet     cyclobenzaprine (FLEXERIL) 5 MG tablet     finasteride (PROPECIA) 1 MG tablet     No current facility-administered medications for this visit.     Which pharmacy do you prefer to use for medications during this visit if needed? Pharmacy change     Do you need refills on any of your medications? Yes:     Health Maintenance Due   Topic Date Due     AORTIC ANEURYSM SCREENING (SYSTEM ASSIGNED)  Never done     ASTHMA CONTROL TEST  07/12/2021     Patient is due for:    CSA renewal    MyChart  Patient is active on combionichart.    Questionnaire Review       Call Summary  \"Thank you for your time today.  If anything comes up before your appointment, please feel free to contact us at [phone].\"    Answers for HPI/ROS submitted by the patient on 7/13/2021  If you checked off any problems, " how difficult have these problems made it for you to do your work, take care of things at home, or get along with other people?: Not difficult at all  PHQ9 TOTAL SCORE: 1  JUDITH 7 TOTAL SCORE: 0  How many servings of fruits and vegetables do you eat daily?: 2-3  On average, how many sweetened beverages do you drink each day (Examples: soda, juice, sweet tea, etc.  Do NOT count diet or artificially sweetened beverages)?: 0  How many minutes a day do you exercise enough to make your heart beat faster?: 30 to 60  How many days a week do you exercise enough to make your heart beat faster?: 6  How many days per week do you miss taking your medication?: 0

## 2021-07-13 ASSESSMENT — ANXIETY QUESTIONNAIRES
6. BECOMING EASILY ANNOYED OR IRRITABLE: NOT AT ALL
GAD7 TOTAL SCORE: 0
3. WORRYING TOO MUCH ABOUT DIFFERENT THINGS: NOT AT ALL
1. FEELING NERVOUS, ANXIOUS, OR ON EDGE: NOT AT ALL
2. NOT BEING ABLE TO STOP OR CONTROL WORRYING: NOT AT ALL
5. BEING SO RESTLESS THAT IT IS HARD TO SIT STILL: NOT AT ALL
7. FEELING AFRAID AS IF SOMETHING AWFUL MIGHT HAPPEN: NOT AT ALL
GAD7 TOTAL SCORE: 0
4. TROUBLE RELAXING: NOT AT ALL
GAD7 TOTAL SCORE: 0
7. FEELING AFRAID AS IF SOMETHING AWFUL MIGHT HAPPEN: NOT AT ALL
8. IF YOU CHECKED OFF ANY PROBLEMS, HOW DIFFICULT HAVE THESE MADE IT FOR YOU TO DO YOUR WORK, TAKE CARE OF THINGS AT HOME, OR GET ALONG WITH OTHER PEOPLE?: NOT DIFFICULT AT ALL

## 2021-07-14 ENCOUNTER — OFFICE VISIT (OUTPATIENT)
Dept: FAMILY MEDICINE | Facility: CLINIC | Age: 66
End: 2021-07-14
Payer: COMMERCIAL

## 2021-07-14 VITALS
SYSTOLIC BLOOD PRESSURE: 136 MMHG | HEIGHT: 74 IN | DIASTOLIC BLOOD PRESSURE: 75 MMHG | WEIGHT: 203 LBS | OXYGEN SATURATION: 97 % | TEMPERATURE: 98.4 F | BODY MASS INDEX: 26.05 KG/M2 | HEART RATE: 85 BPM

## 2021-07-14 DIAGNOSIS — M54.40 LUMBAGO OF LUMBAR REGION WITH SCIATICA: ICD-10-CM

## 2021-07-14 DIAGNOSIS — Z23 NEED FOR VACCINATION: ICD-10-CM

## 2021-07-14 DIAGNOSIS — N52.9 ERECTILE DYSFUNCTION, UNSPECIFIED ERECTILE DYSFUNCTION TYPE: ICD-10-CM

## 2021-07-14 DIAGNOSIS — Z13.6 SCREENING FOR AAA (ABDOMINAL AORTIC ANEURYSM): Primary | ICD-10-CM

## 2021-07-14 DIAGNOSIS — L65.9 ALOPECIA: ICD-10-CM

## 2021-07-14 DIAGNOSIS — J45.41: ICD-10-CM

## 2021-07-14 DIAGNOSIS — K21.00 GASTROESOPHAGEAL REFLUX DISEASE WITH ESOPHAGITIS WITHOUT HEMORRHAGE: ICD-10-CM

## 2021-07-14 DIAGNOSIS — G47.9 SLEEP DISORDER: ICD-10-CM

## 2021-07-14 LAB
AMPHETAMINES UR QL: NOT DETECTED
BARBITURATES UR QL SCN: NOT DETECTED
BENZODIAZ UR QL SCN: NOT DETECTED
BUPRENORPHINE UR QL: NOT DETECTED
CANNABINOIDS UR QL: DETECTED
COCAINE UR QL SCN: NOT DETECTED
D-METHAMPHET UR QL: NOT DETECTED
METHADONE UR QL SCN: NOT DETECTED
OPIATES UR QL SCN: NOT DETECTED
OXYCODONE UR QL SCN: NOT DETECTED
PCP UR QL SCN: NOT DETECTED
PROPOXYPH UR QL: NOT DETECTED
TRICYCLICS UR QL SCN: NOT DETECTED

## 2021-07-14 PROCEDURE — 99214 OFFICE O/P EST MOD 30 MIN: CPT | Mod: 25 | Performed by: FAMILY MEDICINE

## 2021-07-14 PROCEDURE — 80306 DRUG TEST PRSMV INSTRMNT: CPT | Performed by: FAMILY MEDICINE

## 2021-07-14 PROCEDURE — 90750 HZV VACC RECOMBINANT IM: CPT | Performed by: FAMILY MEDICINE

## 2021-07-14 PROCEDURE — 90471 IMMUNIZATION ADMIN: CPT | Performed by: FAMILY MEDICINE

## 2021-07-14 RX ORDER — FLUTICASONE PROPIONATE 50 MCG
SPRAY, SUSPENSION (ML) NASAL
Qty: 16 ML | Refills: 3 | Status: SHIPPED | OUTPATIENT
Start: 2021-07-14 | End: 2022-06-01

## 2021-07-14 RX ORDER — ALBUTEROL SULFATE 90 UG/1
1-2 AEROSOL, METERED RESPIRATORY (INHALATION) EVERY 4 HOURS PRN
Qty: 18 G | Refills: 1 | Status: SHIPPED | OUTPATIENT
Start: 2021-07-14 | End: 2021-12-09

## 2021-07-14 RX ORDER — TADALAFIL 5 MG/1
5 TABLET ORAL EVERY 24 HOURS
Qty: 60 TABLET | Refills: 0 | Status: SHIPPED | OUTPATIENT
Start: 2021-07-14 | End: 2022-01-13

## 2021-07-14 RX ORDER — ZOLPIDEM TARTRATE 10 MG/1
10 TABLET ORAL
Qty: 30 TABLET | Refills: 0 | Status: CANCELLED | OUTPATIENT
Start: 2021-07-14

## 2021-07-14 RX ORDER — FINASTERIDE 1 MG/1
1 TABLET, FILM COATED ORAL DAILY
Qty: 90 TABLET | Refills: 1 | Status: SHIPPED | OUTPATIENT
Start: 2021-07-14 | End: 2022-01-13

## 2021-07-14 RX ORDER — ZOLPIDEM TARTRATE 10 MG/1
10 TABLET ORAL
Qty: 30 TABLET | Refills: 0 | Status: SHIPPED | OUTPATIENT
Start: 2021-07-14 | End: 2021-12-09

## 2021-07-14 RX ORDER — HYDROCODONE BITARTRATE AND ACETAMINOPHEN 5; 325 MG/1; MG/1
1 TABLET ORAL EVERY 6 HOURS PRN
Qty: 45 TABLET | Refills: 0 | Status: SHIPPED | OUTPATIENT
Start: 2021-07-14 | End: 2021-12-09

## 2021-07-14 RX ORDER — HYDROCODONE BITARTRATE AND ACETAMINOPHEN 5; 325 MG/1; MG/1
1 TABLET ORAL EVERY 6 HOURS PRN
Qty: 45 TABLET | Refills: 0 | Status: CANCELLED | OUTPATIENT
Start: 2021-07-14

## 2021-07-14 ASSESSMENT — ANXIETY QUESTIONNAIRES: GAD7 TOTAL SCORE: 0

## 2021-07-14 ASSESSMENT — MIFFLIN-ST. JEOR: SCORE: 1765.55

## 2021-07-14 ASSESSMENT — PATIENT HEALTH QUESTIONNAIRE - PHQ9: SUM OF ALL RESPONSES TO PHQ QUESTIONS 1-9: 1

## 2021-07-14 NOTE — PROGRESS NOTES
"    Assessment & Plan   Problem List Items Addressed This Visit     Erectile dysfunction    Relevant Medications    tadalafil (CIALIS) 5 MG tablet    Esophageal reflux    Relevant Medications    omeprazole (PRILOSEC) 20 MG DR capsule      Other Visit Diagnoses     Screening for AAA (abdominal aortic aneurysm)    -  Primary    Moderate persistent asthma with allergic rhinitis without status asthmaticus with acute exacerbation        Relevant Medications    fluticasone (FLONASE) 50 MCG/ACT nasal spray    albuterol (PROAIR HFA) 108 (90 Base) MCG/ACT inhaler    Other Relevant Orders    Asthma Action Plan (AAP) (Completed)    Need for vaccination        Relevant Orders    ZOSTER VACCINE RECOMBINANT ADJUVANTED IM NJX (Completed)    Alopecia        Relevant Medications    finasteride (PROPECIA) 1 MG tablet    fluticasone (FLONASE) 50 MCG/ACT nasal spray    albuterol (PROAIR HFA) 108 (90 Base) MCG/ACT inhaler    Sleep disorder        Relevant Medications    zolpidem (AMBIEN) 10 MG tablet    Lumbago of lumbar region with sciatica        Relevant Medications    zolpidem (AMBIEN) 10 MG tablet    HYDROcodone-acetaminophen (NORCO) 5-325 MG tablet    Other Relevant Orders    Drug Abuse Screen Panel 13, Urine (Pain Care Package) - lab collect           Prescription drug management,   CSA and PDMP check he's been very reliable   No LOS data to display   Time spent doing chart review, history and exam, documentation and further activities per the note  PDMP check,      BMI:   Estimated body mass index is 26.06 kg/m  as calculated from the following:    Height as of this encounter: 1.88 m (6' 2\").    Weight as of this encounter: 92.1 kg (203 lb).   Weight management plan: Discussed healthy diet and exercise guidelines  He's successfully lost weight   Regular exercise  6 month follow up       Florentino Chavez MD  [facility]        Chronic Pain Follow-Up    Where in your body do you have pain? Hips and " "lower back   How has your pain affected your ability to work? Not currently working - unrelated to pain  Which of these pain treatments have you tried since your last clinic visit? Other: none  How well are you sleeping? Fair  How has your mood been since your last visit? About the same  Have you had a significant life event? No  Other aggravating factors: prolonged sitting  Taking medication as directed? Yes    PHQ-9 SCORE 1/3/2020 1/12/2021 7/13/2021   PHQ-9 Total Score MyChart 1 (Minimal depression) 0 1 (Minimal depression)   PHQ-9 Total Score 1 0 1     JUDITH-7 SCORE 1/3/2020 1/12/2021 7/13/2021   Total Score 0 (minimal anxiety) 2 (minimal anxiety) 0 (minimal anxiety)   Total Score 0 2 0     No flowsheet data found.  Encounter-Level CSA - 06/06/2016:    Controlled Substance Agreement - Scan on 6/23/2016  9:33 AM: CONTROLLED SUBSTANCE AGREEMENT     Encounter-Level CSA - 08/31/2015:    Controlled Substance Agreement - Scan on 9/1/2015  1:56 PM: CONTROLLED SUBSTANCE AGREEMENT 8-31-15     Patient-Level CSA:    There are no patient-level csa.               Review of Systems   Constitutional, HEENT, cardiovascular, pulmonary, GI, , musculoskeletal, neuro, skin, endocrine and psych systems are negative, except as otherwise noted.      Objective    /75   Pulse 85   Temp 98.4  F (36.9  C) (Oral)   Ht 1.88 m (6' 2\")   Wt 92.1 kg (203 lb)   SpO2 97%   BMI 26.06 kg/m    Body mass index is 26.06 kg/m .  Physical Exam   GENERAL: healthy, alert and no distress  EYES: Eyes grossly normal to inspection, PERRL and conjunctivae and sclerae normal  HENT: ear canals and TM's normal, nose and mouth without ulcers or lesions  NECK: no adenopathy, no asymmetry, masses, or scars and thyroid normal to palpation  RESP: lungs clear to auscultation - no rales, rhonchi or wheezes  CV: regular rate and rhythm, normal S1 S2, no S3 or S4, no murmur, click or rub, no peripheral edema and peripheral pulses strong  ABDOMEN: soft, " nontender, no hepatosplenomegaly, no masses and bowel sounds normal  MS: no gross musculoskeletal defects noted, no edema  SKIN: no suspicious lesions or rashes  NEURO: Normal strength and tone, mentation intact and speech normal  PSYCH: mentation appears normal, affect normal/bright    hph lumbar spine issues ,  Myofascial spasm, sparing use of analgesics and zolpidem  ED issues meds are effective         (J45.41) Moderate persistent asthma with allergic rhinitis without status asthmaticus with acute exacerbation  Comment: aerosol works well  Plan: Asthma Action Plan (AAP), fluticasone (FLONASE)        50 MCG/ACT nasal spray, albuterol (PROAIR HFA)         108 (90 Base) MCG/ACT inhaler            (Z23) Need for vaccination  Comment:   Plan: ZOSTER VACCINE RECOMBINANT ADJUVANTED IM NJX        Second dose     (L65.9) Alopecia  Comment:   Plan: finasteride (PROPECIA) 1 MG tablet        Chronic user     (N52.9) Erectile dysfunction, unspecified erectile dysfunction type  Comment:   Plan: tadalafil (CIALIS) 5 MG tablet        meds effective     (G47.9) Sleep disorder  Comment:   Plan: zolpidem (AMBIEN) 10 MG tablet        Spot duty, meds effective     (M54.40) Lumbago of lumbar region with sciatica  Comment:   Plan: Drug Abuse Screen Panel 13, Urine (Pain Care         Package) - lab collect,         HYDROcodone-acetaminophen (NORCO) 5-325 MG         tablet        Still can ride his motorcycle, not working has helped him have less difficulty, analgesics for flare     (K21.00) Gastroesophageal reflux disease with esophagitis without hemorrhage  Comment:   Plan: omeprazole (PRILOSEC) 20 MG DR capsule          6 month follow up , discussed transition of care.  Urine compliance taken

## 2021-07-14 NOTE — LETTER
Opioid / Opioid Plus Controlled Substance Agreement    This is an agreement between you and your provider about the safe and appropriate use of controlled substance/opioids prescribed by your care team. Controlled substances are medicines that can cause physical and mental dependence (abuse).    There are strict laws about having and using these medicines. We here at [facility] are committing to working with you in your efforts to get better. To support you in this work, we ll help you schedule regular office appointments for medicine refills. If we must cancel or change your appointment for any reason, we ll make sure you have enough medicine to last until your next appointment.     As a Provider, I will:    Listen carefully to your concerns and treat you with respect.     Recommend a treatment plan that I believe is in your best interest. This plan may involve therapies other than opioid pain medication.     Talk with you often about the possible benefits, and the risk of harm of any medicine that we prescribe for you.     Provide a plan on how to taper (discontinue or go off) using this medicine if the decision is made to stop its use.    As a Patient, I understand that opioid(s):     Are a controlled substance prescribed by my care team to help me function or work and manage my condition(s).     Are strong medicines and can cause serious side effects such as:    Drowsiness, which can seriously affect my driving ability    A lower breathing rate, enough to cause death    Harm to my thinking ability     Depression     Abuse of and addiction to this medicine    Need to be taken exactly as prescribed. Combining opioids with certain medicines or chemicals (such as illegal drugs, sedatives, sleeping pills, and benzodiazepines) can be dangerous or even fatal. If I stop opioids suddenly, I may have severe withdrawal symptoms.    Do not work for all types of pain nor for all patients. If they re not helpful, I may  be asked to stop them.        The risks, benefits and side effects of these medicine(s) were explained to me. I agree that:  1. I will take part in other treatments as advised by my care team. This may be psychiatry or counseling, physical therapy, behavioral therapy, group treatment or a referral to a specialist.     2. I will keep all my appointments. I understand that this is part of the monitoring of opioids. My care team may require an office visit for EVERY opioid/controlled substance refill. If I miss appointments or don t follow instructions, my care team may stop my medicine.    3. I will take my medicines as prescribed. I will not change the dose or schedule unless my care team tells me to. There will be no refills if I run out early.     4. I may be asked to come to the clinic and complete a urine drug test or complete a pill count at any time. If I don t give a urine sample or participate in a pill count, the care team may stop my medicine.    5. I will only receive prescriptions from this clinic for chronic pain. If I am treated by another provider for acute pain issues, I will tell them that I am taking opioid pain medication for chronic pain and that I have a treatment agreement with this provider. I will inform my [facility] care team within one business day if I am given a prescription for any pain medication by another healthcare provider. My [facility] care team can contact other providers and pharmacists about my use of any medicines.    6. It is up to me to make sure that I don t run out of my medicines on weekends or holidays. If my care team is willing to refill my opioid prescription without a visit, I must request refills only during office hours. Refills may take up to 3 business days to process. I will use one pharmacy to fill all my opioid and other controlled substance prescriptions. I will notify the clinic about any changes to my insurance or medication  availability.    7. I am responsible for my prescriptions. If the medicine/prescription is lost, stolen or destroyed, it will not be replaced. I also agree not to share controlled substance medicines with anyone.    8. I am aware I should not use any illegal or recreational drugs. I agree not to drink alcohol unless my care team says I can.       9. If I enroll in the [place] [facility] program, I will tell my care team prior to my next refill.     10. I will tell my care team right away if I become pregnant, have a new medical problem treated outside of my regular clinic, or have a change in my medications.    11. I understand that this medicine can affect my thinking, judgment and reaction time. Alcohol and drugs affect the brain and body, which can affect the safety of my driving. Being under the influence of alcohol or drugs can affect my decision-making, behaviors, personal safety, and the safety of others. Driving while impaired (DWI) can occur if a person is driving, operating, or in physical control of a car, motorcycle, boat, snowmobile, ATV, motorbike, off-road vehicle, or any other motor vehicle ([place] Statute 169A.20). I understand the risk if I choose to drive or operate any vehicle or machinery.    I understand that if I do not follow any of the conditions above, my prescriptions or treatment may be stopped or changed.          Opioids  What You Need to Know    What are opioids?   Opioids are pain medicines that must be prescribed by a doctor. They are also known as narcotics.     Examples are:   1. morphine (MS Contin, Janelle)  2. oxycodone (Oxycontin)  3. oxycodone and acetaminophen (Percocet)  4. hydrocodone and acetaminophen (Vicodin, Norco)   5. fentanyl patch (Duragesic)   6. hydromorphone (Dilaudid)   7. methadone  8. codeine (Tylenol #3)     What do opioids do well?   Opioids are best for severe short-term pain such as after a surgery or injury. They may work well for cancer pain. They may  help some people with long-lasting (chronic) pain.     What do opioids NOT do well?   Opioids never get rid of pain entirely, and they don t work well for most patients with chronic pain. Opioids don t reduce swelling, one of the causes of pain.                                    Other ways to manage chronic pain and improve function include:       Treat the health problem that may be causing pain    Anti-inflammation medicines, which reduce swelling and tenderness, such as ibuprofen (Advil, Motrin) or naproxen (Aleve)    Acetaminophen (Tylenol)    Antidepressants and anti-seizure medicines, especially for nerve pain    Topical treatments such as patches or creams    Injections or nerve blocks    Chiropractic or osteopathic treatment    Acupuncture, massage, deep breathing, meditation, visual imagery, aromatherapy    Use heat or ice at the pain site    Physical therapy     Exercise    Stop smoking    Take part in therapy       Risks and side effects     Talk to your doctor before you start or decide to keep taking opioids. Possible side effects include:      Lowering your breathing rate enough to cause death    Overdose, including death, especially if taking higher than prescribed doses    Worse depression symptoms; less pleasure in things you usually enjoy    Feeling tired or sluggish    Slower thoughts or cloudy thinking    Being more sensitive to pain over time; pain is harder to control    Trouble sleeping or restless sleep    Changes in hormone levels (for example, less testosterone)    Changes in sex drive or ability to have sex    Constipation    Unsafe driving    Itching and sweating    Dizziness    Nausea, throwing up and dry mouth    What else should I know about opioids?    Opioids may lead to dependence, tolerance, or addiction.      Dependence means that if you stop or reduce the medicine too quickly, you will have withdrawal symptoms. These include loose poop (diarrhea), jitters, flu-like symptoms,  nervousness and tremors. Dependence is not the same as addiction.                       Tolerance means needing higher doses over time to get the same effect. This may increase the chance of serious side effects.      Addiction is when people improperly use a substance that harms their body, their mind or their relations with others. Use of opiates can cause a relapse of addiction if you have a history of drug or alcohol abuse.      People who have used opioids for a long time may have a lower quality of life, worse depression, higher levels of pain and more visits to doctors.    You can overdose on opioids. Take these steps to lower your risk of overdose:    1. Recognize the signs:  Signs of overdose include decrease or loss of consciousness (blackout), slowed breathing, trouble waking up and blue lips. If someone is worried about overdose, they should call 911.    2. Talk to your doctor about Narcan (naloxone).   If you are at risk for overdose, you may be given a prescription for Narcan. This medicine very quickly reverses the effects of opioids.   If you overdose, a friend or family member can give you Narcan while waiting for the ambulance. They need to know the signs of overdose and how to give Narcan.     3. Don't use alcohol or street drugs.   Taking them with opioids can cause death.    4. Do not take any of these medicines unless your doctor says it s OK. Taking these with opioids can cause death:    Benzodiazepines, such as lorazepam (Ativan), alprazolam (Xanax) or diazepam (Valium)    Muscle relaxers, such as cyclobenzaprine (Flexeril)    Sleeping pills like zolpidem (Ambien)     Other opioids      How to keep you and other people safe while taking opioids:    1. Never share your opioids with others.  Opioid medicines are regulated by the Drug Enforcement Agency (JAIDEN). Selling or sharing medications is a criminal act.    2. Be sure to store opioids in a secure place, locked up if possible. Young children  can easily swallow them and overdose.    3. When you are traveling with your medicines, keep them in the original bottles. If you use a pill box, be sure you also carry a copy of your medicine list from your clinic or pharmacy.    4. Safe disposal of opioids    Most pharmacies have places to get rid of medicine, called disposal kiosks. Medicine disposal options are also available in every [facility]. Search your county and  medication disposal  to find more options. You can find more details at:  [email or web address]     I agree that my provider, clinic care team, and pharmacy may work with any city, state or federal law enforcement agency that investigates the misuse, sale, or other diversion of my controlled medicine. I will allow my provider to discuss my care with, or share a copy of, this agreement with any other treating provider, pharmacy or emergency room where I receive care.    I have read this agreement and have asked questions about anything I did not understand.    _______________________________________________________  Patient Signature - Christopher Buchanan _____________________                   Date     _______________________________________________________  Provider Signature - Florentino Chavez MD   _____________________                   Date     _______________________________________________________  Witness Signature (required if provider not present while patient signing)   _____________________                   Date

## 2021-07-15 ENCOUNTER — TELEPHONE (OUTPATIENT)
Dept: FAMILY MEDICINE | Facility: CLINIC | Age: 66
End: 2021-07-15

## 2021-07-15 ENCOUNTER — HOSPITAL ENCOUNTER (EMERGENCY)
Facility: CLINIC | Age: 66
Discharge: HOME | End: 2021-07-15
Attending: EMERGENCY MEDICINE | Admitting: EMERGENCY MEDICINE
Payer: COMMERCIAL

## 2021-07-15 ENCOUNTER — APPOINTMENT (OUTPATIENT)
Dept: ULTRASOUND IMAGING | Facility: CLINIC | Age: 66
End: 2021-07-15
Attending: EMERGENCY MEDICINE
Payer: COMMERCIAL

## 2021-07-15 VITALS
TEMPERATURE: 98.6 F | WEIGHT: 203 LBS | HEIGHT: 74 IN | RESPIRATION RATE: 18 BRPM | SYSTOLIC BLOOD PRESSURE: 135 MMHG | DIASTOLIC BLOOD PRESSURE: 89 MMHG | OXYGEN SATURATION: 99 % | HEART RATE: 89 BPM | BODY MASS INDEX: 26.05 KG/M2

## 2021-07-15 DIAGNOSIS — N45.2 ORCHITIS OF LEFT TESTICLE: Primary | ICD-10-CM

## 2021-07-15 LAB
ALBUMIN UR-MCNC: NEGATIVE MG/DL
ANION GAP SERPL CALCULATED.3IONS-SCNC: 4 MMOL/L (ref 3–14)
APPEARANCE UR: CLEAR
BASOPHILS # BLD AUTO: 0 10E3/UL (ref 0–0.2)
BASOPHILS NFR BLD AUTO: 0 %
BILIRUB UR QL STRIP: NEGATIVE
BUN SERPL-MCNC: 14 MG/DL (ref 7–30)
CALCIUM SERPL-MCNC: 8.9 MG/DL (ref 8.5–10.1)
CHLORIDE BLD-SCNC: 107 MMOL/L (ref 94–109)
CO2 SERPL-SCNC: 28 MMOL/L (ref 20–32)
COLOR UR AUTO: YELLOW
CREAT SERPL-MCNC: 0.89 MG/DL (ref 0.66–1.25)
EOSINOPHIL # BLD AUTO: 0 10E3/UL (ref 0–0.7)
EOSINOPHIL NFR BLD AUTO: 0 %
ERYTHROCYTE [DISTWIDTH] IN BLOOD BY AUTOMATED COUNT: 12.4 % (ref 10–15)
GFR SERPL CREATININE-BSD FRML MDRD: 88 ML/MIN/1.73M2
GLUCOSE BLD-MCNC: 117 MG/DL (ref 70–99)
GLUCOSE UR STRIP-MCNC: NEGATIVE MG/DL
HCT VFR BLD AUTO: 47.5 % (ref 40–53)
HGB BLD-MCNC: 15.5 G/DL (ref 13.3–17.7)
HGB UR QL STRIP: NEGATIVE
HOLD SPECIMEN: NORMAL
IMM GRANULOCYTES # BLD: 0.1 10E3/UL
IMM GRANULOCYTES NFR BLD: 1 %
KETONES UR STRIP-MCNC: 20 MG/DL
LEUKOCYTE ESTERASE UR QL STRIP: NEGATIVE
LYMPHOCYTES # BLD AUTO: 1.2 10E3/UL (ref 0.8–5.3)
LYMPHOCYTES NFR BLD AUTO: 8 %
MCH RBC QN AUTO: 32.8 PG (ref 26.5–33)
MCHC RBC AUTO-ENTMCNC: 32.6 G/DL (ref 31.5–36.5)
MCV RBC AUTO: 100 FL (ref 78–100)
MONOCYTES # BLD AUTO: 1.2 10E3/UL (ref 0–1.3)
MONOCYTES NFR BLD AUTO: 8 %
MUCOUS THREADS #/AREA URNS LPF: PRESENT /LPF
NEUTROPHILS # BLD AUTO: 12.4 10E3/UL (ref 1.6–8.3)
NEUTROPHILS NFR BLD AUTO: 83 %
NITRATE UR QL: NEGATIVE
NRBC # BLD AUTO: 0 10E3/UL
NRBC BLD AUTO-RTO: 0 /100
PH UR STRIP: 6.5 [PH] (ref 5–7)
PLATELET # BLD AUTO: 222 10E3/UL (ref 150–450)
POTASSIUM BLD-SCNC: 3.8 MMOL/L (ref 3.4–5.3)
RBC # BLD AUTO: 4.73 10E6/UL (ref 4.4–5.9)
RBC URINE: 3 /HPF
SODIUM SERPL-SCNC: 139 MMOL/L (ref 133–144)
SP GR UR STRIP: 1.02 (ref 1–1.03)
UROBILINOGEN UR STRIP-MCNC: NORMAL MG/DL
WBC # BLD AUTO: 14.9 10E3/UL (ref 4–11)
WBC URINE: 1 /HPF

## 2021-07-15 PROCEDURE — 99284 EMERGENCY DEPT VISIT MOD MDM: CPT | Mod: 25

## 2021-07-15 PROCEDURE — 36415 COLL VENOUS BLD VENIPUNCTURE: CPT | Performed by: EMERGENCY MEDICINE

## 2021-07-15 PROCEDURE — 81001 URINALYSIS AUTO W/SCOPE: CPT | Performed by: EMERGENCY MEDICINE

## 2021-07-15 PROCEDURE — 96374 THER/PROPH/DIAG INJ IV PUSH: CPT

## 2021-07-15 PROCEDURE — 250N000011 HC RX IP 250 OP 636: Performed by: STUDENT IN AN ORGANIZED HEALTH CARE EDUCATION/TRAINING PROGRAM

## 2021-07-15 PROCEDURE — 80048 BASIC METABOLIC PNL TOTAL CA: CPT | Performed by: EMERGENCY MEDICINE

## 2021-07-15 PROCEDURE — 85025 COMPLETE CBC W/AUTO DIFF WBC: CPT | Performed by: EMERGENCY MEDICINE

## 2021-07-15 PROCEDURE — 76870 US EXAM SCROTUM: CPT

## 2021-07-15 RX ORDER — LEVOFLOXACIN 500 MG/1
500 TABLET, FILM COATED ORAL DAILY
Qty: 10 TABLET | Refills: 0 | Status: SHIPPED | OUTPATIENT
Start: 2021-07-15 | End: 2021-07-25

## 2021-07-15 RX ORDER — KETOROLAC TROMETHAMINE 15 MG/ML
15 INJECTION, SOLUTION INTRAMUSCULAR; INTRAVENOUS ONCE
Status: COMPLETED | OUTPATIENT
Start: 2021-07-15 | End: 2021-07-15

## 2021-07-15 RX ADMIN — KETOROLAC TROMETHAMINE 15 MG: 15 INJECTION, SOLUTION INTRAMUSCULAR; INTRAVENOUS at 09:24

## 2021-07-15 ASSESSMENT — ENCOUNTER SYMPTOMS
COLOR CHANGE: 0
DYSURIA: 0
CHILLS: 0
SHORTNESS OF BREATH: 0
VOMITING: 0
NAUSEA: 1
CONSTIPATION: 0
ABDOMINAL PAIN: 1
FEVER: 0
FREQUENCY: 0
DIAPHORESIS: 1
COUGH: 0
FLANK PAIN: 0
HEMATURIA: 0

## 2021-07-15 ASSESSMENT — ASTHMA QUESTIONNAIRES: ACT_TOTALSCORE: 18

## 2021-07-15 ASSESSMENT — MIFFLIN-ST. JEOR: SCORE: 1765.55

## 2021-07-15 NOTE — ED PROVIDER NOTES
History     Chief Complaint:  Testicular pain      HPI   Christopher Buchanan is a 65 year old male w/ hx of hydrocele who presents with left testicular pain. Has reportedly had L hydrocele that has been stable and not painful and he has never had complications before. He states that he was feeling well until 0400 this morning when he woke with 10/10 pain in his LLQ and testicle, worse with sitting up worse with palpation. Responded well enough to one Vicodin and a tylenol that he was able to go to sleep. He states he woke this morning with a full return of his pain, worsened by standing or sitting. Describes it as a squeezing/pulling pain, never had anything similar. Notes he was able to have a bowel movement and was able to urinate, urine perhaps darker than usual. Denies any new sexual partners since last STI testing, no fevers or chills, no urinary symptoms, no history kidney stones.     Review of Systems   Constitutional: Positive for diaphoresis. Negative for chills and fever.   Respiratory: Negative for cough and shortness of breath.    Gastrointestinal: Positive for abdominal pain and nausea. Negative for constipation and vomiting.   Genitourinary: Positive for scrotal swelling and testicular pain. Negative for discharge, dysuria, flank pain, frequency, hematuria, penile pain, penile swelling and urgency.   Musculoskeletal:        Chronic stable back pain and neck pain.   Skin: Negative for color change and rash.   All other systems reviewed and are negative.      Allergies  Gabapentin  Tizanidine  Latex  Pcn [Penicillins]      Medications:    levofloxacin (LEVAQUIN) 500 MG tablet  albuterol (PROAIR HFA) 108 (90 Base) MCG/ACT inhaler  cyclobenzaprine (FLEXERIL) 5 MG tablet  finasteride (PROPECIA) 1 MG tablet  finasteride (PROPECIA) 1 MG tablet  fluticasone (FLONASE) 50 MCG/ACT nasal spray  HYDROcodone-acetaminophen (NORCO) 5-325 MG tablet  omeprazole (PRILOSEC) 20 MG DR capsule  tadalafil (CIALIS) 5 MG  tablet  zolpidem (AMBIEN) 10 MG tablet        Past Medical History:    Past Medical History:   Diagnosis Date     ALLERGIC RHINITIS NOS 5/8/2003     CARDIOVASCULAR SCREENING; LDL GOAL LESS THAN 160 10/31/2010     Erectile dysfunction 9/30/2011     Esophageal reflux 5/8/2003     GENERAL OSTEOARTHROSIS 5/8/2003     Hernia, abdominal      Hydrocele      Hydrocele, left 3/31/2011     Low back pain 9/30/2011     Moderate persistent asthma 3/21/2011     Mumps      Prostatism      Tuberculosis      Patient Active Problem List    Diagnosis Date Noted     Paroxysmal supraventricular tachycardia (H) 12/02/2016     Priority: Medium     Hip joint painful on movement, unspecified laterality 02/18/2016     Priority: Medium     Other chronic pain 08/31/2015     Priority: Medium     Patient is followed by JESSICA RIVERA for ongoing prescription of pain medication.  All refills should be approved by this provider, or covering partner.    Medication(s): HYDROcodone-acetaminophen (NORCO) 5-325 MG per tablet.   Maximum quantity per month: 30  Clinic visit frequency required: Q 4 months     Controlled substance agreement on file: Yes       Date(s): 8/31/15    Pain Clinic evaluation in the past: No    DIRE Total Score(s):  No flowsheet data found.    Last [facility] website verification: done 5.26.17   [email or web address]         Right low back pain, with sciatica presence unspecified 07/10/2015     Priority: Medium     Erectile dysfunction 09/30/2011     Priority: Medium     Hydrocele, left 03/31/2011     Priority: Medium     Prostatism      Priority: Medium     Moderate persistent asthma 03/21/2011     Priority: Medium     CARDIOVASCULAR SCREENING; LDL GOAL LESS THAN 160 10/31/2010     Priority: Medium     Allergic rhinitis 05/08/2003     Priority: Medium     Problem list name updated by automated process. Provider to review       Generalized osteoarthrosis, unspecified site 05/08/2003     Priority: Medium     Esophageal  "reflux 2003     Priority: Medium        Past Surgical History:    Past Surgical History:   Procedure Laterality Date     C SPLINT      right thumb     EYE SURGERY      YAG procedure       Family History:    Family History   Problem Relation Age of Onset     C.A.D. Father          at 52 of heart failure       Social History:  No smoking, lives with wife and reports monogamy     Physical Exam     Patient Vitals for the past 24 hrs:   BP Temp Temp src Pulse Resp SpO2 Height Weight   07/15/21 1145 135/89 -- -- 89 18 99 % -- --   07/15/21 0852 (!) 141/105 98.6  F (37  C) Oral 98 22 99 % 1.88 m (6' 2\") 92.1 kg (203 lb)       Physical Exam  Vitals and nursing note reviewed.   Constitutional:       Appearance: Normal appearance.      Comments: Uncomfortable appearing   Cardiovascular:      Rate and Rhythm: Normal rate and regular rhythm.   Pulmonary:      Effort: Pulmonary effort is normal.   Abdominal:      General: Abdomen is flat.      Palpations: Abdomen is soft.      Tenderness: There is no right CVA tenderness or left CVA tenderness.      Hernia: No hernia is present.      Comments: LLQ tender to palpation, no rebound, no distension.   Genitourinary:     Penis: Normal.       Comments: R testicle nontender, mobile. L testicle with surrounding swelling, does not tolerate much palpation, not hot or tense, christopher sized  Skin:     General: Skin is warm and dry.   Neurological:      General: No focal deficit present.      Mental Status: He is alert and oriented to person, place, and time.   Psychiatric:         Mood and Affect: Mood normal.         Behavior: Behavior normal.         Emergency Department Course     Imaging:  US Testicular & Scrotum w Doppler Ltd   Final Result   IMPRESSION:   1.  Findings suggestive of left orchitis with hyperemic and edematous   left testicle.   2.  Large left hydrocele with a few internal septations. Previously   seen large left epididymal head cyst is difficult to " distinguish from   the left hydrocele.   3.  Slight increased size of the 1.5 cm benign left intratesticular   cyst, previously measuring 1.1 cm.   4.  Tiny, 4 mm right epididymal head cyst.      HAYDER BROCK MD            SYSTEM ID:  [id number]          Laboratory:  Labs Ordered and Resulted from Time of ED Arrival Up to the Time of Departure from the ED   ROUTINE UA WITH MICROSCOPIC - Abnormal; Notable for the following components:       Result Value    Ketones Urine 20  (*)     Mucus Urine Present (*)     RBC Urine 3 (*)     All other components within normal limits   BASIC METABOLIC PANEL - Abnormal; Notable for the following components:    Glucose 117 (*)     All other components within normal limits   CBC WITH PLATELETS AND DIFFERENTIAL - Abnormal; Notable for the following components:    WBC Count 14.9 (*)     Absolute Neutrophils 12.4 (*)     Absolute Immature Granulocytes 0.1 (*)     All other components within normal limits   EXTRA RED TOP TUBE   EXTRA GREEN TOP (LITHIUM HEPARIN) TUBE   EXTRA PURPLE TOP TUBE   EXTRA GREEN TOP (LITHIUM HEPARIN) TUBE   CBC WITH PLATELETS & DIFFERENTIAL    Narrative:     The following orders were created for panel order CBC + differential.  Procedure                               Abnormality         Status                     ---------                               -----------         ------                     CBC with platelets and d...[[id number]]  Abnormal            Final result                 Please view results for these tests on the individual orders.   EXTRA TUBE    Narrative:     The following orders were created for panel order Extra Tube ([place] Draw).  Procedure                               Abnormality         Status                     ---------                               -----------         ------                     Extra Red Top Tube[[id number]]                               Final result               Extra Green Top (Lithium...[[id number]]                       Final result               Extra Purple Top Tube[[id number]]                            Final result                 Please view results for these tests on the individual orders.   EXTRA TUBE    Narrative:     The following orders were created for panel order Extra Tube ([place] Draw).  Procedure                               Abnormality         Status                     ---------                               -----------         ------                     Extra Green Top (Lithium...[[id number]]                      Final result                 Please view results for these tests on the individual orders.       Emergency Department Course:    Reviewed:  I reviewed nursing notes, vitals and past history    Assessments:  905 I obtained history and examined the patient as noted above.    I rechecked the patient, he's feeling improvement after Toradol.    1115 Spoke with patient about plan to discharge after US result, pending UA. Discussed return precautions, patient had no further questions or concerns and all questions were answered.     Interventions:  Medications   ketorolac (TORADOL) injection 15 mg (15 mg Intravenous Given 7/15/21 0924)       Disposition:  The patient was discharged to home.    Impression & Plan      Medical Decision Makin yo m hx hydrocele on L testicle presenting with testicular pain and swelling, lower abdominal pain. Ddx includes but is not limited to testicular torsion, hydrocele, inguinal hernia, orchitis; lower abdominal pain ddx includes referred testicular pain, colitis, kidney stone. Labs significant for WBC 14.9 (received shingles vaccine ), US shows orchitis but no torsion. UA not suggestive of infection. Given elevated WBC and US findings, discharging with antibiotics for orchitis. Patient had moderate relief with Toradol, would start pain management with advil, supportive underwear, ice as needed. Patient will be discharged with levofloxacin 500mg BID for 10 days,  follow up with urologist Dr Watson.    Mya Morrison MD PGY-2    Covid-19  Christopher Buchanan was evaluated during a global COVID-19 pandemic, which necessitated consideration that the patient might be at risk for infection with the SARS-CoV-2 virus that causes COVID-19.   Applicable protocols for evaluation were followed during the patient's care.     Diagnosis:    ICD-10-CM    1. Orchitis of left testicle  N45.2        Discharge Medications:  New Prescriptions    LEVOFLOXACIN (LEVAQUIN) 500 MG TABLET    Take 1 tablet (500 mg) by mouth daily for 10 days          Mya Morrison MD  Resident  07/15/21 1148

## 2021-07-15 NOTE — DISCHARGE INSTRUCTIONS
Return to care if you develop increased fevers, nausea/vomiting such that you can't keep down medications, severely worsening pain.     Take one levofloxacin tablet once a day for 10 days. Ibuprofen, ice, and supportive underwear for comfort.

## 2021-07-15 NOTE — ED TRIAGE NOTES
Left testicle pain with some swelling, started during the night.  He has a history of a cyst on this testicle.  He also has pain in his LLQ abdomen. He is nauseated.  ABCs intact.  Patient is alert and oriented x3.

## 2021-07-15 NOTE — ED PROVIDER NOTES
Emergency Department Attending Supervision Note  7/15/2021  9:06 AM      I evaluated this patient with Clarita De La Vega, PGY-2.       Briefly, the patient presented with L testicular pain and swelling.  He reports this is been there for several months and is at increase in size and pain no difficulty walking.  Denies any trouble urinating including burning with urination, blood in his urine, reports no painful defecation or painful ejaculation but has not tried since symptoms worsen.      On my exam, significant left testicular swelling and diffuse tenderness, no obvious inguinal hernia and mild left lower quadrant tenderness.    Workup is notable for   US Testicular & Scrotum w Doppler Ltd   Final Result   IMPRESSION:   1.  Findings suggestive of left orchitis with hyperemic and edematous   left testicle.   2.  Large left hydrocele with a few internal septations. Previously   seen large left epididymal head cyst is difficult to distinguish from   the left hydrocele.   3.  Slight increased size of the 1.5 cm benign left intratesticular   cyst, previously measuring 1.1 cm.   4.  Tiny, 4 mm right epididymal head cyst.      HAYDER BROCK MD            SYSTEM ID:  [id number]        Labs Ordered and Resulted from Time of ED Arrival Up to the Time of Departure from the ED   ROUTINE UA WITH MICROSCOPIC - Abnormal; Notable for the following components:       Result Value    Ketones Urine 20  (*)     Mucus Urine Present (*)     RBC Urine 3 (*)     All other components within normal limits   BASIC METABOLIC PANEL - Abnormal; Notable for the following components:    Glucose 117 (*)     All other components within normal limits   CBC WITH PLATELETS AND DIFFERENTIAL - Abnormal; Notable for the following components:    WBC Count 14.9 (*)     Absolute Neutrophils 12.4 (*)     Absolute Immature Granulocytes 0.1 (*)     All other components within normal limits   EXTRA RED TOP TUBE   EXTRA GREEN TOP (LITHIUM HEPARIN) TUBE   EXTRA  PURPLE TOP TUBE   EXTRA GREEN TOP (LITHIUM HEPARIN) TUBE   CBC WITH PLATELETS & DIFFERENTIAL    Narrative:     The following orders were created for panel order CBC + differential.  Procedure                               Abnormality         Status                     ---------                               -----------         ------                     CBC with platelets and d...[[id number]]  Abnormal            Final result                 Please view results for these tests on the individual orders.   EXTRA TUBE    Narrative:     The following orders were created for panel order Extra Tube (Gibson Draw).  Procedure                               Abnormality         Status                     ---------                               -----------         ------                     Extra Red Top Tube[[id number]]                               Final result               Extra Green Top (Lithium...[[id number]]                      Final result               Extra Purple Top Tube[[id number]]                            Final result                 Please view results for these tests on the individual orders.   EXTRA TUBE    Narrative:     The following orders were created for panel order Extra Tube (Gibson Draw).  Procedure                               Abnormality         Status                     ---------                               -----------         ------                     Extra Green Top (Lithium...[[id number]]                      Final result                 Please view results for these tests on the individual orders.         In summary, my impression is hydrocele with orchitis, associated with mild leukocytosis, no other evidence of systemic infection including normal fever heart rate and blood pressure.  UA shows without UTI, no sx, culture deferred.  Low suspicion for STIs, will initiate Levaquin 500 mg daily for 10 days and recommended urology follow-up. Return precautions reviewed, recommend OTC pain meds  including NSAIDS, elevation of scrotum and ice.        Disposition:  Discharge     Terry Christnie MD  [facility]  [facility] Emergency Department    9:06 AM 07/15/21             Terry Christine MD  07/15/21 1970

## 2021-07-15 NOTE — TELEPHONE ENCOUNTER
Patient called to report in extreme pain, had second Shingles vaccine and during the night woke to a sore arm  felt like someone was squeezing testicles, has a cyst on left testicle been there for awhile.   Cyst has grown over night causing extreme pain in left testicle can hardly walk rates pain 10/10   Did take a Vicodin and tylenol at 4 am but pain is back with a vengeance.     Nurse advised patient needs to go to ED to be evaluated. May have to rosa or remove the cyst. My have testicle torsion. Patient verbalized understanding and agreement with plan and had no questions will have wife drive patient to ED.      Windy Lara RN  [facility]

## 2021-07-17 ENCOUNTER — NURSE TRIAGE (OUTPATIENT)
Dept: NURSING | Facility: CLINIC | Age: 66
End: 2021-07-17

## 2021-07-17 NOTE — TELEPHONE ENCOUNTER
Wife says patient was seen the in ED and still has a fever and pain. Current temp is 37.4 C; previously 38.3 C. Patient started the antibiotic 4-5 pm on 7/15/21.    Advised patient should call back tomorrow if still having a fever. Advised fever may still be present up to 48 hrs.    Wife verbalized understanding.    Additional Information    [1] Caller is not with the adult (patient) AND [2] probable NON-URGENT symptoms    Negative: Requesting regular office appointment    Negative: [1] Caller requesting NON-URGENT health information AND [2] PCP's office is the best resource    Negative: Health Information question, no triage required and triager able to answer question    Negative: General information question, no triage required and triager able to answer question    Negative: Question about upcoming scheduled test, no triage required and triager able to answer question    Negative: RN needs further essential information from caller in order to complete triage    Protocols used: INFORMATION ONLY CALL-A-

## 2021-07-19 ENCOUNTER — TELEPHONE (OUTPATIENT)
Dept: UROLOGY | Facility: CLINIC | Age: 66
End: 2021-07-19

## 2021-07-19 ENCOUNTER — MYC MEDICAL ADVICE (OUTPATIENT)
Dept: FAMILY MEDICINE | Facility: CLINIC | Age: 66
End: 2021-07-19

## 2021-07-19 DIAGNOSIS — N45.4: Primary | ICD-10-CM

## 2021-07-19 RX ORDER — CLINDAMYCIN HCL 150 MG
150 CAPSULE ORAL 4 TIMES DAILY
Qty: 40 CAPSULE | Refills: 0 | Status: SHIPPED | OUTPATIENT
Start: 2021-07-19 | End: 2021-08-09

## 2021-07-19 NOTE — TELEPHONE ENCOUNTER
----- Message from Xi Bonner PA-C sent at 7/19/2021 11:52 AM CDT -----  If it is low grade, it think we can monitor.  If the swelling is getting worse and the temp is high, then we might want to reimage to make sure no abscess.  It sounded like it was low grade from the not to his primary.  ----- Message -----  From: Arlene Cox LPN  Sent: 7/19/2021  10:22 AM CDT  To: Xi Bonner PA-C    Patient was seen in the ER for testicular infection,on Levaquin.  He is seeing SB on Friday,he still has a fever,but pain is better.  Anything more I should do?  Arlene Cox LPN

## 2021-07-19 NOTE — TELEPHONE ENCOUNTER
Will route to triage    Vannessa Sánchez/[facility]  [place]---[facility]

## 2021-07-19 NOTE — TELEPHONE ENCOUNTER
I spoke with patient and temp is low grade,PMD did switch out his antibiotic to Clindamycin,he will continue ibuprofen,elevation and ice,call if his symptoms change or worsen.  Arlene Cox LPN

## 2021-07-21 ENCOUNTER — MYC MEDICAL ADVICE (OUTPATIENT)
Dept: FAMILY MEDICINE | Facility: CLINIC | Age: 66
End: 2021-07-21

## 2021-07-21 NOTE — TELEPHONE ENCOUNTER
"Please advise on MyChart message, per Dr. Chavez message, \"check back with me on mychart after three days\".    Thank you.    JUAN JayN, RN - Patient Advocate and Liaison (PAL)   [facility]   [phone]  "

## 2021-07-23 ENCOUNTER — OFFICE VISIT (OUTPATIENT)
Dept: UROLOGY | Facility: CLINIC | Age: 66
End: 2021-07-23
Payer: COMMERCIAL

## 2021-07-23 VITALS
SYSTOLIC BLOOD PRESSURE: 130 MMHG | WEIGHT: 199 LBS | HEIGHT: 74 IN | BODY MASS INDEX: 25.54 KG/M2 | DIASTOLIC BLOOD PRESSURE: 80 MMHG

## 2021-07-23 DIAGNOSIS — N45.2 ORCHITIS: Primary | ICD-10-CM

## 2021-07-23 DIAGNOSIS — N43.3 HYDROCELE, UNSPECIFIED HYDROCELE TYPE: ICD-10-CM

## 2021-07-23 DIAGNOSIS — Z11.59 ENCOUNTER FOR SCREENING FOR OTHER VIRAL DISEASES: ICD-10-CM

## 2021-07-23 LAB
ALBUMIN UR-MCNC: ABNORMAL MG/DL
APPEARANCE UR: CLEAR
BILIRUB UR QL STRIP: ABNORMAL
COLOR UR AUTO: YELLOW
GLUCOSE UR STRIP-MCNC: NEGATIVE MG/DL
HGB UR QL STRIP: NEGATIVE
KETONES UR STRIP-MCNC: ABNORMAL MG/DL
LEUKOCYTE ESTERASE UR QL STRIP: NEGATIVE
NITRATE UR QL: NEGATIVE
PH UR STRIP: 6 [PH] (ref 5–7)
SP GR UR STRIP: >=1.03 (ref 1–1.03)
UROBILINOGEN UR STRIP-ACNC: 0.2 E.U./DL

## 2021-07-23 PROCEDURE — 99214 OFFICE O/P EST MOD 30 MIN: CPT | Performed by: UROLOGY

## 2021-07-23 PROCEDURE — 81003 URINALYSIS AUTO W/O SCOPE: CPT | Mod: QW | Performed by: UROLOGY

## 2021-07-23 RX ORDER — TRAMADOL HYDROCHLORIDE 50 MG/1
50 TABLET ORAL EVERY 6 HOURS PRN
Qty: 10 TABLET | Refills: 1 | Status: SHIPPED | OUTPATIENT
Start: 2021-07-23 | End: 2021-07-26

## 2021-07-23 RX ORDER — CEFAZOLIN SODIUM 2 G/50ML
2 SOLUTION INTRAVENOUS SEE ADMIN INSTRUCTIONS
Status: CANCELLED | OUTPATIENT
Start: 2021-07-23

## 2021-07-23 RX ORDER — CEFAZOLIN SODIUM 2 G/50ML
2 SOLUTION INTRAVENOUS
Status: CANCELLED | OUTPATIENT
Start: 2021-07-23

## 2021-07-23 ASSESSMENT — PAIN SCALES - GENERAL: PAINLEVEL: SEVERE PAIN (7)

## 2021-07-23 ASSESSMENT — MIFFLIN-ST. JEOR: SCORE: 1747.41

## 2021-07-23 NOTE — PROGRESS NOTES
Office Visit Note  [facility]  [phone]    UROLOGIC DIAGNOSES:   Left testicular and epididymal cysts    CURRENT INTERVENTIONS:       HISTORY:   This is a 65-year-old gentleman who I saw in  in 2018 for left epididymal cysts.  He had planned to have these removed but then he said he was delayed further by the coronavirus pandemic.  He is in the emergency department last week with worsening swelling of the left testicle.  He had an ultrasound performed that showed a large left hydrocele and possible orchitis of the left testicle.  He was sent home on Levaquin but that has now been changed to Bactrim.  He has slowly gotten better on the antibiotics.  The left hemiscrotum is quite large and bothersome to him now.      PAST MEDICAL HISTORY:   Past Medical History:   Diagnosis Date     ALLERGIC RHINITIS NOS 2003     CARDIOVASCULAR SCREENING; LDL GOAL LESS THAN 160 10/31/2010     Erectile dysfunction 2011     Esophageal reflux 2003     GENERAL OSTEOARTHROSIS 2003     Hernia, abdominal      Hydrocele      Hydrocele, left 3/31/2011     Low back pain 2011     Moderate persistent asthma 3/21/2011     Mumps      Prostatism      Tuberculosis        PAST SURGICAL HISTORY:   Past Surgical History:   Procedure Laterality Date     C SPLINT      right thumb     EYE SURGERY      YAG procedure       FAMILY HISTORY:   Family History   Problem Relation Age of Onset     C.A.D. Father          at 52 of heart failure       SOCIAL HISTORY:   Social History     Socioeconomic History     Marital status:      Spouse name: Breana     Number of children: 0     Years of education: None     Highest education level: None   Occupational History     Occupation: /transportation with mentally challenged adults     Employer: [employer]   Tobacco Use     Smoking status: Never Smoker     Smokeless tobacco: Never Used   Vaping Use     Vaping Use: Never used   Substance and  "Sexual Activity     Alcohol use: Yes     Comment: 1 beer daily     Drug use: No     Sexual activity: Yes     Partners: Female   Other Topics Concern     Parent/sibling w/ CABG, MI or angioplasty before 65F 55M? Not Asked   Social History Narrative     None     Social Determinants of Health     Financial Resource Strain:      Difficulty of Paying Living Expenses:    Food Insecurity:      Worried About Running Out of Food in the Last Year:      Ran Out of Food in the Last Year:    Transportation Needs:      Lack of Transportation (Medical):      Lack of Transportation (Non-Medical):    Physical Activity:      Days of Exercise per Week:      Minutes of Exercise per Session:    Stress:      Feeling of Stress :    Social Connections:      Frequency of Communication with Friends and Family:      Frequency of Social Gatherings with Friends and Family:      Attends Hindu Services:      Active Member of Clubs or Organizations:      Attends Club or Organization Meetings:      Marital Status:    Intimate Partner Violence:      Fear of Current or Ex-Partner:      Emotionally Abused:      Physically Abused:      Sexually Abused:        Review Of Systems:  Skin: No rash, pruritis, or skin pigmentation  Eyes: No changes in vision  Ears/Nose/Throat: No changes in hearing, no nosebleeds  Respiratory: No shortness of breath, dyspnea on exertion, cough, or hemoptysis  Cardiovascular: No chest pain or palpitations  Gastrointestinal: No diarrhea or constipation. No abdominal pain. No hematochezia  Genitourinary: see HPI  Musculoskeletal: No pain or swelling of joints, normal range of motion  Neurologic: No weakness or tremors  Psychiatric: No recent changes in memory or mood  Hematologic/Lymphatic/Immunologic: No easy bruising or enlarged lymph nodes  Endocrine: No weight gain or loss      PHYSICAL EXAM:    /80   Ht 1.88 m (6' 2\")   Wt 90.3 kg (199 lb)   BMI 25.55 kg/m      Constitutional: Well developed. Conversant and in " no acute distress  Eyes: Anicteric sclera, conjunctiva clear, normal extraocular movements  ENT: Normocephalic and atraumatic,   Skin: Warm and dry. No rashes or lesions  Cardiac: No peripheral edema  Back/Flank: Not done  CNS/PNS: Normal musculature and movements, moves all extremities normally  Respiratory: Normal non-labored breathing  Abdomen: Soft nontender and nondistended  Peripheral Vascular: No peripheral edema  Mental Status/Psych: Alert and Oriented x 3. Normal mood and affect    Penis: Normal  Scrotal skin: Normal, no lesions  Testicles: He has a large left hydrocele present.  It is nontender to palpation.  Right testicle is normal.  Epididymis: Normal to palpation on the right side  Lymphatic: Normal inguinal lymph nodes  Digital Rectal Exam:     Cystoscopy: Not done    Imaging: None    Urinalysis: UA RESULTS:  Recent Labs   Lab Test 07/23/21  0841 07/15/21  1101   COLOR Yellow Yellow   APPEARANCE Clear Clear   URINEGLC Negative Negative   URINEBILI Small* Negative   URINEKETONE Trace* 20 *   SG >=1.030 1.023   UBLD Negative Negative   URINEPH 6.0 6.5   PROTEIN Trace* Negative   UROBILINOGEN 0.2  --    NITRITE Negative Negative   LEUKEST Negative Negative   RBCU  --  3*   WBCU  --  1       PSA: 1.48    Post Void Residual:     Other labs: None today      IMPRESSION:  Left hydrocele and epididymal cysts    PLAN:  He previously had epididymal cysts on the left testicle which are likely still present.  He now has a large hydrocele on that same side.  The hydrocele is quite large and bothersome and he wishes to undergo repair.  He will first complete his course of Bactrim antibiotics for potential orchitis.  We then discussed hydrocele and epididymal cyst repair in detail along with its risks and expected recovery.  He wishes to proceed.  We will get him scheduled in the operating room as an outpatient in the near future.      Jerel Prado M.D.

## 2021-07-23 NOTE — LETTER
2021       RE: Christopher Buchanan  [address] [place] [id number]     Dear Colleague,    Thank you for referring your patient, Christopher Buchanan, to the [facility] [place] at [facility]. Please see a copy of my visit note below.    Office Visit Note  [facility]  [phone]    UROLOGIC DIAGNOSES:   Left testicular and epididymal cysts    CURRENT INTERVENTIONS:       HISTORY:   This is a 65-year-old gentleman who I saw in  in 2018 for left epididymal cysts.  He had planned to have these removed but then he said he was delayed further by the coronavirus pandemic.  He is in the emergency department last week with worsening swelling of the left testicle.  He had an ultrasound performed that showed a large left hydrocele and possible orchitis of the left testicle.  He was sent home on Levaquin but that has now been changed to Bactrim.  He has slowly gotten better on the antibiotics.  The left hemiscrotum is quite large and bothersome to him now.      PAST MEDICAL HISTORY:   Past Medical History:   Diagnosis Date     ALLERGIC RHINITIS NOS 2003     CARDIOVASCULAR SCREENING; LDL GOAL LESS THAN 160 10/31/2010     Erectile dysfunction 2011     Esophageal reflux 2003     GENERAL OSTEOARTHROSIS 2003     Hernia, abdominal      Hydrocele      Hydrocele, left 3/31/2011     Low back pain 2011     Moderate persistent asthma 3/21/2011     Mumps      Prostatism      Tuberculosis        PAST SURGICAL HISTORY:   Past Surgical History:   Procedure Laterality Date     C SPLINT      right thumb     EYE SURGERY      YAG procedure       FAMILY HISTORY:   Family History   Problem Relation Age of Onset     C.A.D. Father          at 52 of heart failure       SOCIAL HISTORY:   Social History     Socioeconomic History     Marital status:      Spouse name: Breana     Number of children: 0     Years of education: None      Highest education level: None   Occupational History     Occupation: /transportation with mentally challenged adults     Employer: [employer]   Tobacco Use     Smoking status: Never Smoker     Smokeless tobacco: Never Used   Vaping Use     Vaping Use: Never used   Substance and Sexual Activity     Alcohol use: Yes     Comment: 1 beer daily     Drug use: No     Sexual activity: Yes     Partners: Female   Other Topics Concern     Parent/sibling w/ CABG, MI or angioplasty before 65F 55M? Not Asked   Social History Narrative     None     Social Determinants of Health     Financial Resource Strain:      Difficulty of Paying Living Expenses:    Food Insecurity:      Worried About Running Out of Food in the Last Year:      Ran Out of Food in the Last Year:    Transportation Needs:      Lack of Transportation (Medical):      Lack of Transportation (Non-Medical):    Physical Activity:      Days of Exercise per Week:      Minutes of Exercise per Session:    Stress:      Feeling of Stress :    Social Connections:      Frequency of Communication with Friends and Family:      Frequency of Social Gatherings with Friends and Family:      Attends Orthodoxy Services:      Active Member of Clubs or Organizations:      Attends Club or Organization Meetings:      Marital Status:    Intimate Partner Violence:      Fear of Current or Ex-Partner:      Emotionally Abused:      Physically Abused:      Sexually Abused:        Review Of Systems:  Skin: No rash, pruritis, or skin pigmentation  Eyes: No changes in vision  Ears/Nose/Throat: No changes in hearing, no nosebleeds  Respiratory: No shortness of breath, dyspnea on exertion, cough, or hemoptysis  Cardiovascular: No chest pain or palpitations  Gastrointestinal: No diarrhea or constipation. No abdominal pain. No hematochezia  Genitourinary: see HPI  Musculoskeletal: No pain or swelling of joints, normal range of motion  Neurologic: No weakness or  "tremors  Psychiatric: No recent changes in memory or mood  Hematologic/Lymphatic/Immunologic: No easy bruising or enlarged lymph nodes  Endocrine: No weight gain or loss      PHYSICAL EXAM:    /80   Ht 1.88 m (6' 2\")   Wt 90.3 kg (199 lb)   BMI 25.55 kg/m      Constitutional: Well developed. Conversant and in no acute distress  Eyes: Anicteric sclera, conjunctiva clear, normal extraocular movements  ENT: Normocephalic and atraumatic,   Skin: Warm and dry. No rashes or lesions  Cardiac: No peripheral edema  Back/Flank: Not done  CNS/PNS: Normal musculature and movements, moves all extremities normally  Respiratory: Normal non-labored breathing  Abdomen: Soft nontender and nondistended  Peripheral Vascular: No peripheral edema  Mental Status/Psych: Alert and Oriented x 3. Normal mood and affect    Penis: Normal  Scrotal skin: Normal, no lesions  Testicles: He has a large left hydrocele present.  It is nontender to palpation.  Right testicle is normal.  Epididymis: Normal to palpation on the right side  Lymphatic: Normal inguinal lymph nodes  Digital Rectal Exam:     Cystoscopy: Not done    Imaging: None    Urinalysis: UA RESULTS:  Recent Labs   Lab Test 07/23/21  0841 07/15/21  1101   COLOR Yellow Yellow   APPEARANCE Clear Clear   URINEGLC Negative Negative   URINEBILI Small* Negative   URINEKETONE Trace* 20 *   SG >=1.030 1.023   UBLD Negative Negative   URINEPH 6.0 6.5   PROTEIN Trace* Negative   UROBILINOGEN 0.2  --    NITRITE Negative Negative   LEUKEST Negative Negative   RBCU  --  3*   WBCU  --  1       PSA: 1.48    Post Void Residual:     Other labs: None today      IMPRESSION:  Left hydrocele and epididymal cysts    PLAN:  He previously had epididymal cysts on the left testicle which are likely still present.  He now has a large hydrocele on that same side.  The hydrocele is quite large and bothersome and he wishes to undergo repair.  He will first complete his course of Bactrim antibiotics for " potential orchitis.  We then discussed hydrocele and epididymal cyst repair in detail along with its risks and expected recovery.  He wishes to proceed.  We will get him scheduled in the operating room as an outpatient in the near future.      Jerel Prado M.D.

## 2021-08-02 ENCOUNTER — MYC MEDICAL ADVICE (OUTPATIENT)
Dept: FAMILY MEDICINE | Facility: CLINIC | Age: 66
End: 2021-08-02

## 2021-08-02 NOTE — TELEPHONE ENCOUNTER
Sent [employer] response, no action needed  Alix Weaver RN, BSN  Message handled by CLINIC NURSE.

## 2021-08-02 NOTE — TELEPHONE ENCOUNTER
Will route to triage    Vannessa Sánchez/[facility]  [place]---[facility]

## 2021-08-02 NOTE — TELEPHONE ENCOUNTER
Routed FYI to Florentino Chavez MD, pt also sent [employer] message to Dr Prado, pt saw Dr Prado 7/23/21 for issue, confirm plan and if okay to have Dr Prado manage? Inform pt via [employer]  Alix Weaver RN, BSN  Message handled by CLINIC NURSE.

## 2021-08-09 ENCOUNTER — OFFICE VISIT (OUTPATIENT)
Dept: FAMILY MEDICINE | Facility: CLINIC | Age: 66
End: 2021-08-09
Payer: COMMERCIAL

## 2021-08-09 VITALS
SYSTOLIC BLOOD PRESSURE: 120 MMHG | DIASTOLIC BLOOD PRESSURE: 84 MMHG | RESPIRATION RATE: 12 BRPM | HEART RATE: 78 BPM | BODY MASS INDEX: 25.16 KG/M2 | WEIGHT: 196 LBS | TEMPERATURE: 97.9 F | OXYGEN SATURATION: 97 %

## 2021-08-09 DIAGNOSIS — Z01.818 PREOP GENERAL PHYSICAL EXAM: Primary | ICD-10-CM

## 2021-08-09 PROBLEM — J45.20 MILD INTERMITTENT ASTHMA WITHOUT COMPLICATION: Status: ACTIVE | Noted: 2017-08-10

## 2021-08-09 LAB
ALBUMIN UR-MCNC: NEGATIVE MG/DL
APPEARANCE UR: CLEAR
BILIRUB UR QL STRIP: NEGATIVE
COLOR UR AUTO: YELLOW
GLUCOSE UR STRIP-MCNC: NEGATIVE MG/DL
HGB UR QL STRIP: NEGATIVE
KETONES UR STRIP-MCNC: NEGATIVE MG/DL
LEUKOCYTE ESTERASE UR QL STRIP: NEGATIVE
NITRATE UR QL: NEGATIVE
PH UR STRIP: 6 [PH] (ref 5–7)
SP GR UR STRIP: 1.01 (ref 1–1.03)
UROBILINOGEN UR STRIP-ACNC: 0.2 E.U./DL

## 2021-08-09 PROCEDURE — 99214 OFFICE O/P EST MOD 30 MIN: CPT | Performed by: FAMILY MEDICINE

## 2021-08-09 PROCEDURE — 81003 URINALYSIS AUTO W/O SCOPE: CPT | Performed by: FAMILY MEDICINE

## 2021-08-09 NOTE — PROGRESS NOTES
[facility]  [id number] [place] [id number]  Phone: [phone]  Primary Provider: Florentino Chavez  Pre-op Performing Provider: ALYSIA BANSAL      PREOPERATIVE EVALUATION:  Today's date: 8/9/2021    Christopher Buchanan is a 65 year old male who presents for a preoperative evaluation.    Surgical Information:  Surgery/Procedure: Left hydrocelectomy scrotal approach  Surgery Location: [facility]  Surgeon: Jerel Prado  Surgery Date: 8/16/2021  Time of Surgery: 1:50pm  Where patient plans to recover: At home with family  Fax number for surgical facility: Note does not need to be faxed, will be available electronically in Epic.    Type of Anesthesia Anticipated: General    Assessment & Plan     The proposed surgical procedure is considered INTERMEDIATE risk.    Preop general physical exam  Check below labs.  - UA Macro with Reflex to Micro and Culture - lab collect; Future  - UA Macro with Reflex to Micro and Culture - lab collect         Risks and Recommendations:  The patient has the following additional risks and recommendations for perioperative complications:   - No identified additional risk factors other than previously addressed    Medication Instructions:  Patient is to take all scheduled medications on the day of surgery    RECOMMENDATION:  APPROVAL GIVEN to proceed with proposed procedure, without further diagnostic evaluation.                      Subjective     HPI related to upcoming procedure: Left hydrocelectomy scrotal approach    Preop Questions 8/6/2021   1. Have you ever had a heart attack or stroke? No   2. Have you ever had surgery on your heart or blood vessels, such as a stent placement, a coronary artery bypass, or surgery on an artery in your head, neck, heart, or legs? No   3. Do you have chest pain with activity? No   4. Do you have a history of  heart failure? No   5. Do you currently have a cold, bronchitis or symptoms of other  infection? No   6. Do you have a cough, shortness of breath, or wheezing? No   7. Do you or anyone in your family have previous history of blood clots? YES - mother.   8. Do you or does anyone in your family have a serious bleeding problem such as prolonged bleeding following surgeries or cuts? No   9. Have you ever had problems with anemia or been told to take iron pills? No   10. Have you had any abnormal blood loss such as black, tarry or bloody stools? No   11. Have you ever had a blood transfusion? No   12. Are you willing to have a blood transfusion if it is medically needed before, during, or after your surgery? Yes   13. Have you or any of your relatives ever had problems with anesthesia? No   14. Do you have sleep apnea, excessive snoring or daytime drowsiness? No   15. Do you have any artifical heart valves or other implanted medical devices like a pacemaker, defibrillator, or continuous glucose monitor? No   16. Do you have artificial joints? No   17. Are you allergic to latex? YES: pt is allergic to latex.       Health Care Directive:  Patient does not have a Health Care Directive or Living Will:     Preoperative Review of :   reviewed - controlled substances prescribed by other outside provider(s).      Status of Chronic Conditions:  See problem list for active medical problems.  Problems all longstanding and stable, except as noted/documented.  See ROS for pertinent symptoms related to these conditions.      Review of Systems  CONSTITUTIONAL: NEGATIVE for fever, chills, change in weight  INTEGUMENTARY/SKIN: NEGATIVE for worrisome rashes, moles or lesions  EYES: NEGATIVE for vision changes or irritation  ENT/MOUTH: NEGATIVE for ear, mouth and throat problems  RESP: NEGATIVE for significant cough or SOB  CV: NEGATIVE for chest pain, palpitations or peripheral edema  GI: NEGATIVE for nausea, abdominal pain, heartburn, or change in bowel habits  : NEGATIVE for frequency, dysuria, or  hematuria  MUSCULOSKELETAL:chronic low back pain  NEURO: NEGATIVE for weakness, dizziness or paresthesias  ENDOCRINE: NEGATIVE for temperature intolerance, skin/hair changes  HEME: NEGATIVE for bleeding problems  PSYCHIATRIC: NEGATIVE for changes in mood or affect    Patient Active Problem List    Diagnosis Date Noted     Paroxysmal supraventricular tachycardia (H) 12/02/2016     Priority: Medium     Hip joint painful on movement, unspecified laterality 02/18/2016     Priority: Medium     Other chronic pain 08/31/2015     Priority: Medium     Patient is followed by JESSICA RIVERA for ongoing prescription of pain medication.  All refills should be approved by this provider, or covering partner.    Medication(s): HYDROcodone-acetaminophen (NORCO) 5-325 MG per tablet.   Maximum quantity per month: 30  Clinic visit frequency required: Q 4 months     Controlled substance agreement on file: Yes       Date(s): 8/31/15    Pain Clinic evaluation in the past: No    DIRE Total Score(s):  No flowsheet data found.    Last [facility] website verification: done 5.26.17   [email or web address]         Right low back pain, with sciatica presence unspecified 07/10/2015     Priority: Medium     Erectile dysfunction 09/30/2011     Priority: Medium     Hydrocele, left 03/31/2011     Priority: Medium     Prostatism      Priority: Medium     Moderate persistent asthma 03/21/2011     Priority: Medium     CARDIOVASCULAR SCREENING; LDL GOAL LESS THAN 160 10/31/2010     Priority: Medium     Allergic rhinitis 05/08/2003     Priority: Medium     Problem list name updated by automated process. Provider to review       Generalized osteoarthrosis, unspecified site 05/08/2003     Priority: Medium     Esophageal reflux 05/08/2003     Priority: Medium      Past Medical History:   Diagnosis Date     ALLERGIC RHINITIS NOS 5/8/2003     CARDIOVASCULAR SCREENING; LDL GOAL LESS THAN 160 10/31/2010     Erectile dysfunction 9/30/2011     Esophageal  reflux 5/8/2003     GENERAL OSTEOARTHROSIS 5/8/2003     Hernia, abdominal      Hydrocele      Hydrocele, left 3/31/2011     Low back pain 9/30/2011     Moderate persistent asthma 3/21/2011     Mumps      Prostatism      Tuberculosis      Past Surgical History:   Procedure Laterality Date     C SPLINT      right thumb     EYE SURGERY      YAG procedure     Current Outpatient Medications   Medication Sig Dispense Refill     albuterol (PROAIR HFA) 108 (90 Base) MCG/ACT inhaler Inhale 1-2 puffs into the lungs every 4 hours as needed for shortness of breath / dyspnea 18 g 1     clindamycin (CLEOCIN) 150 MG capsule Take 1 capsule (150 mg) by mouth 4 times daily 40 capsule 0     cyclobenzaprine (FLEXERIL) 5 MG tablet Take 1 tablet (5 mg) by mouth 3 times daily as needed for muscle spasms 90 tablet 1     finasteride (PROPECIA) 1 MG tablet Take 1 tablet (1 mg) by mouth daily 90 tablet 1     finasteride (PROPECIA) 1 MG tablet Take 1 tablet (1 mg) by mouth daily 90 tablet 3     fluticasone (FLONASE) 50 MCG/ACT nasal spray SHAKE WELL AND USE 2 SPRAYS IN EACH NOSTRIL DAILY 16 mL 3     HYDROcodone-acetaminophen (NORCO) 5-325 MG tablet Take 1 tablet by mouth every 6 hours as needed for moderate to severe pain 45 tablet 0     omeprazole (PRILOSEC) 20 MG DR capsule Take 1 capsule (20 mg) by mouth daily 30 capsule 11     tadalafil (CIALIS) 5 MG tablet Take 1 tablet (5 mg) by mouth every 24 hours 60 tablet 0     zolpidem (AMBIEN) 10 MG tablet Take 1 tablet (10 mg) by mouth nightly as needed for sleep 30 tablet 0       Allergies   Allergen Reactions     Gabapentin Palpitations and Shortness Of Breath     Tizanidine Shortness Of Breath     Latex      Other reaction(s): *Unknown  Swelling - localized to eye after eye surger     Pcn [Penicillins]      Noted in 4/21/08 ER, as possible        Social History     Tobacco Use     Smoking status: Never Smoker     Smokeless tobacco: Never Used   Substance Use Topics     Alcohol use: Yes      Comment: 1 beer daily       History   Drug Use No         Objective     There were no vitals taken for this visit.    Physical Exam    GENERAL APPEARANCE: healthy, alert and no distress     EYES: EOMI,  PERRL     HENT: ear canals and TM's normal and nose and mouth without ulcers or lesions     NECK: no adenopathy, no asymmetry, masses, or scars and thyroid normal to palpation     RESP: lungs clear to auscultation - no rales, rhonchi or wheezes     CV: regular rates and rhythm, normal S1 S2, no S3 or S4 and no murmur, click or rub     ABDOMEN:  soft, nontender, no HSM or masses and bowel sounds normal     MS: extremities normal- no gross deformities noted, no evidence of inflammation in joints, FROM in all extremities.     SKIN: no suspicious lesions or rashes     NEURO: Normal strength and tone, sensory exam grossly normal, mentation intact and speech normal     PSYCH: mentation appears normal. and affect normal/bright     LYMPHATICS: No cervical adenopathy    Recent Labs   Lab Test 07/15/21  0916 12/09/20  0723   HGB 15.5  --      --     136   POTASSIUM 3.8 3.8   CR 0.89 0.87        Diagnostics:  Recent Results (from the past 24 hour(s))   UA Macro with Reflex to Micro and Culture - lab collect    Collection Time: 08/09/21  8:17 AM    Specimen: Urine, Midstream   Result Value Ref Range    Color Urine Yellow Colorless, Straw, Light Yellow, Yellow    Appearance Urine Clear Clear    Glucose Urine Negative Negative mg/dL    Bilirubin Urine Negative Negative    Ketones Urine Negative Negative mg/dL    Specific Gravity Urine 1.015 1.003 - 1.035    Blood Urine Negative Negative    pH Urine 6.0 5.0 - 7.0    Protein Albumin Urine Negative Negative mg/dL    Urobilinogen Urine 0.2 0.2, 1.0 E.U./dL    Nitrite Urine Negative Negative    Leukocyte Esterase Urine Negative Negative      No EKG required, no history of coronary heart disease, significant arrhythmia, peripheral arterial disease or other structural heart  disease.    Revised Cardiac Risk Index (RCRI):  The patient has the following serious cardiovascular risks for perioperative complications:   - No serious cardiac risks = 0 points     RCRI Interpretation: 0 points: Class I (very low risk - 0.4% complication rate)           Signed Electronically by: Radha Rosas MD  Copy of this evaluation report is provided to requesting physician.

## 2021-08-09 NOTE — PATIENT INSTRUCTIONS

## 2021-08-13 ENCOUNTER — LAB (OUTPATIENT)
Dept: LAB | Facility: CLINIC | Age: 66
End: 2021-08-13
Attending: UROLOGY
Payer: COMMERCIAL

## 2021-08-13 DIAGNOSIS — Z11.59 ENCOUNTER FOR SCREENING FOR OTHER VIRAL DISEASES: ICD-10-CM

## 2021-08-13 PROCEDURE — U0005 INFEC AGEN DETEC AMPLI PROBE: HCPCS

## 2021-08-13 PROCEDURE — U0003 INFECTIOUS AGENT DETECTION BY NUCLEIC ACID (DNA OR RNA); SEVERE ACUTE RESPIRATORY SYNDROME CORONAVIRUS 2 (SARS-COV-2) (CORONAVIRUS DISEASE [COVID-19]), AMPLIFIED PROBE TECHNIQUE, MAKING USE OF HIGH THROUGHPUT TECHNOLOGIES AS DESCRIBED BY CMS-2020-01-R: HCPCS

## 2021-08-13 RX ORDER — ACETAMINOPHEN 325 MG/1
325-650 TABLET ORAL EVERY 6 HOURS PRN
COMMUNITY

## 2021-08-14 LAB — SARS-COV-2 RNA RESP QL NAA+PROBE: NEGATIVE

## 2021-08-16 ENCOUNTER — HOSPITAL ENCOUNTER (OUTPATIENT)
Facility: CLINIC | Age: 66
Discharge: HOME | End: 2021-08-16
Attending: UROLOGY | Admitting: UROLOGY
Payer: COMMERCIAL

## 2021-08-16 ENCOUNTER — ANESTHESIA (OUTPATIENT)
Dept: SURGERY | Facility: CLINIC | Age: 66
End: 2021-08-16
Payer: COMMERCIAL

## 2021-08-16 ENCOUNTER — ANESTHESIA EVENT (OUTPATIENT)
Dept: SURGERY | Facility: CLINIC | Age: 66
End: 2021-08-16
Payer: COMMERCIAL

## 2021-08-16 ENCOUNTER — TELEPHONE (OUTPATIENT)
Dept: UROLOGY | Facility: CLINIC | Age: 66
End: 2021-08-16

## 2021-08-16 VITALS
BODY MASS INDEX: 24.9 KG/M2 | OXYGEN SATURATION: 94 % | RESPIRATION RATE: 16 BRPM | DIASTOLIC BLOOD PRESSURE: 91 MMHG | SYSTOLIC BLOOD PRESSURE: 134 MMHG | HEART RATE: 71 BPM | WEIGHT: 194 LBS | HEIGHT: 74 IN | TEMPERATURE: 98.8 F

## 2021-08-16 DIAGNOSIS — N43.3 HYDROCELE, UNSPECIFIED HYDROCELE TYPE: ICD-10-CM

## 2021-08-16 PROCEDURE — 250N000009 HC RX 250: Performed by: UROLOGY

## 2021-08-16 PROCEDURE — 360N000075 HC SURGERY LEVEL 2, PER MIN: Performed by: UROLOGY

## 2021-08-16 PROCEDURE — 272N000001 HC OR GENERAL SUPPLY STERILE: Performed by: UROLOGY

## 2021-08-16 PROCEDURE — 88331 PATH CONSLTJ SURG 1 BLK 1SPC: CPT | Mod: TC | Performed by: UROLOGY

## 2021-08-16 PROCEDURE — 55060 REPAIR OF HYDROCELE: CPT | Performed by: UROLOGY

## 2021-08-16 PROCEDURE — 54830 REMOVE EPIDIDYMIS LESION: CPT | Mod: 51 | Performed by: UROLOGY

## 2021-08-16 PROCEDURE — 710N000012 HC RECOVERY PHASE 2, PER MINUTE: Performed by: UROLOGY

## 2021-08-16 PROCEDURE — 258N000003 HC RX IP 258 OP 636: Performed by: NURSE ANESTHETIST, CERTIFIED REGISTERED

## 2021-08-16 PROCEDURE — 250N000013 HC RX MED GY IP 250 OP 250 PS 637: Performed by: ANESTHESIOLOGY

## 2021-08-16 PROCEDURE — 250N000011 HC RX IP 250 OP 636: Performed by: ANESTHESIOLOGY

## 2021-08-16 PROCEDURE — 370N000017 HC ANESTHESIA TECHNICAL FEE, PER MIN: Performed by: UROLOGY

## 2021-08-16 PROCEDURE — 250N000011 HC RX IP 250 OP 636: Performed by: NURSE ANESTHETIST, CERTIFIED REGISTERED

## 2021-08-16 PROCEDURE — 250N000009 HC RX 250: Performed by: NURSE ANESTHETIST, CERTIFIED REGISTERED

## 2021-08-16 PROCEDURE — 258N000003 HC RX IP 258 OP 636: Performed by: ANESTHESIOLOGY

## 2021-08-16 PROCEDURE — 999N000141 HC STATISTIC PRE-PROCEDURE NURSING ASSESSMENT: Performed by: UROLOGY

## 2021-08-16 PROCEDURE — 710N000009 HC RECOVERY PHASE 1, LEVEL 1, PER MIN: Performed by: UROLOGY

## 2021-08-16 PROCEDURE — 250N000011 HC RX IP 250 OP 636: Performed by: UROLOGY

## 2021-08-16 RX ORDER — ONDANSETRON 2 MG/ML
4 INJECTION INTRAMUSCULAR; INTRAVENOUS EVERY 30 MIN PRN
Status: DISCONTINUED | OUTPATIENT
Start: 2021-08-16 | End: 2021-08-16 | Stop reason: HOSPADM

## 2021-08-16 RX ORDER — ONDANSETRON 2 MG/ML
INJECTION INTRAMUSCULAR; INTRAVENOUS PRN
Status: DISCONTINUED | OUTPATIENT
Start: 2021-08-16 | End: 2021-08-16

## 2021-08-16 RX ORDER — MEPERIDINE HYDROCHLORIDE 25 MG/ML
12.5 INJECTION INTRAMUSCULAR; INTRAVENOUS; SUBCUTANEOUS
Status: DISCONTINUED | OUTPATIENT
Start: 2021-08-16 | End: 2021-08-16 | Stop reason: HOSPADM

## 2021-08-16 RX ORDER — SODIUM CHLORIDE, SODIUM LACTATE, POTASSIUM CHLORIDE, CALCIUM CHLORIDE 600; 310; 30; 20 MG/100ML; MG/100ML; MG/100ML; MG/100ML
INJECTION, SOLUTION INTRAVENOUS CONTINUOUS
Status: DISCONTINUED | OUTPATIENT
Start: 2021-08-16 | End: 2021-08-16 | Stop reason: HOSPADM

## 2021-08-16 RX ORDER — HYDROMORPHONE HCL IN WATER/PF 6 MG/30 ML
0.2 PATIENT CONTROLLED ANALGESIA SYRINGE INTRAVENOUS EVERY 5 MIN PRN
Status: DISCONTINUED | OUTPATIENT
Start: 2021-08-16 | End: 2021-08-16 | Stop reason: HOSPADM

## 2021-08-16 RX ORDER — CEFAZOLIN SODIUM 2 G/100ML
2 INJECTION, SOLUTION INTRAVENOUS
Status: COMPLETED | OUTPATIENT
Start: 2021-08-16 | End: 2021-08-16

## 2021-08-16 RX ORDER — KETOROLAC TROMETHAMINE 15 MG/ML
15 INJECTION, SOLUTION INTRAMUSCULAR; INTRAVENOUS EVERY 6 HOURS PRN
Status: DISCONTINUED | OUTPATIENT
Start: 2021-08-16 | End: 2021-08-16 | Stop reason: HOSPADM

## 2021-08-16 RX ORDER — MAGNESIUM HYDROXIDE 1200 MG/15ML
LIQUID ORAL PRN
Status: DISCONTINUED | OUTPATIENT
Start: 2021-08-16 | End: 2021-08-16 | Stop reason: HOSPADM

## 2021-08-16 RX ORDER — HYDRALAZINE HYDROCHLORIDE 20 MG/ML
2.5-5 INJECTION INTRAMUSCULAR; INTRAVENOUS EVERY 10 MIN PRN
Status: DISCONTINUED | OUTPATIENT
Start: 2021-08-16 | End: 2021-08-16 | Stop reason: HOSPADM

## 2021-08-16 RX ORDER — FENTANYL CITRATE 50 UG/ML
50 INJECTION, SOLUTION INTRAMUSCULAR; INTRAVENOUS
Status: DISCONTINUED | OUTPATIENT
Start: 2021-08-16 | End: 2021-08-16 | Stop reason: HOSPADM

## 2021-08-16 RX ORDER — OXYCODONE HYDROCHLORIDE 5 MG/1
5 TABLET ORAL EVERY 4 HOURS PRN
Status: DISCONTINUED | OUTPATIENT
Start: 2021-08-16 | End: 2021-08-16 | Stop reason: HOSPADM

## 2021-08-16 RX ORDER — LIDOCAINE 40 MG/G
CREAM TOPICAL
Status: DISCONTINUED | OUTPATIENT
Start: 2021-08-16 | End: 2021-08-16 | Stop reason: HOSPADM

## 2021-08-16 RX ORDER — BUPIVACAINE HYDROCHLORIDE 5 MG/ML
INJECTION, SOLUTION EPIDURAL; INTRACAUDAL PRN
Status: DISCONTINUED | OUTPATIENT
Start: 2021-08-16 | End: 2021-08-16 | Stop reason: HOSPADM

## 2021-08-16 RX ORDER — CEPHALEXIN 500 MG/1
500 CAPSULE ORAL 2 TIMES DAILY
Qty: 6 CAPSULE | Refills: 0 | Status: SHIPPED | OUTPATIENT
Start: 2021-08-16 | End: 2022-06-01

## 2021-08-16 RX ORDER — ONDANSETRON 4 MG/1
4 TABLET, ORALLY DISINTEGRATING ORAL EVERY 30 MIN PRN
Status: DISCONTINUED | OUTPATIENT
Start: 2021-08-16 | End: 2021-08-16 | Stop reason: HOSPADM

## 2021-08-16 RX ORDER — FENTANYL CITRATE 50 UG/ML
25 INJECTION, SOLUTION INTRAMUSCULAR; INTRAVENOUS EVERY 5 MIN PRN
Status: DISCONTINUED | OUTPATIENT
Start: 2021-08-16 | End: 2021-08-16 | Stop reason: HOSPADM

## 2021-08-16 RX ORDER — METOPROLOL TARTRATE 1 MG/ML
1-2 INJECTION, SOLUTION INTRAVENOUS EVERY 5 MIN PRN
Status: DISCONTINUED | OUTPATIENT
Start: 2021-08-16 | End: 2021-08-16 | Stop reason: HOSPADM

## 2021-08-16 RX ORDER — BACITRACIN ZINC 500 [USP'U]/G
OINTMENT TOPICAL PRN
Status: DISCONTINUED | OUTPATIENT
Start: 2021-08-16 | End: 2021-08-16 | Stop reason: HOSPADM

## 2021-08-16 RX ORDER — ALBUTEROL SULFATE 0.83 MG/ML
2.5 SOLUTION RESPIRATORY (INHALATION) EVERY 4 HOURS PRN
Status: DISCONTINUED | OUTPATIENT
Start: 2021-08-16 | End: 2021-08-16 | Stop reason: HOSPADM

## 2021-08-16 RX ORDER — DEXAMETHASONE SODIUM PHOSPHATE 4 MG/ML
INJECTION, SOLUTION INTRA-ARTICULAR; INTRALESIONAL; INTRAMUSCULAR; INTRAVENOUS; SOFT TISSUE PRN
Status: DISCONTINUED | OUTPATIENT
Start: 2021-08-16 | End: 2021-08-16

## 2021-08-16 RX ORDER — TRAMADOL HYDROCHLORIDE 50 MG/1
50 TABLET ORAL EVERY 6 HOURS PRN
Qty: 10 TABLET | Refills: 0 | Status: SHIPPED | OUTPATIENT
Start: 2021-08-16 | End: 2021-08-19

## 2021-08-16 RX ORDER — ACETAMINOPHEN 325 MG/1
975 TABLET ORAL ONCE
Status: COMPLETED | OUTPATIENT
Start: 2021-08-16 | End: 2021-08-16

## 2021-08-16 RX ORDER — CEFAZOLIN SODIUM 2 G/100ML
2 INJECTION, SOLUTION INTRAVENOUS SEE ADMIN INSTRUCTIONS
Status: DISCONTINUED | OUTPATIENT
Start: 2021-08-16 | End: 2021-08-16 | Stop reason: HOSPADM

## 2021-08-16 RX ORDER — PROPOFOL 10 MG/ML
INJECTION, EMULSION INTRAVENOUS CONTINUOUS PRN
Status: DISCONTINUED | OUTPATIENT
Start: 2021-08-16 | End: 2021-08-16

## 2021-08-16 RX ORDER — LIDOCAINE HYDROCHLORIDE 10 MG/ML
INJECTION, SOLUTION INFILTRATION; PERINEURAL PRN
Status: DISCONTINUED | OUTPATIENT
Start: 2021-08-16 | End: 2021-08-16

## 2021-08-16 RX ORDER — GLYCOPYRROLATE 0.2 MG/ML
INJECTION, SOLUTION INTRAMUSCULAR; INTRAVENOUS PRN
Status: DISCONTINUED | OUTPATIENT
Start: 2021-08-16 | End: 2021-08-16

## 2021-08-16 RX ORDER — FENTANYL CITRATE 50 UG/ML
INJECTION, SOLUTION INTRAMUSCULAR; INTRAVENOUS PRN
Status: DISCONTINUED | OUTPATIENT
Start: 2021-08-16 | End: 2021-08-16

## 2021-08-16 RX ORDER — PROPOFOL 10 MG/ML
INJECTION, EMULSION INTRAVENOUS PRN
Status: DISCONTINUED | OUTPATIENT
Start: 2021-08-16 | End: 2021-08-16

## 2021-08-16 RX ADMIN — LIDOCAINE HYDROCHLORIDE 50 MG: 10 INJECTION, SOLUTION INFILTRATION; PERINEURAL at 13:49

## 2021-08-16 RX ADMIN — DEXAMETHASONE SODIUM PHOSPHATE 4 MG: 4 INJECTION, SOLUTION INTRA-ARTICULAR; INTRALESIONAL; INTRAMUSCULAR; INTRAVENOUS; SOFT TISSUE at 13:44

## 2021-08-16 RX ADMIN — ACETAMINOPHEN 975 MG: 325 TABLET, FILM COATED ORAL at 16:31

## 2021-08-16 RX ADMIN — ONDANSETRON HYDROCHLORIDE 4 MG: 2 INJECTION, SOLUTION INTRAVENOUS at 14:06

## 2021-08-16 RX ADMIN — CEFAZOLIN SODIUM 2 G: 2 INJECTION, SOLUTION INTRAVENOUS at 13:43

## 2021-08-16 RX ADMIN — SODIUM CHLORIDE, POTASSIUM CHLORIDE, SODIUM LACTATE AND CALCIUM CHLORIDE: 600; 310; 30; 20 INJECTION, SOLUTION INTRAVENOUS at 13:36

## 2021-08-16 RX ADMIN — MIDAZOLAM 2 MG: 1 INJECTION INTRAMUSCULAR; INTRAVENOUS at 13:43

## 2021-08-16 RX ADMIN — FENTANYL CITRATE 100 MCG: 50 INJECTION, SOLUTION INTRAMUSCULAR; INTRAVENOUS at 13:49

## 2021-08-16 RX ADMIN — OXYCODONE HYDROCHLORIDE 5 MG: 5 TABLET ORAL at 15:48

## 2021-08-16 RX ADMIN — FENTANYL CITRATE 25 MCG: 50 INJECTION, SOLUTION INTRAMUSCULAR; INTRAVENOUS at 16:07

## 2021-08-16 RX ADMIN — FENTANYL CITRATE 25 MCG: 50 INJECTION, SOLUTION INTRAMUSCULAR; INTRAVENOUS at 15:58

## 2021-08-16 RX ADMIN — PROPOFOL 75 MCG/KG/MIN: 10 INJECTION, EMULSION INTRAVENOUS at 14:09

## 2021-08-16 RX ADMIN — ONDANSETRON 4 MG: 2 INJECTION INTRAMUSCULAR; INTRAVENOUS at 17:54

## 2021-08-16 RX ADMIN — GLYCOPYRROLATE 0.2 MG: 0.2 INJECTION, SOLUTION INTRAMUSCULAR; INTRAVENOUS at 13:44

## 2021-08-16 RX ADMIN — SODIUM CHLORIDE, POTASSIUM CHLORIDE, SODIUM LACTATE AND CALCIUM CHLORIDE: 600; 310; 30; 20 INJECTION, SOLUTION INTRAVENOUS at 15:06

## 2021-08-16 RX ADMIN — FENTANYL CITRATE 100 MCG: 50 INJECTION, SOLUTION INTRAMUSCULAR; INTRAVENOUS at 15:04

## 2021-08-16 RX ADMIN — FENTANYL CITRATE 25 MCG: 50 INJECTION, SOLUTION INTRAMUSCULAR; INTRAVENOUS at 15:44

## 2021-08-16 RX ADMIN — PROPOFOL 200 MG: 10 INJECTION, EMULSION INTRAVENOUS at 13:49

## 2021-08-16 RX ADMIN — FENTANYL CITRATE 25 MCG: 50 INJECTION, SOLUTION INTRAMUSCULAR; INTRAVENOUS at 15:51

## 2021-08-16 RX ADMIN — PHENYLEPHRINE HYDROCHLORIDE 100 MCG: 10 INJECTION INTRAVENOUS at 13:55

## 2021-08-16 ASSESSMENT — MIFFLIN-ST. JEOR: SCORE: 1724.75

## 2021-08-16 NOTE — ANESTHESIA PREPROCEDURE EVALUATION
Anesthesia Pre-Procedure Evaluation    Patient: Christopher Buchanan   MRN: [id number] : 1955        Preoperative Diagnosis: Hydrocele, unspecified hydrocele type [N43.3]   Procedure : Procedure(s):  Left hydrocelectomy scrotal approach     Past Medical History:   Diagnosis Date     ALLERGIC RHINITIS NOS 2003     CARDIOVASCULAR SCREENING; LDL GOAL LESS THAN 160 10/31/2010     Erectile dysfunction 2011     Esophageal reflux 2003     GENERAL OSTEOARTHROSIS 2003     Hernia, abdominal      Hydrocele      Hydrocele, left 3/31/2011     Low back pain 2011     Moderate persistent asthma 3/21/2011     Mumps      Prostatism      Tuberculosis       Past Surgical History:   Procedure Laterality Date     BACK SURGERY  2018    C6-C7 Fusion     C SPLINT  1975    right thumb     EYE SURGERY      YAG procedure      Allergies   Allergen Reactions     Gabapentin Palpitations and Shortness Of Breath     Tizanidine Shortness Of Breath     Latex      Other reaction(s): *Unknown  Swelling - localized to eye after eye surger     Pcn [Penicillins]      Noted in 08 ER, as possible      Social History     Tobacco Use     Smoking status: Never Smoker     Smokeless tobacco: Never Used   Substance Use Topics     Alcohol use: Yes     Comment: 1 beer daily      Wt Readings from Last 1 Encounters:   21 88 kg (194 lb 0.1 oz)        Anesthesia Evaluation   Pt has had prior anesthetic.         ROS/MED HX  ENT/Pulmonary:     (+) Moderate Persistent, asthma  (-) sleep apnea   Neurologic:       Cardiovascular:  - neg cardiovascular ROS     METS/Exercise Tolerance:     Hematologic:       Musculoskeletal:   (+) arthritis,     GI/Hepatic:     (+) GERD,     Renal/Genitourinary:       Endo:       Psychiatric/Substance Use:       Infectious Disease:       Malignancy:       Other:            Physical Exam    Airway         TM distance: > 3 FB   Neck ROM: full     Respiratory Devices and Support         Dental            Cardiovascular          Rhythm and rate: regular and normal     Pulmonary           breath sounds clear to auscultation           OUTSIDE LABS:  CBC:   Lab Results   Component Value Date    WBC 14.9 (H) 07/15/2021    WBC 8.4 01/08/2018    HGB 15.5 07/15/2021    HGB 15.2 01/08/2018    HCT 47.5 07/15/2021    HCT 44.6 01/08/2018     07/15/2021     01/08/2018     BMP:   Lab Results   Component Value Date     07/15/2021     12/09/2020    POTASSIUM 3.8 07/15/2021    POTASSIUM 3.8 12/09/2020    CHLORIDE 107 07/15/2021    CHLORIDE 103 12/09/2020    CO2 28 07/15/2021    CO2 29 12/09/2020    BUN 14 07/15/2021    BUN 12 12/09/2020    CR 0.89 07/15/2021    CR 0.87 12/09/2020     (H) 07/15/2021    GLC 96 12/09/2020     COAGS:   Lab Results   Component Value Date    INR 0.93 12/10/2015     POC: No results found for: BGM, HCG, HCGS  HEPATIC:   Lab Results   Component Value Date    ALBUMIN 3.8 12/09/2020    PROTTOTAL 7.0 12/09/2020    ALT 27 12/09/2020    AST 18 12/09/2020    ALKPHOS 65 12/09/2020    BILITOTAL 0.8 12/09/2020     OTHER:   Lab Results   Component Value Date    A1C 5.2 12/10/2015    BRANDON 8.9 07/15/2021    PHOS 3.4 11/25/2003    TSH 2.50 12/10/2015    SED 3 08/14/2008       Anesthesia Plan    ASA Status:  2   NPO Status:  NPO Appropriate    Anesthesia Type: General.     - Airway: LMA   Induction: Intravenous.   Maintenance: Balanced.        Consents    Anesthesia Plan(s) and associated risks, benefits, and realistic alternatives discussed. Questions answered and patient/representative(s) expressed understanding.     - Discussed with:  Patient         Postoperative Care    Pain management: IV analgesics, Oral pain medications, Multi-modal analgesia.   PONV prophylaxis: Ondansetron (or other 5HT-3)     Comments:                Yoav Mckeon MD

## 2021-08-16 NOTE — TELEPHONE ENCOUNTER
Patient called nurse line and LM. He would like to know how much drainage he will have after surgery and if he will need some kind of pad. Will send message to MD. Neyda Sadler LPN

## 2021-08-16 NOTE — OR NURSING
Patients latex allergy ws noted. Dr Prado requesting scrotal support strap at end of case which has latex in product. Patient was questioned about the extent of his latex allergy by OMAR KLELEY. He stated that he has never had a reaction to any latex elastic and has even worn latex gloves with out reaction. Consensus between patient and RN is that its ok to proceed with the use of scrotal support strap, will monitor for issues.

## 2021-08-16 NOTE — ANESTHESIA POSTPROCEDURE EVALUATION
Patient: Christopher Buchanan    Procedure(s):  Left hydrocelectomy scrotal approach    Diagnosis:Hydrocele, unspecified hydrocele type [N43.3]  Diagnosis Additional Information: No value filed.    Anesthesia Type:  General    Note:  Disposition: Outpatient   Postop Pain Control: Uneventful            Sign Out: Well controlled pain   PONV: No   Neuro/Psych: Uneventful            Sign Out: Acceptable/Baseline neuro status   Airway/Respiratory: Uneventful            Sign Out: Acceptable/Baseline resp. status   CV/Hemodynamics: Uneventful            Sign Out: Acceptable CV status; No obvious hypovolemia; No obvious fluid overload   Other NRE: NONE   DID A NON-ROUTINE EVENT OCCUR? No           Last vitals:  Vitals Value Taken Time   /81 08/16/21 1555   Temp     Pulse 85 08/16/21 1600   Resp 6 08/16/21 1600   SpO2 96 % 08/16/21 1600   Vitals shown include unvalidated device data.    Electronically Signed By: Yoav Mckeon MD  August 16, 2021  4:01 PM

## 2021-08-16 NOTE — DISCHARGE INSTRUCTIONS
GENERAL ANESTHESIA OR SEDATION ADULT DISCHARGE INSTRUCTIONS   SPECIAL PRECAUTIONS FOR 24 HOURS AFTER SURGERY    IT IS NOT UNUSUAL TO FEEL LIGHT-HEADED OR FAINT, UP TO 24 HOURS AFTER SURGERY OR WHILE TAKING PAIN MEDICATION.  IF YOU HAVE THESE SYMPTOMS; SIT FOR A FEW MINUTES BEFORE STANDING AND HAVE SOMEONE ASSIST YOU WHEN YOU GET UP TO WALK OR USE THE BATHROOM.    YOU SHOULD REST AND RELAX FOR THE NEXT 24 HOURS AND YOU MUST MAKE ARRANGEMENTS TO HAVE SOMEONE STAY WITH YOU FOR AT LEAST 24 HOURS AFTER YOUR DISCHARGE.  AVOID HAZARDOUS AND STRENUOUS ACTIVITIES.  DO NOT MAKE IMPORTANT DECISIONS FOR 24 HOURS.    DO NOT DRIVE ANY VEHICLE OR OPERATE MECHANICAL EQUIPMENT FOR 24 HOURS FOLLOWING THE END OF YOUR SURGERY.  EVEN THOUGH YOU MAY FEEL NORMAL, YOUR REACTIONS MAY BE AFFECTED BY THE MEDICATION YOU HAVE RECEIVED.    DO NOT DRINK ALCOHOLIC BEVERAGES FOR 24 HOURS FOLLOWING YOUR SURGERY.    DRINK CLEAR LIQUIDS (APPLE JUICE, GINGER ALE, 7-UP, BROTH, ETC.).  PROGRESS TO YOUR REGULAR DIET AS YOU FEEL ABLE.    YOU MAY HAVE A DRY MOUTH, A SORE THROAT, MUSCLES ACHES OR TROUBLE SLEEPING.  THESE SHOULD GO AWAY AFTER 24 HOURS.    CALL YOUR DOCTOR FOR ANY OF THE FOLLOWING:  SIGNS OF INFECTION (FEVER, GROWING TENDERNESS AT THE SURGERY SITE, A LARGE AMOUNT OF DRAINAGE OR BLEEDING, SEVERE PAIN, FOUL-SMELLING DRAINAGE, REDNESS OR SWELLING.    IT HAS BEEN OVER 8 TO 10 HOURS SINCE SURGERY AND YOU ARE STILL NOT ABLE TO URINATE (PASS WATER)      DR. VEENA EDOUARD M.D.  CLINIC PHONE NUMBER:  [phone]      [facility] UROLOGY        HOME CARE FOLLOWING MINOR SURGERY        DRESSING  Keep dressing dry and in place until your doctor instructs you to remove the dressing.    DRAINAGE  There should be minimal drainage. If bleeding should occur and soaks through the dressing apply a sterile, dry dressing over it and tape in place. If bleeding persists, apply gentle, steady pressure with your hand over the dressing for 5 minutes. If the  bleeding does not stop, call your doctor.    SKIN CLOSURE  You may have stitches or special skin closures. You will be given an appointment for the removal of any external stitches. You may have stitches under the skin which will absorb. You may have steri-strips over the incision. These look like thin tapes and will peel off in 5-7 days. If they don t after 7 days, you may carefully remove them. You may shower with the steri-strips but do not soak them, as with swimming or taking a bath. A protective covering of plastic may be placed over external stitches for showering.    NOTIFY YOUR PHYSICIAN IF YOU HAVE ANY OF THE FOLLOWING SYMPTOMS:    1. Fever greater than 101 degrees  2. Excessive bleeding or drainage  3. Disruption of the skin closure  4. Swelling, redness or excessive tenderness at the site  5. Severe pain  6. Drainage that is green, yellow, thick white or has a bad odor  7.   Maximum acetaminophen (Tylenol) dose from all sources should not exceed 4 grams (4000 mg) per day. You receieved 975mg at 4:30pm.

## 2021-08-16 NOTE — ANESTHESIA CARE TRANSFER NOTE
Patient: Christopher Buchanan    Procedure(s):  Left hydrocelectomy scrotal approach    Diagnosis: Hydrocele, unspecified hydrocele type [N43.3]  Diagnosis Additional Information: No value filed.    Anesthesia Type:   General     Note:    Oropharynx: oral airway in place  Level of Consciousness: drowsy  Oxygen Supplementation: face mask  Level of Supplemental Oxygen (L/min / FiO2): 6  Independent Airway: airway patency satisfactory and stable  Dentition: dentition unchanged  Vital Signs Stable: post-procedure vital signs reviewed and stable  Report to RN Given: handoff report given  Patient transferred to: PACU    Handoff Report: Identifed the Patient, Identified the Reponsible Provider, Reviewed the pertinent medical history, Discussed the surgical course, Reviewed Intra-OP anesthesia mangement and issues during anesthesia, Set expectations for post-procedure period and Allowed opportunity for questions and acknowledgement of understanding      Vitals:  Vitals Value Taken Time   BP     Temp     Pulse 82 08/16/21 1524   Resp 11 08/16/21 1524   SpO2 98 % 08/16/21 1524   Vitals shown include unvalidated device data.    Electronically Signed By: JUNI Gaston CRNA  August 16, 2021  3:25 PM

## 2021-08-17 LAB
ATRIAL RATE - MUSE: 73 BPM
DIASTOLIC BLOOD PRESSURE - MUSE: NORMAL MMHG
INTERPRETATION ECG - MUSE: NORMAL
P AXIS - MUSE: 77 DEGREES
PR INTERVAL - MUSE: 154 MS
QRS DURATION - MUSE: 70 MS
QT - MUSE: 390 MS
QTC - MUSE: 429 MS
R AXIS - MUSE: 52 DEGREES
SYSTOLIC BLOOD PRESSURE - MUSE: NORMAL MMHG
T AXIS - MUSE: 60 DEGREES
VENTRICULAR RATE- MUSE: 73 BPM

## 2021-08-17 NOTE — OP NOTE
Procedure Date: 08/16/2021    ATTENDING SURGEON:  Jerel Prado MD    PREOPERATIVE DIAGNOSIS:  Left hydrocele and left epididymal cyst.    POSTOPERATIVE DIAGNOSIS:  Left hydrocele and left epididymal cyst.    PROCEDURE PERFORMED:  Left hydrocele repair, removal of left epididymal cyst.    ANESTHESIA:  General.    COMPLICATIONS:  None.    SPECIMENS:  Frozen section of inflamed left epididymal cyst sent during surgery.  A small amount of left hydrocele sac tissue sent for permanent return.     HISTORY OF PRESENT ILLNESS:  Christopher Buchanan is a 65-year-old gentleman with a longstanding history of left epididymal cyst.  He has recently developed a hydrocele as well.  Now presents for removal.    PROCEDURE IN DETAIL:  Risks and benefits of the procedure were explained in detail to the patient, and informed consent was obtained.  The patient was brought to the operating room and placed supine on the table under general endotracheal anesthetic.  The scrotum was then shaved and prepped and draped in standard sterile fashion.    After appropriate timeout, I brought to the left hydrocele up to the median raphe of the scrotum.  I instilled Marcaine local anesthetic along the median raphe of the scrotum.  I used a 15 blade scalpel to make an incision.  I then used Bovie electrocautery to dissect down through the layers of the testicle and ultimately deliver the hydrocele through the incision.  The hydrocele was concluded at its surrounding tissues.  I incised the hydrocele anteriorly and eventually drained out a very small amount of fluid.  I opened the hydrocele sac anteriorly and it was seen that the bulk of the fluid in the left hemiscrotum was made up of a large epididymal cyst.  The epididymal cyst had a very thickened wall.  I carefully enucleated out the left epididymal cyst.  Because it had such an unusual appearance, I did send it for frozen sectioning and frozen section revealed an epidermal cyst with inflammatory and  granulation tissue, but no evidence of malignancy.  I used Bovie electrocautery to achieve hemostasis.  I sent a small amount of hydrocele sac for analysis, but there was a very minimal amount of hydrocele sac tissue present.  I performed a cord block with Marcaine local anesthetic.  I placed the left testicle back into the left hemiscrotum.  Once proper hemostasis had been achieved, I irrigated out the left hemiscrotum thoroughly.  I placed a Penrose drain inferiorly into the left hemiscrotum and tacked this in with a 2-0 silk suture.  The dartos layer of the scrotum was closed with a running 0 Vicryl suture.  Skin was closed with 3-0 chromic sutures.  The wound was cleaned and dried and covered with bacitracin and fluff gauze.  This was held in by scrotal support.  The procedure was concluded at this point.    The patient tolerated the procedure without complications.  He went post-anesthetic care unit in good condition.  He will go home from there.    Jerel Prado MD        D: 2021   T: 2021   MT: garrett    Name:     ROMEL GANN  MRN:      [id number]        Account:        [id number]   :      1955           Procedure Date: 2021     Document: [id number]

## 2021-08-18 ENCOUNTER — OFFICE VISIT (OUTPATIENT)
Dept: UROLOGY | Facility: CLINIC | Age: 66
End: 2021-08-18
Payer: COMMERCIAL

## 2021-08-18 VITALS
HEIGHT: 74 IN | BODY MASS INDEX: 24.77 KG/M2 | DIASTOLIC BLOOD PRESSURE: 62 MMHG | WEIGHT: 193 LBS | SYSTOLIC BLOOD PRESSURE: 102 MMHG

## 2021-08-18 DIAGNOSIS — N43.3 HYDROCELE, LEFT: Primary | ICD-10-CM

## 2021-08-18 DIAGNOSIS — N43.40 SPERMATOCELE: ICD-10-CM

## 2021-08-18 LAB
PATH REPORT.COMMENTS IMP SPEC: NORMAL
PATH REPORT.COMMENTS IMP SPEC: NORMAL
PATH REPORT.FINAL DX SPEC: NORMAL
PATH REPORT.GROSS SPEC: NORMAL
PATH REPORT.INTRAOP OBS SPEC DOC: NORMAL
PATH REPORT.MICROSCOPIC SPEC OTHER STN: NORMAL
PHOTO IMAGE: NORMAL

## 2021-08-18 PROCEDURE — 88302 TISSUE EXAM BY PATHOLOGIST: CPT | Mod: 26 | Performed by: PATHOLOGY

## 2021-08-18 PROCEDURE — 99024 POSTOP FOLLOW-UP VISIT: CPT | Performed by: PHYSICIAN ASSISTANT

## 2021-08-18 PROCEDURE — 88331 PATH CONSLTJ SURG 1 BLK 1SPC: CPT | Mod: 26 | Performed by: PATHOLOGY

## 2021-08-18 PROCEDURE — 88304 TISSUE EXAM BY PATHOLOGIST: CPT | Mod: 26 | Performed by: PATHOLOGY

## 2021-08-18 ASSESSMENT — MIFFLIN-ST. JEOR: SCORE: 1720.19

## 2021-08-18 ASSESSMENT — PAIN SCALES - GENERAL: PAINLEVEL: MODERATE PAIN (4)

## 2021-08-18 NOTE — NURSING NOTE
Chief Complaint   Patient presents with     follow up for drain removal     Doing well   Arlene Cox LPN

## 2021-08-18 NOTE — LETTER
8/18/2021       RE: Christopher Buchanan  [address] [place] [id number]     Dear Colleague,    Thank you for referring your patient, Christopher Buchanan, to the [facility]OLOGY CLINIC [place] at [facility]. Please see a copy of my visit note below.    Subjective      CHIEF COMPLAINT/REASON FOR VISIT   Post operative vist     HISTORY OF PRESENT ILLNESS   Mr. Buchanan is a pleasant 65-year-old gentleman, who underwent left hydrocele repair and removal of the left epididymal cyst with Dr. Prado on 08/16/2021..  Frozen section was done of the left epididymal cyst secondary to the unusual appearance.  It showed evidence of inflammation and no evidence of malignancy.  Penrose drain was placed.    Patient notes that he is doing as well as can be expected.  No significant constitutional complaints at this time.    The following portions of the patient's history were reviewed and updated as appropriate: allergies, current medications, past family history, past medical history, past social history, past surgical history, and problem list.     REVIEW OF SYSTEMS   Review of Systems   Per HPI.     Patient Active Problem List   Diagnosis     Allergic rhinitis     Generalized osteoarthrosis, unspecified site     Gastroesophageal reflux disease without esophagitis     Hydrocele, left     Erectile dysfunction     Low back pain     Chronic bilateral low back pain with left-sided sciatica     Paroxysmal supraventricular tachycardia (H)     Mild intermittent asthma without complication      Past Medical History:   Diagnosis Date     ALLERGIC RHINITIS NOS 5/8/2003     CARDIOVASCULAR SCREENING; LDL GOAL LESS THAN 160 10/31/2010     Erectile dysfunction 9/30/2011     Esophageal reflux 5/8/2003     GENERAL OSTEOARTHROSIS 5/8/2003     Hernia, abdominal      Hydrocele      Hydrocele, left 3/31/2011     Low back pain 9/30/2011     Moderate persistent asthma 3/21/2011     Mumps       "Prostatism      Tuberculosis       Objective      PHYSICAL EXAM   /62   Ht 1.88 m (6' 2\")   Wt 87.5 kg (193 lb)   BMI 24.78 kg/m     Physical Exam  Constitutional:       Appearance: Normal appearance.   HENT:      Head: Normocephalic.      Nose: Nose normal.   Pulmonary:      Effort: Pulmonary effort is normal.   Genitourinary:     Comments: Sutures in the scrotum: Clean, dry, intact.  Penrose drain draining bloody seroanguinous fluid.  Neurological:      General: No focal deficit present.      Mental Status: He is alert and oriented to person, place, and time.   Psychiatric:         Mood and Affect: Mood normal.         Behavior: Behavior normal.       LABORATORY     Lab Results   Component Value Date    PSA 1.48 12/09/2020    PSA 3.77 09/11/2019      Recent Labs   Lab Test 08/09/21  0817 07/15/21  1101   COLOR Yellow Yellow   APPEARANCE Clear Clear   URINEGLC Negative Negative   URINEBILI Negative Negative   URINEKETONE Negative 20 *   SG 1.015 1.023   UBLD Negative Negative   URINEPH 6.0 6.5   PROTEIN Negative Negative   UROBILINOGEN 0.2  --    NITRITE Negative Negative   LEUKEST Negative Negative   RBCU  --  3*   WBCU  --  1     Preliminary pathology from epididymal cyst shows wall with mesothelial peripheralization, favored reactive, cyst with organized inflammation.    Suture clipped for Penrose drain.  Easily removed.  The small amount of drainage.  Patient had this area again covered with fluffy gauze and a small piece of paper tape.  Scrotal support was applied.  Patient encouraged to continue with gauze changes and scrotal support.  We also discussed notes submerging this open area in water.  Patient can shower and try to keep this area as dry as possible.  This will close up on its own.    Assessment & Plan    1. Hydrocele, left    2. Spermatocele      I had the pleasure today of meeting with Mr. Buchanan to discuss is recent hydrocele ectomy and spermatocele ectomy.  He is due to have his Penrose " drain removed today.  This was removed with ease.  Patient was encouraged to continue with gauze changes and scrotal support.  Also discussed not submerging this area in water.    Reviewed post surgical recommendations.  All questions answered at this time.    We briefly discussed the preliminary pathology noted at the time of his procedure.  We discussed that the definitive pathology has not been completed.  Dr. Prado will contact him when this has returned.    Signed by:       Xi Bonner PA-C 8/18/2021

## 2021-08-18 NOTE — PROGRESS NOTES
"Subjective      CHIEF COMPLAINT/REASON FOR VISIT   Post operative vist     HISTORY OF PRESENT ILLNESS   Mr. Buchanan is a pleasant 65-year-old gentleman, who underwent left hydrocele repair and removal of the left epididymal cyst with Dr. Prado on 08/16/2021..  Frozen section was done of the left epididymal cyst secondary to the unusual appearance.  It showed evidence of inflammation and no evidence of malignancy.  Penrose drain was placed.    Patient notes that he is doing as well as can be expected.  No significant constitutional complaints at this time.    The following portions of the patient's history were reviewed and updated as appropriate: allergies, current medications, past family history, past medical history, past social history, past surgical history, and problem list.     REVIEW OF SYSTEMS   Review of Systems   Per HPI.     Patient Active Problem List   Diagnosis     Allergic rhinitis     Generalized osteoarthrosis, unspecified site     Gastroesophageal reflux disease without esophagitis     Hydrocele, left     Erectile dysfunction     Low back pain     Chronic bilateral low back pain with left-sided sciatica     Paroxysmal supraventricular tachycardia (H)     Mild intermittent asthma without complication      Past Medical History:   Diagnosis Date     ALLERGIC RHINITIS NOS 5/8/2003     CARDIOVASCULAR SCREENING; LDL GOAL LESS THAN 160 10/31/2010     Erectile dysfunction 9/30/2011     Esophageal reflux 5/8/2003     GENERAL OSTEOARTHROSIS 5/8/2003     Hernia, abdominal      Hydrocele      Hydrocele, left 3/31/2011     Low back pain 9/30/2011     Moderate persistent asthma 3/21/2011     Mumps      Prostatism      Tuberculosis       Objective      PHYSICAL EXAM   /62   Ht 1.88 m (6' 2\")   Wt 87.5 kg (193 lb)   BMI 24.78 kg/m     Physical Exam  Constitutional:       Appearance: Normal appearance.   HENT:      Head: Normocephalic.      Nose: Nose normal.   Pulmonary:      Effort: Pulmonary effort is " normal.   Genitourinary:     Comments: Sutures in the scrotum: Clean, dry, intact.  Penrose drain draining bloody seroanguinous fluid.  Neurological:      General: No focal deficit present.      Mental Status: He is alert and oriented to person, place, and time.   Psychiatric:         Mood and Affect: Mood normal.         Behavior: Behavior normal.       LABORATORY     Lab Results   Component Value Date    PSA 1.48 12/09/2020    PSA 3.77 09/11/2019      Recent Labs   Lab Test 08/09/21  0817 07/15/21  1101   COLOR Yellow Yellow   APPEARANCE Clear Clear   URINEGLC Negative Negative   URINEBILI Negative Negative   URINEKETONE Negative 20 *   SG 1.015 1.023   UBLD Negative Negative   URINEPH 6.0 6.5   PROTEIN Negative Negative   UROBILINOGEN 0.2  --    NITRITE Negative Negative   LEUKEST Negative Negative   RBCU  --  3*   WBCU  --  1     Preliminary pathology from epididymal cyst shows wall with mesothelial peripheralization, favored reactive, cyst with organized inflammation.    Suture clipped for Penrose drain.  Easily removed.  The small amount of drainage.  Patient had this area again covered with fluffy gauze and a small piece of paper tape.  Scrotal support was applied.  Patient encouraged to continue with gauze changes and scrotal support.  We also discussed notes submerging this open area in water.  Patient can shower and try to keep this area as dry as possible.  This will close up on its own.    Assessment & Plan    1. Hydrocele, left    2. Spermatocele      I had the pleasure today of meeting with Mr. Buchanan to discuss is recent hydrocele ectomy and spermatocele ectomy.  He is due to have his Penrose drain removed today.  This was removed with ease.  Patient was encouraged to continue with gauze changes and scrotal support.  Also discussed not submerging this area in water.    Reviewed post surgical recommendations.  All questions answered at this time.    We briefly discussed the preliminary pathology noted at  the time of his procedure.  We discussed that the definitive pathology has not been completed.  Dr. Prado will contact him when this has returned.    Signed by:       Xi Bonner PA-C 8/18/2021

## 2021-09-02 ENCOUNTER — DOCUMENTATION ONLY (OUTPATIENT)
Dept: OTHER | Facility: CLINIC | Age: 66
End: 2021-09-02

## 2021-09-04 ENCOUNTER — HEALTH MAINTENANCE LETTER (OUTPATIENT)
Age: 66
End: 2021-09-04

## 2021-10-13 ENCOUNTER — MYC MEDICAL ADVICE (OUTPATIENT)
Dept: FAMILY MEDICINE | Facility: CLINIC | Age: 66
End: 2021-10-13

## 2021-10-23 ENCOUNTER — IMMUNIZATION (OUTPATIENT)
Dept: FAMILY MEDICINE | Facility: CLINIC | Age: 66
End: 2021-10-23
Payer: COMMERCIAL

## 2021-10-23 DIAGNOSIS — Z23 NEED FOR PROPHYLACTIC VACCINATION AND INOCULATION AGAINST INFLUENZA: Primary | ICD-10-CM

## 2021-10-23 PROCEDURE — 90471 IMMUNIZATION ADMIN: CPT

## 2021-10-23 PROCEDURE — 90662 IIV NO PRSV INCREASED AG IM: CPT

## 2021-10-23 PROCEDURE — 99207 PR NO CHARGE NURSE ONLY: CPT

## 2021-12-09 ENCOUNTER — OFFICE VISIT (OUTPATIENT)
Dept: FAMILY MEDICINE | Facility: CLINIC | Age: 66
End: 2021-12-09
Payer: COMMERCIAL

## 2021-12-09 VITALS
DIASTOLIC BLOOD PRESSURE: 76 MMHG | SYSTOLIC BLOOD PRESSURE: 129 MMHG | HEART RATE: 72 BPM | HEIGHT: 74 IN | WEIGHT: 202.7 LBS | BODY MASS INDEX: 26.01 KG/M2 | OXYGEN SATURATION: 98 %

## 2021-12-09 DIAGNOSIS — M54.40 LUMBAGO OF LUMBAR REGION WITH SCIATICA: ICD-10-CM

## 2021-12-09 DIAGNOSIS — Z12.11 SCREEN FOR COLON CANCER: Primary | ICD-10-CM

## 2021-12-09 DIAGNOSIS — E78.5 HYPERLIPIDEMIA LDL GOAL <100: ICD-10-CM

## 2021-12-09 DIAGNOSIS — J45.41: ICD-10-CM

## 2021-12-09 DIAGNOSIS — Z12.5 SCREENING FOR PROSTATE CANCER: ICD-10-CM

## 2021-12-09 DIAGNOSIS — G47.9 SLEEP DISORDER: ICD-10-CM

## 2021-12-09 LAB — CREAT UR-MCNC: 27 MG/DL

## 2021-12-09 PROCEDURE — 96127 BRIEF EMOTIONAL/BEHAV ASSMT: CPT | Performed by: FAMILY MEDICINE

## 2021-12-09 PROCEDURE — 36415 COLL VENOUS BLD VENIPUNCTURE: CPT | Performed by: FAMILY MEDICINE

## 2021-12-09 PROCEDURE — 80061 LIPID PANEL: CPT | Performed by: FAMILY MEDICINE

## 2021-12-09 PROCEDURE — 99214 OFFICE O/P EST MOD 30 MIN: CPT | Performed by: FAMILY MEDICINE

## 2021-12-09 PROCEDURE — 80307 DRUG TEST PRSMV CHEM ANLYZR: CPT | Performed by: FAMILY MEDICINE

## 2021-12-09 PROCEDURE — G0103 PSA SCREENING: HCPCS | Performed by: FAMILY MEDICINE

## 2021-12-09 PROCEDURE — 80053 COMPREHEN METABOLIC PANEL: CPT | Performed by: FAMILY MEDICINE

## 2021-12-09 RX ORDER — ZOLPIDEM TARTRATE 10 MG/1
TABLET ORAL
Qty: 30 TABLET | Refills: 3 | Status: SHIPPED | OUTPATIENT
Start: 2021-12-09 | End: 2022-10-19

## 2021-12-09 RX ORDER — HYDROCODONE BITARTRATE AND ACETAMINOPHEN 5; 325 MG/1; MG/1
1 TABLET ORAL EVERY 6 HOURS PRN
Qty: 45 TABLET | Refills: 0 | Status: SHIPPED | OUTPATIENT
Start: 2021-12-09 | End: 2022-06-01

## 2021-12-09 RX ORDER — ALBUTEROL SULFATE 90 UG/1
1-2 AEROSOL, METERED RESPIRATORY (INHALATION) EVERY 4 HOURS PRN
Qty: 18 G | Refills: 1 | Status: SHIPPED | OUTPATIENT
Start: 2021-12-09 | End: 2022-06-01

## 2021-12-09 ASSESSMENT — ANXIETY QUESTIONNAIRES
7. FEELING AFRAID AS IF SOMETHING AWFUL MIGHT HAPPEN: NOT AT ALL
GAD7 TOTAL SCORE: 0
2. NOT BEING ABLE TO STOP OR CONTROL WORRYING: NOT AT ALL
6. BECOMING EASILY ANNOYED OR IRRITABLE: NOT AT ALL
1. FEELING NERVOUS, ANXIOUS, OR ON EDGE: NOT AT ALL
GAD7 TOTAL SCORE: 0
3. WORRYING TOO MUCH ABOUT DIFFERENT THINGS: NOT AT ALL
7. FEELING AFRAID AS IF SOMETHING AWFUL MIGHT HAPPEN: NOT AT ALL
GAD7 TOTAL SCORE: 0
5. BEING SO RESTLESS THAT IT IS HARD TO SIT STILL: NOT AT ALL
4. TROUBLE RELAXING: NOT AT ALL

## 2021-12-09 ASSESSMENT — MIFFLIN-ST. JEOR: SCORE: 1759.19

## 2021-12-09 NOTE — PROGRESS NOTES
"      Linette White is a 66 year old who presents for the following health issues see history of scrotal abcess and surgery this year     History of Present Illness       He eats 2-3 servings of fruits and vegetables daily.He consumes 0 sweetened beverage(s) daily.He exercises with enough effort to increase his heart rate 30 to 60 minutes per day.  He exercises with enough effort to increase his heart rate 6 days per week.   He is taking medications regularly.       Chronic/Recurring Back Pain Follow Up multilevel , chronic,       Where is your back pain located? (Select all that apply) low back bilateral, neck right and shoulders right    How would you describe your back pain?  sharp and stabbing    Where does your back pain spread? nowhere    Since your last clinic visit for back pain, how has your pain changed? always present, but gets better and worse    Does your back pain interfere with your job? Not applicable    Since your last visit, have you tried any new treatment? No        Review of Systems   Review of systems shows no problems with vision,hearing or learning  No heart murmer, asthma, bowel or bladder problems, rashes, back or muscle problems,dental problems, headaches,balance or frequent infections        Objective    /76 (BP Location: Right arm, Patient Position: Sitting, Cuff Size: Adult Large)   Pulse 72   Ht 1.88 m (6' 2\")   Wt 91.9 kg (202 lb 11.2 oz)   SpO2 98%   BMI 26.03 kg/m    Body mass index is 26.03 kg/m .  Physical Exam   GENERAL: healthy, alert and no distress  EYES: Eyes grossly normal to inspection, PERRL and conjunctivae and sclerae normal  HENT: ear canals and TM's normal, nose and mouth without ulcers or lesions  NECK: no adenopathy, no asymmetry, masses, or scars and thyroid normal to palpation  RESP: lungs clear to auscultation - no rales, rhonchi or wheezes  CV: regular rate and rhythm, normal S1 S2, no S3 or S4, no murmur, click or rub, no peripheral edema and " peripheral pulses strong  ABDOMEN: soft, nontender, no hepatosplenomegaly, no masses and bowel sounds normal  MS: no gross musculoskeletal defects noted, no edema  SKIN: no suspicious lesions or rashes  NEURO: Normal strength and tone, mentation intact and speech normal  PSYCH: mentation appears normal, affect normal/bright    (Z12.11) Screen for colon cancer  (primary encounter diagnosis)  Comment:   Plan: Adult Gastro Ref - Procedure Only            (M54.40) Lumbago of lumbar region with sciatica  Comment:   Plan: HYDROcodone-acetaminophen (NORCO) 5-325 MG         tablet, Drug Confirmation Panel Urine with         Creat - lab collect        Infrequent use, flareups once to twice per month     (G47.9) Sleep disorder  Comment: for travel and occasional difficulty  Plan: zolpidem (AMBIEN) 10 MG tablet, Comprehensive         metabolic panel (BMP + Alb, Alk Phos, ALT, AST,        Total. Bili, TP)            (J45.41) Moderate persistent asthma with allergic rhinitis without status asthmaticus with acute exacerbation  Comment:   Plan: albuterol (PROAIR HFA) 108 (90 Base) MCG/ACT         inhaler        Occasional bronchospasm    (E78.5) Hyperlipidemia LDL goal <100  Comment:   Plan: Lipid panel reflex to direct LDL Fasting            (Z12.5) Screening for prostate cancer  Comment:   Plan: PSA, screen                        Answers for HPI/ROS submitted by the patient on 12/9/2021  If you checked off any problems, how difficult have these problems made it for you to do your work, take care of things at home, or get along with other people?: Not difficult at all  PHQ9 TOTAL SCORE: 0  JUDITH 7 TOTAL SCORE: 0

## 2021-12-10 LAB
ALBUMIN SERPL-MCNC: 3.6 G/DL (ref 3.4–5)
ALP SERPL-CCNC: 59 U/L (ref 40–150)
ALT SERPL W P-5'-P-CCNC: 25 U/L (ref 0–70)
ANION GAP SERPL CALCULATED.3IONS-SCNC: 5 MMOL/L (ref 3–14)
AST SERPL W P-5'-P-CCNC: 14 U/L (ref 0–45)
BILIRUB SERPL-MCNC: 0.8 MG/DL (ref 0.2–1.3)
BUN SERPL-MCNC: 13 MG/DL (ref 7–30)
CALCIUM SERPL-MCNC: 8.7 MG/DL (ref 8.5–10.1)
CHLORIDE BLD-SCNC: 104 MMOL/L (ref 94–109)
CO2 SERPL-SCNC: 26 MMOL/L (ref 20–32)
CREAT SERPL-MCNC: 0.77 MG/DL (ref 0.66–1.25)
GFR SERPL CREATININE-BSD FRML MDRD: >90 ML/MIN/1.73M2
GLUCOSE BLD-MCNC: 99 MG/DL (ref 70–99)
POTASSIUM BLD-SCNC: 4 MMOL/L (ref 3.4–5.3)
PROT SERPL-MCNC: 6.9 G/DL (ref 6.8–8.8)
PSA SERPL-MCNC: 1.07 UG/L (ref 0–4)
SODIUM SERPL-SCNC: 135 MMOL/L (ref 133–144)

## 2021-12-10 ASSESSMENT — ANXIETY QUESTIONNAIRES: GAD7 TOTAL SCORE: 0

## 2021-12-10 ASSESSMENT — PATIENT HEALTH QUESTIONNAIRE - PHQ9: SUM OF ALL RESPONSES TO PHQ QUESTIONS 1-9: 0

## 2021-12-16 LAB
CHOLEST SERPL-MCNC: 186 MG/DL
FASTING STATUS PATIENT QL REPORTED: YES
HDLC SERPL-MCNC: 72 MG/DL
LDLC SERPL CALC-MCNC: 100 MG/DL
NONHDLC SERPL-MCNC: 114 MG/DL
TRIGL SERPL-MCNC: 71 MG/DL

## 2021-12-27 ENCOUNTER — OFFICE VISIT (OUTPATIENT)
Dept: UROLOGY | Facility: CLINIC | Age: 66
End: 2021-12-27
Payer: COMMERCIAL

## 2021-12-27 VITALS
OXYGEN SATURATION: 97 % | BODY MASS INDEX: 25.67 KG/M2 | DIASTOLIC BLOOD PRESSURE: 70 MMHG | HEIGHT: 74 IN | WEIGHT: 200 LBS | HEART RATE: 73 BPM | SYSTOLIC BLOOD PRESSURE: 118 MMHG

## 2021-12-27 DIAGNOSIS — N43.3 HYDROCELE IN ADULT: Primary | ICD-10-CM

## 2021-12-27 PROCEDURE — 99213 OFFICE O/P EST LOW 20 MIN: CPT | Performed by: UROLOGY

## 2021-12-27 ASSESSMENT — PAIN SCALES - GENERAL: PAINLEVEL: NO PAIN (0)

## 2021-12-27 ASSESSMENT — MIFFLIN-ST. JEOR: SCORE: 1746.94

## 2021-12-27 NOTE — NURSING NOTE
Chief Complaint   Patient presents with     Hydrocele, left     Pt here for exam     Pt complains of occasional sharp pain and small lump on left testicle, painful to touch    Cassandra Stuart CMA

## 2021-12-27 NOTE — PROGRESS NOTES
Office Visit Note  [facility]  [phone]    UROLOGIC DIAGNOSES:   Left hydrocele and left epididymal cysts    CURRENT INTERVENTIONS:   Removal in August    HISTORY:   Christopher returns to clinic today for urologic follow-up.  He reports feeling well since his surgery.      PAST MEDICAL HISTORY:   Past Medical History:   Diagnosis Date     ALLERGIC RHINITIS NOS 2003     CARDIOVASCULAR SCREENING; LDL GOAL LESS THAN 160 10/31/2010     Erectile dysfunction 2011     Esophageal reflux 2003     GENERAL OSTEOARTHROSIS 2003     Hernia, abdominal      Hydrocele      Hydrocele, left 3/31/2011     Low back pain 2011     Moderate persistent asthma 3/21/2011     Mumps      Prostatism      Tuberculosis        PAST SURGICAL HISTORY:   Past Surgical History:   Procedure Laterality Date     BACK SURGERY      C6-C7 Fusion     EYE SURGERY      YAG procedure     HYDROCELECTOMY SCROTAL Left 2021    Procedure: Left hydrocelectomy scrotal approach;  Surgeon: Jerel Prado MD;  Location:  OR     [facility]  [facility]    right thumb       FAMILY HISTORY:   Family History   Problem Relation Age of Onset     C.A.D. Father          at 52 of heart failure       SOCIAL HISTORY:   Social History     Socioeconomic History     Marital status:      Spouse name: Breana     Number of children: 0     Years of education: None     Highest education level: None   Occupational History     Occupation: /transportation with mentally challenged adults     Employer: [employer]   Tobacco Use     Smoking status: Never Smoker     Smokeless tobacco: Never Used   Vaping Use     Vaping Use: Never used   Substance and Sexual Activity     Alcohol use: Yes     Comment: 1 beer daily     Drug use: No     Sexual activity: Yes     Partners: Female   Other Topics Concern     Parent/sibling w/ CABG, MI or angioplasty before 65F 55M? Not Asked   Social History Narrative     None     Social  "Determinants of Health     Financial Resource Strain: Not on file   Food Insecurity: Not on file   Transportation Needs: Not on file   Physical Activity: Not on file   Stress: Not on file   Social Connections: Not on file   Intimate Partner Violence: Not on file   Housing Stability: Not on file       Review Of Systems:  Skin: No rash, pruritis, or skin pigmentation  Eyes: No changes in vision  Ears/Nose/Throat: No changes in hearing, no nosebleeds  Respiratory: No shortness of breath, dyspnea on exertion, cough, or hemoptysis  Cardiovascular: No chest pain or palpitations  Gastrointestinal: No diarrhea or constipation. No abdominal pain. No hematochezia  Genitourinary: see HPI  Musculoskeletal: No pain or swelling of joints, normal range of motion  Neurologic: No weakness or tremors  Psychiatric: No recent changes in memory or mood  Hematologic/Lymphatic/Immunologic: No easy bruising or enlarged lymph nodes  Endocrine: No weight gain or loss      PHYSICAL EXAM:    /70   Pulse 73   Ht 1.88 m (6' 2\")   Wt 90.7 kg (200 lb)   SpO2 97%   BMI 25.68 kg/m      Constitutional: Well developed. Conversant and in no acute distress  Eyes: Anicteric sclera, conjunctiva clear, normal extraocular movements  ENT: Normocephalic and atraumatic,   Skin: Warm and dry. No rashes or lesions  Cardiac: No peripheral edema  Back/Flank: Not done  CNS/PNS: Normal musculature and movements, moves all extremities normally  Respiratory: Normal non-labored breathing  Abdomen: Soft nontender and nondistended  Peripheral Vascular: No peripheral edema  Mental Status/Psych: Alert and Oriented x 3. Normal mood and affect    Penis: Normal  Scrotal skin: Normal, no lesions.  The incision is healed well.  Testicles: Normal to palpation bilaterally.  No evidence of hydrocele or epididymal cyst today.  Epididymis: Normal to palpation bilaterally  Lymphatic: Normal inguinal lymph nodes  Digital Rectal Exam:     Cystoscopy: Not done    Imaging: " None    Urinalysis: UA RESULTS:  Recent Labs   Lab Test 08/09/21  0817 07/23/21  0841 07/15/21  1101   COLOR Yellow   < > Yellow   APPEARANCE Clear   < > Clear   URINEGLC Negative   < > Negative   URINEBILI Negative   < > Negative   URINEKETONE Negative   < > 20 *   SG 1.015   < > 1.023   UBLD Negative   < > Negative   URINEPH 6.0   < > 6.5   PROTEIN Negative   < > Negative   UROBILINOGEN 0.2   < >  --    NITRITE Negative   < > Negative   LEUKEST Negative   < > Negative   RBCU  --   --  3*   WBCU  --   --  1    < > = values in this interval not displayed.       PSA: 1.07    Post Void Residual:     Other labs: None today      IMPRESSION:  Doing well    PLAN:  He is doing very well after surgery to remove a left hydrocele and left epididymal cyst.  No evidence of hydrocele or epididymal cyst on exam today.  He will follow-up with me as needed in the future.      Jerel Prado M.D.

## 2021-12-27 NOTE — LETTER
2021       RE: Christopher Buchanan  [address] [facility] [address]     Dear Colleague,    Thank you for referring your patient, Christopher Buchanan, to the [facility] UROLOGY CLINIC [place] at [facility]. Please see a copy of my visit note below.    Office Visit Note  [facility]  [phone]    UROLOGIC DIAGNOSES:   Left hydrocele and left epididymal cysts    CURRENT INTERVENTIONS:   Removal in August    HISTORY:   Christopher returns to clinic today for urologic follow-up.  He reports feeling well since his surgery.      PAST MEDICAL HISTORY:   Past Medical History:   Diagnosis Date     ALLERGIC RHINITIS NOS 2003     CARDIOVASCULAR SCREENING; LDL GOAL LESS THAN 160 10/31/2010     Erectile dysfunction 2011     Esophageal reflux 2003     GENERAL OSTEOARTHROSIS 2003     Hernia, abdominal      Hydrocele      Hydrocele, left 3/31/2011     Low back pain 2011     Moderate persistent asthma 3/21/2011     Mumps      Prostatism      Tuberculosis        PAST SURGICAL HISTORY:   Past Surgical History:   Procedure Laterality Date     BACK SURGERY  2018    C6-C7 Fusion     EYE SURGERY      YAG procedure     HYDROCELECTOMY SCROTAL Left 2021    Procedure: Left hydrocelectomy scrotal approach;  Surgeon: Jerel Prado MD;  Location:  OR     [facility] SPLINT  1975    right thumb       FAMILY HISTORY:   Family History   Problem Relation Age of Onset     C.A.D. Father          at 52 of heart failure       SOCIAL HISTORY:   Social History     Socioeconomic History     Marital status:      Spouse name: Breana     Number of children: 0     Years of education: None     Highest education level: None   Occupational History     Occupation: /transportation with mentally challenged adults     Employer: [employer]   Tobacco Use     Smoking status: Never Smoker     Smokeless tobacco: Never Used   Vaping Use      "Vaping Use: Never used   Substance and Sexual Activity     Alcohol use: Yes     Comment: 1 beer daily     Drug use: No     Sexual activity: Yes     Partners: Female   Other Topics Concern     Parent/sibling w/ CABG, MI or angioplasty before 65F 55M? Not Asked   Social History Narrative     None     Social Determinants of Health     Financial Resource Strain: Not on file   Food Insecurity: Not on file   Transportation Needs: Not on file   Physical Activity: Not on file   Stress: Not on file   Social Connections: Not on file   Intimate Partner Violence: Not on file   Housing Stability: Not on file       Review Of Systems:  Skin: No rash, pruritis, or skin pigmentation  Eyes: No changes in vision  Ears/Nose/Throat: No changes in hearing, no nosebleeds  Respiratory: No shortness of breath, dyspnea on exertion, cough, or hemoptysis  Cardiovascular: No chest pain or palpitations  Gastrointestinal: No diarrhea or constipation. No abdominal pain. No hematochezia  Genitourinary: see HPI  Musculoskeletal: No pain or swelling of joints, normal range of motion  Neurologic: No weakness or tremors  Psychiatric: No recent changes in memory or mood  Hematologic/Lymphatic/Immunologic: No easy bruising or enlarged lymph nodes  Endocrine: No weight gain or loss      PHYSICAL EXAM:    /70   Pulse 73   Ht 1.88 m (6' 2\")   Wt 90.7 kg (200 lb)   SpO2 97%   BMI 25.68 kg/m      Constitutional: Well developed. Conversant and in no acute distress  Eyes: Anicteric sclera, conjunctiva clear, normal extraocular movements  ENT: Normocephalic and atraumatic,   Skin: Warm and dry. No rashes or lesions  Cardiac: No peripheral edema  Back/Flank: Not done  CNS/PNS: Normal musculature and movements, moves all extremities normally  Respiratory: Normal non-labored breathing  Abdomen: Soft nontender and nondistended  Peripheral Vascular: No peripheral edema  Mental Status/Psych: Alert and Oriented x 3. Normal mood and affect    Penis: " Normal  Scrotal skin: Normal, no lesions.  The incision is healed well.  Testicles: Normal to palpation bilaterally.  No evidence of hydrocele or epididymal cyst today.  Epididymis: Normal to palpation bilaterally  Lymphatic: Normal inguinal lymph nodes  Digital Rectal Exam:     Cystoscopy: Not done    Imaging: None    Urinalysis: UA RESULTS:  Recent Labs   Lab Test 08/09/21  0817 07/23/21  0841 07/15/21  1101   COLOR Yellow   < > Yellow   APPEARANCE Clear   < > Clear   URINEGLC Negative   < > Negative   URINEBILI Negative   < > Negative   URINEKETONE Negative   < > 20 *   SG 1.015   < > 1.023   UBLD Negative   < > Negative   URINEPH 6.0   < > 6.5   PROTEIN Negative   < > Negative   UROBILINOGEN 0.2   < >  --    NITRITE Negative   < > Negative   LEUKEST Negative   < > Negative   RBCU  --   --  3*   WBCU  --   --  1    < > = values in this interval not displayed.       PSA: 1.07    Post Void Residual:     Other labs: None today      IMPRESSION:  Doing well    PLAN:  He is doing very well after surgery to remove a left hydrocele and left epididymal cyst.  No evidence of hydrocele or epididymal cyst on exam today.  He will follow-up with me as needed in the future.      Jerel Prado M.D.

## 2022-01-11 DIAGNOSIS — L65.9 ALOPECIA: ICD-10-CM

## 2022-01-11 DIAGNOSIS — N52.9 ERECTILE DYSFUNCTION, UNSPECIFIED ERECTILE DYSFUNCTION TYPE: ICD-10-CM

## 2022-01-13 RX ORDER — FINASTERIDE 1 MG/1
TABLET, FILM COATED ORAL
Qty: 90 TABLET | Refills: 1 | Status: SHIPPED | OUTPATIENT
Start: 2022-01-13 | End: 2022-04-15

## 2022-01-13 RX ORDER — TADALAFIL 5 MG/1
TABLET ORAL
Qty: 60 TABLET | Refills: 0 | Status: SHIPPED | OUTPATIENT
Start: 2022-01-13 | End: 2023-03-02

## 2022-01-13 NOTE — TELEPHONE ENCOUNTER
Routing refill request to provider for review/approval because:  Please review.  Thanks    Violeta SCHMIDT RN  [facility]

## 2022-02-21 ENCOUNTER — MYC MEDICAL ADVICE (OUTPATIENT)
Dept: FAMILY MEDICINE | Facility: CLINIC | Age: 67
End: 2022-02-21
Payer: COMMERCIAL

## 2022-02-21 DIAGNOSIS — M25.562 ACUTE PAIN OF LEFT KNEE: Primary | ICD-10-CM

## 2022-02-21 NOTE — TELEPHONE ENCOUNTER
See [employer] message, routed to Florentino Chavez MD, please confirm f/u plan and advise via [employer]  Alix Weaver RN, BSN  [facility]

## 2022-04-15 ENCOUNTER — MYC MEDICAL ADVICE (OUTPATIENT)
Dept: FAMILY MEDICINE | Facility: CLINIC | Age: 67
End: 2022-04-15
Payer: COMMERCIAL

## 2022-04-15 DIAGNOSIS — L65.9 ALOPECIA: ICD-10-CM

## 2022-04-15 RX ORDER — FINASTERIDE 1 MG/1
1 TABLET, FILM COATED ORAL DAILY
Qty: 90 TABLET | Refills: 0 | Status: SHIPPED | OUTPATIENT
Start: 2022-04-15 | End: 2022-06-01

## 2022-04-15 NOTE — TELEPHONE ENCOUNTER
Patient requesting refill of finasteride be sent to mail order has current refill on file     Prescription approved per [facility] Refill Protocol.    Pending Prescriptions:                       Disp   Refills    finasteride (PROPECIA) 1 MG tablet        90 tab*0            Sig: Take 1 tablet (1 mg) by mouth daily    Advised patient to establish care with new provider as Dr. Chavez has retired     Agueda Buckley, Registered Nurse  [facility]

## 2022-06-01 ENCOUNTER — OFFICE VISIT (OUTPATIENT)
Dept: FAMILY MEDICINE | Facility: CLINIC | Age: 67
End: 2022-06-01
Payer: COMMERCIAL

## 2022-06-01 VITALS
HEIGHT: 74 IN | WEIGHT: 200.6 LBS | HEART RATE: 77 BPM | RESPIRATION RATE: 18 BRPM | TEMPERATURE: 98 F | SYSTOLIC BLOOD PRESSURE: 127 MMHG | DIASTOLIC BLOOD PRESSURE: 74 MMHG | OXYGEN SATURATION: 97 % | BODY MASS INDEX: 25.74 KG/M2

## 2022-06-01 DIAGNOSIS — G89.29 CHRONIC BILATERAL LOW BACK PAIN WITHOUT SCIATICA: ICD-10-CM

## 2022-06-01 DIAGNOSIS — N43.3 HYDROCELE, LEFT: ICD-10-CM

## 2022-06-01 DIAGNOSIS — M54.50 CHRONIC BILATERAL LOW BACK PAIN WITHOUT SCIATICA: ICD-10-CM

## 2022-06-01 DIAGNOSIS — L65.9 ALOPECIA: ICD-10-CM

## 2022-06-01 DIAGNOSIS — J45.41: ICD-10-CM

## 2022-06-01 DIAGNOSIS — Z12.11 SCREEN FOR COLON CANCER: ICD-10-CM

## 2022-06-01 DIAGNOSIS — I47.10 PAROXYSMAL SUPRAVENTRICULAR TACHYCARDIA (H): ICD-10-CM

## 2022-06-01 PROCEDURE — 99214 OFFICE O/P EST MOD 30 MIN: CPT | Performed by: FAMILY MEDICINE

## 2022-06-01 RX ORDER — FLUTICASONE PROPIONATE 50 MCG
SPRAY, SUSPENSION (ML) NASAL
Qty: 48 ML | Refills: 3 | Status: SHIPPED | OUTPATIENT
Start: 2022-06-01 | End: 2022-10-19

## 2022-06-01 RX ORDER — ALBUTEROL SULFATE 90 UG/1
1-2 AEROSOL, METERED RESPIRATORY (INHALATION) EVERY 4 HOURS PRN
Qty: 18 G | Refills: 3 | Status: SHIPPED | OUTPATIENT
Start: 2022-06-01 | End: 2022-12-20

## 2022-06-01 RX ORDER — FINASTERIDE 1 MG/1
1 TABLET, FILM COATED ORAL DAILY
Qty: 90 TABLET | Refills: 3 | Status: SHIPPED | OUTPATIENT
Start: 2022-06-01 | End: 2023-03-02

## 2022-06-01 RX ORDER — CYCLOBENZAPRINE HCL 5 MG
5 TABLET ORAL 3 TIMES DAILY PRN
Qty: 90 TABLET | Refills: 1 | Status: SHIPPED | OUTPATIENT
Start: 2022-06-01

## 2022-06-01 ASSESSMENT — ASTHMA QUESTIONNAIRES
ACT_TOTALSCORE: 18
QUESTION_4 LAST FOUR WEEKS HOW OFTEN HAVE YOU USED YOUR RESCUE INHALER OR NEBULIZER MEDICATION (SUCH AS ALBUTEROL): ONE OR TWO TIMES PER DAY
QUESTION_1 LAST FOUR WEEKS HOW MUCH OF THE TIME DID YOUR ASTHMA KEEP YOU FROM GETTING AS MUCH DONE AT WORK, SCHOOL OR AT HOME: NONE OF THE TIME
QUESTION_5 LAST FOUR WEEKS HOW WOULD YOU RATE YOUR ASTHMA CONTROL: WELL CONTROLLED
QUESTION_2 LAST FOUR WEEKS HOW OFTEN HAVE YOU HAD SHORTNESS OF BREATH: ONCE OR TWICE A WEEK
QUESTION_3 LAST FOUR WEEKS HOW OFTEN DID YOUR ASTHMA SYMPTOMS (WHEEZING, COUGHING, SHORTNESS OF BREATH, CHEST TIGHTNESS OR PAIN) WAKE YOU UP AT NIGHT OR EARLIER THAN USUAL IN THE MORNING: ONCE A WEEK
ACT_TOTALSCORE: 18

## 2022-06-01 NOTE — PROGRESS NOTES
"  Assessment & Plan     Screen for colon cancer  Opted to do   - COLOGUARD(EXACT SCIENCES)    Hydrocele, left  S/p surgical correction, doing excellent    Paroxysmal supraventricular tachycardia (H)  Related to Gabapentin, resolved after stopping the meds.     Alopecia (male pattern).  Controlled, continue on   - finasteride (PROPECIA) 1 MG tablet; Take 1 tablet (1 mg) by mouth daily    Moderate persistent asthma with allergic rhinitis without status asthmaticus with acute exacerbation  Not well controlled, advised to control allergic rhinitis, by taking his Flonase daily.   Also add daily ICS and recheck on asthma in 2 months via mychart)  - fluticasone (FLONASE) 50 MCG/ACT nasal spray; SHAKE WELL AND USE 2 SPRAYS IN EACH NOSTRIL DAILY  - albuterol (PROAIR HFA) 108 (90 Base) MCG/ACT inhaler; Inhale 1-2 puffs into the lungs every 4 hours as needed for shortness of breath / dyspnea  - fluticasone (ARNUITY ELLIPTA) 100 MCG/ACT inhaler; Inhale 1 puff into the lungs daily    Chronic bilateral low back pain without sciatica  Stopped hydrocodone, congratulated on the ability to do so, may use   - cyclobenzaprine (FLEXERIL) 5 MG tablet; Take 1 tablet (5 mg) by mouth 3 times daily as needed for muscle spasms  Use recommended only before bed time. And limited to few times a year.           BMI:   Estimated body mass index is 25.76 kg/m  as calculated from the following:    Height as of this encounter: 1.88 m (6' 2\").    Weight as of this encounter: 91 kg (200 lb 9.6 oz).           Return in about 1 year (around 6/1/2023) for Routine physical..    Radha Rosas MD  [facility] MANDA White is a 66 year old who presents for the following health issues     History of Present Illness       Reason for visit:  New primary care doctor 1st visit.    He eats 2-3 servings of fruits and vegetables daily.He consumes 0 sweetened beverage(s) daily.He exercises with enough effort to increase his heart rate " "30 to 60 minutes per day.  He exercises with enough effort to increase his heart rate 6 days per week.   He is taking medications regularly.    Asthma Follow-Up    Was ACT completed today?    Yes    ACT Total Scores 7/14/2021   ACT TOTAL SCORE -   ASTHMA ER VISITS -   ASTHMA HOSPITALIZATIONS -   ACT TOTAL SCORE (Goal Greater than or Equal to 20) 18   In the past 12 months, how many times did you visit the emergency room for your asthma without being admitted to the hospital? 0   In the past 12 months, how many times were you hospitalized overnight because of your asthma? 0          How many days per week do you miss taking your asthma controller medication?  I do not have an asthma controller medication    Please describe any recent triggers for your asthma: dust mites, pollens and cold air    Have you had any Emergency Room Visits, Urgent Care Visits, or Hospital Admissions since your last office visit?  No    GERD:   Controlled, using Prilosec once daily and uses for short course then he stops.    Pt does use finasteride daily for hair loss, and it is working very well.    Chronic/Recurring Back Pain Follow Up      Where is your back pain located? (Select all that apply) low back bilateral    How would you describe your back pain?  dull ache    Where does your back pain spread? the right and left buttock sometimes.    Since your last clinic visit for back pain, how has your pain changed? gradually improving    Does your back pain interfere with your job? Not applicable    Since your last visit, have you tried any new treatment? No, pt stopped hydrocodone.       Review of Systems   CONSTITUTIONAL: NEGATIVE for fever, chills, change in weight      Objective    /74 (BP Location: Right arm, Patient Position: Sitting, Cuff Size: Adult Large)   Pulse 77   Temp 98  F (36.7  C) (Oral)   Resp 18   Ht 1.88 m (6' 2\")   Wt 91 kg (200 lb 9.6 oz)   SpO2 97%   BMI 25.76 kg/m    Body mass index is 25.76 " kg/m .  Physical Exam   GENERAL: healthy, alert and no distress  HENT: ear canals and TM's normal, nose and mouth without ulcers or lesions  NECK: no adenopathy, no asymmetry, masses, or scars and thyroid normal to palpation  RESP: lungs clear to auscultation - no rales, rhonchi or wheezes  CV: regular rate and rhythm, normal S1 S2, no S3 or S4, no murmur, click or rub,  MS: no gross musculoskeletal defects noted, no edema

## 2022-07-07 LAB — NONINV COLON CA DNA+OCC BLD SCRN STL QL: NEGATIVE

## 2022-08-01 ENCOUNTER — TELEPHONE (OUTPATIENT)
Dept: FAMILY MEDICINE | Facility: CLINIC | Age: 67
End: 2022-08-01

## 2022-08-01 ASSESSMENT — ASTHMA QUESTIONNAIRES: ACT_TOTALSCORE: 21

## 2022-08-12 ENCOUNTER — OFFICE VISIT (OUTPATIENT)
Dept: URGENT CARE | Facility: URGENT CARE | Age: 67
End: 2022-08-12
Payer: COMMERCIAL

## 2022-08-12 VITALS
WEIGHT: 200 LBS | DIASTOLIC BLOOD PRESSURE: 84 MMHG | SYSTOLIC BLOOD PRESSURE: 137 MMHG | BODY MASS INDEX: 25.67 KG/M2 | HEIGHT: 74 IN | HEART RATE: 107 BPM | RESPIRATION RATE: 16 BRPM | TEMPERATURE: 98.1 F

## 2022-08-12 DIAGNOSIS — M54.12 CERVICAL RADICULOPATHY: ICD-10-CM

## 2022-08-12 DIAGNOSIS — R20.2 PARESTHESIA: ICD-10-CM

## 2022-08-12 DIAGNOSIS — R07.89 ATYPICAL CHEST PAIN: Primary | ICD-10-CM

## 2022-08-12 LAB
BASOPHILS # BLD AUTO: 0 10E3/UL (ref 0–0.2)
BASOPHILS NFR BLD AUTO: 0 %
EOSINOPHIL # BLD AUTO: 0.1 10E3/UL (ref 0–0.7)
EOSINOPHIL NFR BLD AUTO: 1 %
ERYTHROCYTE [DISTWIDTH] IN BLOOD BY AUTOMATED COUNT: 12.4 % (ref 10–15)
HCT VFR BLD AUTO: 48.4 % (ref 40–53)
HGB BLD-MCNC: 16.7 G/DL (ref 13.3–17.7)
LYMPHOCYTES # BLD AUTO: 2.4 10E3/UL (ref 0.8–5.3)
LYMPHOCYTES NFR BLD AUTO: 29 %
MCH RBC QN AUTO: 33.2 PG (ref 26.5–33)
MCHC RBC AUTO-ENTMCNC: 34.5 G/DL (ref 31.5–36.5)
MCV RBC AUTO: 96 FL (ref 78–100)
MONOCYTES # BLD AUTO: 0.7 10E3/UL (ref 0–1.3)
MONOCYTES NFR BLD AUTO: 8 %
NEUTROPHILS # BLD AUTO: 5.1 10E3/UL (ref 1.6–8.3)
NEUTROPHILS NFR BLD AUTO: 61 %
PLATELET # BLD AUTO: 232 10E3/UL (ref 150–450)
RBC # BLD AUTO: 5.03 10E6/UL (ref 4.4–5.9)
WBC # BLD AUTO: 8.2 10E3/UL (ref 4–11)

## 2022-08-12 PROCEDURE — 83735 ASSAY OF MAGNESIUM: CPT | Performed by: FAMILY MEDICINE

## 2022-08-12 PROCEDURE — 80050 GENERAL HEALTH PANEL: CPT | Performed by: FAMILY MEDICINE

## 2022-08-12 PROCEDURE — 93000 ELECTROCARDIOGRAM COMPLETE: CPT | Performed by: FAMILY MEDICINE

## 2022-08-12 PROCEDURE — 36415 COLL VENOUS BLD VENIPUNCTURE: CPT | Performed by: FAMILY MEDICINE

## 2022-08-12 PROCEDURE — 99214 OFFICE O/P EST MOD 30 MIN: CPT | Performed by: FAMILY MEDICINE

## 2022-08-12 RX ORDER — IBUPROFEN 800 MG/1
800 TABLET, FILM COATED ORAL EVERY 8 HOURS PRN
Qty: 60 TABLET | Refills: 0 | Status: SHIPPED | OUTPATIENT
Start: 2022-08-12 | End: 2022-10-19

## 2022-08-12 NOTE — PROGRESS NOTES
SUBJECTIVE:  Chief Complaint   Patient presents with     Pain     Pain and numbness left arm started last night. Aleve not helping. Cant sleep on left side.      Christopher Buchanan is a 66 year old male who presents with a chief complaint of left sided chest and axilla pain, numbness.    Was mowing the lawn yesterday, no trauma or injury.  Notice left arm/axilla numbness and tingling down the arm last night around 9:30pm.  Had discomfort while sleeping, did try to prop the pillow and then able to sleep around 12:30 am.  Had tried taking aleve, then tylenol, then left over vicodin.    This morning, symptoms still there but is less than last evening.    Had prior cervical surgery - fusion for C6-7 for right sided radiculopathy in 2018, has overall improve.  Has minor discomfort, worse during the winter.  The pain is usually higher on upper shoulder/neck area in the left side    Denies any recent URI symptoms.    Father dies heart attack at 52    Past Medical History:   Diagnosis Date     ALLERGIC RHINITIS NOS 5/8/2003     CARDIOVASCULAR SCREENING; LDL GOAL LESS THAN 160 10/31/2010     Erectile dysfunction 9/30/2011     Esophageal reflux 5/8/2003     GENERAL OSTEOARTHROSIS 5/8/2003     Hernia, abdominal      Hydrocele      Hydrocele, left 3/31/2011     Low back pain 9/30/2011     Moderate persistent asthma 3/21/2011     Mumps      Prostatism      Tuberculosis      Current Outpatient Medications   Medication Sig Dispense Refill     acetaminophen (TYLENOL) 325 MG tablet Take 325-650 mg by mouth every 6 hours as needed for mild pain       albuterol (PROAIR HFA) 108 (90 Base) MCG/ACT inhaler Inhale 1-2 puffs into the lungs every 4 hours as needed for shortness of breath / dyspnea 18 g 3     cyclobenzaprine (FLEXERIL) 5 MG tablet Take 1 tablet (5 mg) by mouth 3 times daily as needed for muscle spasms 90 tablet 1     finasteride (PROPECIA) 1 MG tablet Take 1 tablet (1 mg) by mouth daily 90 tablet 3     fluticasone (ARNUITY  "ELLIPTA) 100 MCG/ACT inhaler Inhale 1 puff into the lungs daily 3 each 3     fluticasone (FLONASE) 50 MCG/ACT nasal spray SHAKE WELL AND USE 2 SPRAYS IN EACH NOSTRIL DAILY 48 mL 3     omeprazole (PRILOSEC) 20 MG DR capsule Take 1 capsule (20 mg) by mouth daily 30 capsule 11     tadalafil (CIALIS) 5 MG tablet TAKE 1 TABLET BY MOUTH EVERY 24 HOURS 60 tablet 0     zolpidem (AMBIEN) 10 MG tablet Take one half to one tablet at bedtime as needed for sleep 30 tablet 3     Social History     Tobacco Use     Smoking status: Never Smoker     Smokeless tobacco: Never Used   Substance Use Topics     Alcohol use: Yes     Comment: occ       ROS:  Review of systems negative except as stated above.    EXAM:   /84 (BP Location: Right arm, Patient Position: Sitting, Cuff Size: Adult Regular)   Pulse 107   Temp 98.1  F (36.7  C) (Oral)   Resp 16   Ht 1.88 m (6' 2\")   Wt 90.7 kg (200 lb)   BMI 25.68 kg/m    GENERAL APPEARANCE: healthy, alert and no distress  EXTREMITIES: peripheral pulses normal  SKIN: no suspicious lesions or rashes  PSYCH:alert, affect bright    X-RAY was done - cervical spine - multilevel DJD changes, hardware appears intact personally viewed by me    EKG - NSR, rate 65, no ST c/w ischemia    Results for orders placed or performed in visit on 08/12/22   CBC with platelets and differential     Status: Abnormal   Result Value Ref Range    WBC Count 8.2 4.0 - 11.0 10e3/uL    RBC Count 5.03 4.40 - 5.90 10e6/uL    Hemoglobin 16.7 13.3 - 17.7 g/dL    Hematocrit 48.4 40.0 - 53.0 %    MCV 96 78 - 100 fL    MCH 33.2 (H) 26.5 - 33.0 pg    MCHC 34.5 31.5 - 36.5 g/dL    RDW 12.4 10.0 - 15.0 %    Platelet Count 232 150 - 450 10e3/uL    % Neutrophils 61 %    % Lymphocytes 29 %    % Monocytes 8 %    % Eosinophils 1 %    % Basophils 0 %    Absolute Neutrophils 5.1 1.6 - 8.3 10e3/uL    Absolute Lymphocytes 2.4 0.8 - 5.3 10e3/uL    Absolute Monocytes 0.7 0.0 - 1.3 10e3/uL    Absolute Eosinophils 0.1 0.0 - 0.7 10e3/uL    " Absolute Basophils 0.0 0.0 - 0.2 10e3/uL   CBC with platelets and differential     Status: Abnormal    Narrative    The following orders were created for panel order CBC with platelets and differential.  Procedure                               Abnormality         Status                     ---------                               -----------         ------                     CBC with platelets and d...[[id number]]  Abnormal            Final result                 Please view results for these tests on the individual orders.         ASSESSMENT/PLAN:  (R07.89) Atypical chest pain  (primary encounter diagnosis)  Plan: EKG 12-lead complete w/read - Clinics, CBC with        platelets and differential, Comprehensive         metabolic panel (BMP + Alb, Alk Phos, ALT, AST,        Total. Bili, TP)            (R20.2) Paresthesia  Comment: left sided and arm  Plan: XR Cervical Spine 2/3 Views, CBC with platelets        and differential, Comprehensive metabolic panel        (BMP + Alb, Alk Phos, ALT, AST, Total. Bili,         TP), Magnesium, TSH with free T4 reflex,         ibuprofen (ADVIL/MOTRIN) 800 MG tablet            (M54.12) Cervical radiculopathy  Comment: left  Plan: ibuprofen (ADVIL/MOTRIN) 800 MG tablet            Patient appears well, no acute distress and vitals stable.  EKG with no acute changes concerning for ACS, reviewed with patient that further cardiac work up needs to be with primary provider.  Patient instructed to go to ER if develops any acute worsening symptoms.    Labs obtained for paresthesia symptoms, will follow up on pending results and notify if any abnormalities.  Discussed that further evaluation for paresthesia will need to be thru primary provider.  RX ibuprofen 800 mg for both paresthesia and left cervical radiculopathy.  Will follow up on formal Xray report and notify if any abnormalities.  Patient will follow up with his spine specialist thru [facility].    Follow up with  primary provider in 1 week    Ferny Hernandez MD  August 12, 2022 2:55 PM

## 2022-08-13 LAB
ALBUMIN SERPL-MCNC: 3.8 G/DL (ref 3.4–5)
ALP SERPL-CCNC: 51 U/L (ref 40–150)
ALT SERPL W P-5'-P-CCNC: 21 U/L (ref 0–70)
ANION GAP SERPL CALCULATED.3IONS-SCNC: 9 MMOL/L (ref 3–14)
AST SERPL W P-5'-P-CCNC: 18 U/L (ref 0–45)
BILIRUB SERPL-MCNC: 0.5 MG/DL (ref 0.2–1.3)
BUN SERPL-MCNC: 15 MG/DL (ref 7–30)
CALCIUM SERPL-MCNC: 8.9 MG/DL (ref 8.5–10.1)
CHLORIDE BLD-SCNC: 105 MMOL/L (ref 94–109)
CO2 SERPL-SCNC: 24 MMOL/L (ref 20–32)
CREAT SERPL-MCNC: 0.8 MG/DL (ref 0.66–1.25)
GFR SERPL CREATININE-BSD FRML MDRD: >90 ML/MIN/1.73M2
GLUCOSE BLD-MCNC: 96 MG/DL (ref 70–99)
MAGNESIUM SERPL-MCNC: 2.5 MG/DL (ref 1.6–2.3)
POTASSIUM BLD-SCNC: 4.3 MMOL/L (ref 3.4–5.3)
PROT SERPL-MCNC: 6.7 G/DL (ref 6.8–8.8)
SODIUM SERPL-SCNC: 138 MMOL/L (ref 133–144)
TSH SERPL DL<=0.005 MIU/L-ACNC: 1.52 MU/L (ref 0.4–4)

## 2022-08-15 ENCOUNTER — MYC MEDICAL ADVICE (OUTPATIENT)
Dept: FAMILY MEDICINE | Facility: CLINIC | Age: 67
End: 2022-08-15

## 2022-08-16 NOTE — TELEPHONE ENCOUNTER
Called [facility]. Spoke with OMAR Kee.  Notified Primary Care Provider recommends spine specialist order needed imaging.    They will see patient and order imaging.    Iza Sadler RN

## 2022-08-16 NOTE — TELEPHONE ENCOUNTER
Dr. Rosas     Please review my chart     Is an Evisit okay for this request? Or prefer F2F/Virtual ?     OR     Would you like [facility] to order imaging at visit on 9/8/2022     Agueda Buckley, Registered Nurse  [facility]

## 2022-08-18 ENCOUNTER — TRANSFERRED RECORDS (OUTPATIENT)
Dept: HEALTH INFORMATION MANAGEMENT | Facility: CLINIC | Age: 67
End: 2022-08-18

## 2022-09-26 ENCOUNTER — LAB (OUTPATIENT)
Dept: URGENT CARE | Facility: URGENT CARE | Age: 67
End: 2022-09-26
Attending: FAMILY MEDICINE
Payer: COMMERCIAL

## 2022-09-26 DIAGNOSIS — Z20.822 SUSPECTED 2019 NOVEL CORONAVIRUS INFECTION: ICD-10-CM

## 2022-09-26 LAB — SARS-COV-2 RNA RESP QL NAA+PROBE: NEGATIVE

## 2022-09-26 PROCEDURE — U0003 INFECTIOUS AGENT DETECTION BY NUCLEIC ACID (DNA OR RNA); SEVERE ACUTE RESPIRATORY SYNDROME CORONAVIRUS 2 (SARS-COV-2) (CORONAVIRUS DISEASE [COVID-19]), AMPLIFIED PROBE TECHNIQUE, MAKING USE OF HIGH THROUGHPUT TECHNOLOGIES AS DESCRIBED BY CMS-2020-01-R: HCPCS

## 2022-09-26 PROCEDURE — U0005 INFEC AGEN DETEC AMPLI PROBE: HCPCS

## 2022-09-27 ENCOUNTER — NURSE TRIAGE (OUTPATIENT)
Dept: FAMILY MEDICINE | Facility: CLINIC | Age: 67
End: 2022-09-27

## 2022-09-27 ENCOUNTER — MYC MEDICAL ADVICE (OUTPATIENT)
Dept: FAMILY MEDICINE | Facility: CLINIC | Age: 67
End: 2022-09-27

## 2022-09-27 NOTE — TELEPHONE ENCOUNTER
Responded to pt Mychart message advising pt to call and speak to a triage nurse.    Winter Tuttle RN

## 2022-09-27 NOTE — TELEPHONE ENCOUNTER
"Called pt and relayed provider message below. Pt states \"no I can't do that, I can't afford it. I'm just going to wait and hopefully it will get better on its own. If it gets worse, then I might have to go in but for now, I'm going to just wait and see\".    Winter Tuttle RN    "

## 2022-09-27 NOTE — TELEPHONE ENCOUNTER
"Nurse Triage SBAR    Is this a 2nd Level Triage? YES, LICENSED PRACTITIONER REVIEW IS REQUIRED    Situation: shortness of breath, cough    Background: Pt started developing a cough on Wednesday 9/21/22. Pt has taken 2 home COVID tests that were negative and took PCR test yesterday 9/26/22 that was negative as well. Pt has asthma and was prescribed a preventative steroid inhaler but stopped using inhaler about a month ago (pt states \"it made my heartburn worse so I stopped using it\"). Pt informs he has had previous upper respiratory infections that have felt similar in past and have resolved with antibiotics.    Assessment: Pt experiencing mild to moderate shortness of breath that worsens with exertion. Pt informs he is still able to take dog out on long walks (\"If I take inhaler, I'm okay, I can take the dog for walk\"). Pt is using his rescue inhaler more frequently than prescribed (every couple hours) Pt informs he is waking up with coughing fits at night. Pt informs he is taking Mucinex and has a productive cough and is bringing up pale yellow phlegm. Pt also has a tight chest (pt states \"it was really bad yesterday and the day before like a burning in my lungs,but it is better today\"). Pt also has sore throat and runny nose (pt informs runny nose has been ongoing and constant with fall allergies).     Denies fever    Protocol Recommended Disposition:   No disposition on file. Go to office now    Recommendation: Reviewed care advice with pt. Instructed pt to call back if new or worsening symptoms.  Patient was given an opportunity to ask questions, verbalized understanding of plan, and is agreeable.    Pt informs he does not have money right now for Office visit or urgent care and will wait until next scheduled OV with PCP on 10/27/22 if visit is required. Discussed e-visit option with pt, pt is willing to submit e-visit as it will cost him less than an OV. E-visit okay?    Routing to PCP to review and " advise.  Winter Tuttle, OMAR      Routed to provider    Does the patient meet one of the following criteria for ADS visit consideration? 16+ years old, with an [facility] PCP     TIP  Providers, please consider if this condition is appropriate for management at one of our Acute and Diagnostic Services sites.     If patient is a good candidate, please use dotphrase <dot>triageresponse and select Refer to ADS to document.      Reason for Disposition    MILD difficulty breathing (e.g., minimal/no SOB at rest, SOB with walking, pulse < 100) of new-onset or worse than normal    Additional Information    Negative: SEVERE difficulty breathing (e.g., struggling for each breath, speaks in single words, pulse > 120)    Negative: Breathing stopped and hasn't returned    Negative: Choking on something    Negative: Bluish (or gray) lips or face    Negative: Difficult to awaken or acting confused (e.g., disoriented, slurred speech)    Negative: Passed out (i.e., fainted, collapsed and was not responding)    Negative: Wheezing started suddenly after medicine, an allergic food, or bee sting    Negative: Stridor    Negative: Slow, shallow and weak breathing    Negative: Sounds like a life-threatening emergency to the triager    Negative: Chest pain    Negative: Wheezing (high pitched whistling sound) and previous asthma attacks or use of asthma medicines    Negative: Difficulty breathing and within 14 days of COVID-19 Exposure    Negative: Difficulty breathing and only present when coughing    Negative: Difficulty breathing and only from stuffy nose    Negative: Difficulty breathing and only from stuffy nose or runny nose from common cold    Negative: MODERATE difficulty breathing (e.g., speaks in phrases, SOB even at rest, pulse 100-120) of new-onset or worse than normal    Negative: Oxygen level (e.g., pulse oximetry) 90 percent or lower    Negative: Wheezing can be heard across the room    Negative: Drooling or spitting out saliva  "(because can't swallow)    Negative: Any history of prior \"blood clot\" in leg or lungs    Negative: Illness requiring prolonged bedrest in past month (e.g., immobilization, long hospital stay)    Negative: Hip or leg fracture (broken bone) in past month (or had cast on leg or ankle in past month)    Negative: Major surgery in the past month    Negative: Long-distance travel in past month (e.g., car, bus, train, plane; with trip lasting 6 or more hours)    Negative: Cancer treatment in past six months (or has cancer now)    Negative: Extra heart beats OR irregular heart beating (i.e., \"palpitations\")    Negative: Fever > 103 F (39.4 C)    Negative: Fever > 101 F (38.3 C) and over 60 years of age    Negative: Fever > 100.0 F (37.8 C) and bedridden (e.g., nursing home patient, stroke, chronic illness, recovering from surgery)    Negative: Fever > 100.0 F (37.8 C) and diabetes mellitus or weak immune system (e.g., HIV positive, cancer chemo, splenectomy, organ transplant, chronic steroids)    Negative: Periods where breathing stops and then resumes normally and bedridden (e.g., nursing home patient, CVA)    Negative: Pregnant or postpartum (from 0 to 6 weeks after delivery)    Negative: Patient sounds very sick or weak to the triager    Answer Assessment - Initial Assessment Questions  1. RESPIRATORY STATUS: \"Describe your breathing?\" (e.g., wheezing, shortness of breath, unable to speak, severe coughing)       Shortness of breath, wheezing (a little), coughing- persistent, wakes up at night (productive- pale yellow phlegm)  2. ONSET: \"When did this breathing problem begin?\"       Cough started Wednesday 9/21/22 (2 COVID home tests and PCR test yesterday 9/26/22)  3. PATTERN \"Does the difficult breathing come and go, or has it been constant since it started?\"       If I take inhaler, I'm okay, I can take the dog for walk (taking every couple hours- taking more often than prescribed), stopped taking preventative steroid " "inhaler (stopped a month ago due to reflux)    4. SEVERITY: \"How bad is your breathing?\" (e.g., mild, moderate, severe)     - MILD: No SOB at rest, mild SOB with walking, speaks normally in sentences, can lie down, no retractions, pulse < 100.     - MODERATE: SOB at rest, SOB with minimal exertion and prefers to sit, cannot lie down flat, speaks in phrases, mild retractions, audible wheezing, pulse 100-120.     - SEVERE: Very SOB at rest, speaks in single words, struggling to breathe, sitting hunched forward, retractions, pulse > 120       Mild-moderate  5. RECURRENT SYMPTOM: \"Have you had difficulty breathing before?\" If Yes, ask: \"When was the last time?\" and \"What happened that time?\"       Yes, URI with antibiotics resolved  6. CARDIAC HISTORY: \"Do you have any history of heart disease?\" (e.g., heart attack, angina, bypass surgery, angioplasty)       no  7. LUNG HISTORY: \"Do you have any history of lung disease?\"  (e.g., pulmonary embolus, asthma, emphysema)      asthma  8. CAUSE: \"What do you think is causing the breathing problem?\"       Respiratory infection  9. OTHER SYMPTOMS: \"Do you have any other symptoms? (e.g., dizziness, runny nose, cough, chest pain, fever)  Tight chest (really bad yesterday and day before like a burning in my lungs, better today, worse with exertion, runny nose (constant with fall allergies), cough, sore throat      Denies fever  10. O2 SATURATION MONITOR:  \"Do you use an oxygen saturation monitor (pulse oximeter) at home?\" If Yes, \"What is your reading (oxygen level) today?\" \"What is your usual oxygen saturation reading?\" (e.g., 95%)        no  11. PREGNANCY: \"Is there any chance you are pregnant?\" \"When was your last menstrual period?\"        no  12. TRAVEL: \"Have you traveled out of the country in the last month?\" (e.g., travel history, exposures)        no    Protocols used: BREATHING DIFFICULTY-A-OH      "

## 2022-10-16 ENCOUNTER — HEALTH MAINTENANCE LETTER (OUTPATIENT)
Age: 67
End: 2022-10-16

## 2022-10-19 ENCOUNTER — OFFICE VISIT (OUTPATIENT)
Dept: FAMILY MEDICINE | Facility: CLINIC | Age: 67
End: 2022-10-19
Payer: COMMERCIAL

## 2022-10-19 VITALS
DIASTOLIC BLOOD PRESSURE: 64 MMHG | SYSTOLIC BLOOD PRESSURE: 110 MMHG | RESPIRATION RATE: 16 BRPM | OXYGEN SATURATION: 97 % | BODY MASS INDEX: 26.49 KG/M2 | HEART RATE: 71 BPM | WEIGHT: 206.4 LBS | HEIGHT: 74 IN | TEMPERATURE: 98.4 F

## 2022-10-19 DIAGNOSIS — J45.41: ICD-10-CM

## 2022-10-19 DIAGNOSIS — L65.9 HAIR LOSS: Primary | ICD-10-CM

## 2022-10-19 DIAGNOSIS — K21.00 GASTROESOPHAGEAL REFLUX DISEASE WITH ESOPHAGITIS WITHOUT HEMORRHAGE: ICD-10-CM

## 2022-10-19 DIAGNOSIS — M25.512 ACUTE PAIN OF LEFT SHOULDER: ICD-10-CM

## 2022-10-19 DIAGNOSIS — G47.9 SLEEP DISORDER: ICD-10-CM

## 2022-10-19 LAB — FERRITIN SERPL-MCNC: 208 NG/ML (ref 26–388)

## 2022-10-19 PROCEDURE — 82728 ASSAY OF FERRITIN: CPT | Performed by: FAMILY MEDICINE

## 2022-10-19 PROCEDURE — 90662 IIV NO PRSV INCREASED AG IM: CPT | Performed by: FAMILY MEDICINE

## 2022-10-19 PROCEDURE — 36415 COLL VENOUS BLD VENIPUNCTURE: CPT | Performed by: FAMILY MEDICINE

## 2022-10-19 PROCEDURE — 90471 IMMUNIZATION ADMIN: CPT | Performed by: FAMILY MEDICINE

## 2022-10-19 PROCEDURE — 99214 OFFICE O/P EST MOD 30 MIN: CPT | Mod: 25 | Performed by: FAMILY MEDICINE

## 2022-10-19 RX ORDER — IBUPROFEN 600 MG/1
600 TABLET, FILM COATED ORAL EVERY 6 HOURS PRN
Qty: 60 TABLET | Refills: 0 | Status: SHIPPED | OUTPATIENT
Start: 2022-10-19

## 2022-10-19 RX ORDER — FEXOFENADINE HCL 180 MG/1
180 TABLET ORAL DAILY
Qty: 90 TABLET | Refills: 3 | Status: SHIPPED | OUTPATIENT
Start: 2022-10-19 | End: 2023-03-02

## 2022-10-19 RX ORDER — ZOLPIDEM TARTRATE 10 MG/1
5 TABLET ORAL
Qty: 15 TABLET | Refills: 0 | Status: SHIPPED | OUTPATIENT
Start: 2022-10-19

## 2022-10-19 NOTE — PROGRESS NOTES
0  Assessment & Plan     Sleep disorder  Pt is going to [facility], and he is requesting short supply of ambien when he is in the hotel rooms, will give 15 tabs only and use lower dose due to age.   - zolpidem (AMBIEN) 10 MG tablet; Take 0.5 tablets (5 mg) by mouth nightly as needed for sleep Take one half to one tablet at bedtime as needed for sleep    Gastroesophageal reflux disease with esophagitis without hemorrhage  Stable, continue on   - omeprazole (PRILOSEC) 20 MG DR capsule; Take 1 capsule (20 mg) by mouth daily    Moderate persistent asthma with allergic rhinitis without status asthmaticus with acute exacerbation  With URI, it has improved, and cough is better, explained that there is no recommendations for abx in acute asthma exacerbations.   Meanwhile, stop Flonase and switch to allegra 180 mg daily for his allergic rhinitis.   - fexofenadine (ALLEGRA) 180 MG tablet; Take 1 tablet (180 mg) by mouth daily    Hair loss  Check TSH (checked normal), and   - Ferritin; Future  - Ferritin  I think this is mostly alopecia related to stress, explained the benign nature and that the hair usually comes back in few months.   Meanwhile, continue on finasteride.     Acute pain of left shoulder  Much improved now. May use   - ibuprofen (ADVIL/MOTRIN) 600 MG tablet; Take 1 tablet (600 mg) by mouth every 6 hours as needed for moderate pain                 No follow-ups on file.   Follow-up Visit   Expected date:  Oct 19, 2023 (Approximate)      Follow Up Appointment Details:     Follow-up with whom?: Me    Follow-Up for what?: Adult Preventive    How?: In Person    Is this an as-needed follow-up?: No                    Radha Rosas MD  [facility] MANDA White is a 67 year old, presenting for the following health issues:  RECHECK and Cough      History of Present Illness       Reason for visit:  Follow up on urgent care visit on 8/12/22 and persistent cough nurse triage on 12/27/22. I also  "need to get my Flu shot.    He eats 2-3 servings of fruits and vegetables daily.He consumes 0 sweetened beverage(s) daily.He exercises with enough effort to increase his heart rate 30 to 60 minutes per day.  He exercises with enough effort to increase his heart rate 5 days per week.   He is taking medications regularly.       ED/UC Followup:    Facility:  [facility]   Date of visit: 08/12/2022  Reason for visit: Chest pain that had subsided now.  Current Status: Pt states that he is nervous in regards to the UC visit as he has noticed that his hair is falling out. He also states that he never got an answer/ diagnosis from the UC visit which is also causing him a bit of nervousness.   He noticed that he has been loosing more hair in the scalp, groin and eyebrows, and this started 2 months ago.     Acute Illness  Acute illness concerns: Cough   Onset/Duration: September 27, 2022  Symptoms:  Fever: No  Chills/Sweats: No  Headache (location?): no   Sinus Pressure: No  Conjunctivitis:  No  Ear Pain: no  Rhinorrhea: YES  Congestion: No  Sore Throat: No  Cough: YES-productive of yellow sputum, productive of green sputum  Wheeze: YES  Decreased Appetite: No  Nausea: No  Vomiting: No  Diarrhea: No   Dysuria/Freq.: No  Dysuria or Hematuria: No  Fatigue/Achiness: YES  Sick/Strep Exposure: No  Therapies tried and outcome: None  Pt has been still coughing but it got better, he has been using mucinex but he stopped as the cough improved. He is worried as he is still producing phlegm.       Review of Systems   CONSTITUTIONAL: NEGATIVE for fever, chills, change in weight  RESP:cough.  CV: NEGATIVE for chest pain, palpitations or peripheral edema      Objective    /64 (BP Location: Right arm, Patient Position: Sitting, Cuff Size: Adult Large)   Pulse 71   Temp 98.4  F (36.9  C) (Oral)   Resp 16   Ht 1.88 m (6' 2\")   Wt 93.6 kg (206 lb 6.4 oz)   SpO2 97%   BMI 26.50 kg/m    Body mass index is " 26.5 kg/m .  Physical Exam   GENERAL: healthy, alert and no distress  RESP: lungs clear to auscultation - no rales, rhonchi or wheezes  CV: regular rate and rhythm, normal S1 S2, no S3 or S4, no murmur, click or rub, no peripheral edema and peripheral pulses strong  SKIN: hair quality normal, pulling sign is negative.

## 2022-12-19 DIAGNOSIS — J45.41: ICD-10-CM

## 2022-12-20 RX ORDER — ALBUTEROL SULFATE 90 UG/1
AEROSOL, METERED RESPIRATORY (INHALATION)
Qty: 8.5 G | Refills: 1 | Status: SHIPPED | OUTPATIENT
Start: 2022-12-20 | End: 2023-11-01

## 2022-12-20 NOTE — TELEPHONE ENCOUNTER
Prescription approved per [facility] Refill Protocol.    Agueda Buckley, Registered Nurse  [facility]

## 2023-02-23 SDOH — HEALTH STABILITY: PHYSICAL HEALTH: ON AVERAGE, HOW MANY DAYS PER WEEK DO YOU ENGAGE IN MODERATE TO STRENUOUS EXERCISE (LIKE A BRISK WALK)?: 6 DAYS

## 2023-02-23 SDOH — ECONOMIC STABILITY: TRANSPORTATION INSECURITY
IN THE PAST 12 MONTHS, HAS THE LACK OF TRANSPORTATION KEPT YOU FROM MEDICAL APPOINTMENTS OR FROM GETTING MEDICATIONS?: NO

## 2023-02-23 SDOH — HEALTH STABILITY: PHYSICAL HEALTH: ON AVERAGE, HOW MANY MINUTES DO YOU ENGAGE IN EXERCISE AT THIS LEVEL?: 50 MIN

## 2023-02-23 SDOH — ECONOMIC STABILITY: FOOD INSECURITY: WITHIN THE PAST 12 MONTHS, THE FOOD YOU BOUGHT JUST DIDN'T LAST AND YOU DIDN'T HAVE MONEY TO GET MORE.: NEVER TRUE

## 2023-02-23 SDOH — ECONOMIC STABILITY: FOOD INSECURITY: WITHIN THE PAST 12 MONTHS, YOU WORRIED THAT YOUR FOOD WOULD RUN OUT BEFORE YOU GOT MONEY TO BUY MORE.: NEVER TRUE

## 2023-02-23 SDOH — ECONOMIC STABILITY: INCOME INSECURITY: IN THE LAST 12 MONTHS, WAS THERE A TIME WHEN YOU WERE NOT ABLE TO PAY THE MORTGAGE OR RENT ON TIME?: NO

## 2023-02-23 SDOH — ECONOMIC STABILITY: INCOME INSECURITY: HOW HARD IS IT FOR YOU TO PAY FOR THE VERY BASICS LIKE FOOD, HOUSING, MEDICAL CARE, AND HEATING?: NOT HARD AT ALL

## 2023-02-23 SDOH — ECONOMIC STABILITY: TRANSPORTATION INSECURITY
IN THE PAST 12 MONTHS, HAS LACK OF TRANSPORTATION KEPT YOU FROM MEETINGS, WORK, OR FROM GETTING THINGS NEEDED FOR DAILY LIVING?: NO

## 2023-02-23 ASSESSMENT — SOCIAL DETERMINANTS OF HEALTH (SDOH)
IN A TYPICAL WEEK, HOW MANY TIMES DO YOU TALK ON THE PHONE WITH FAMILY, FRIENDS, OR NEIGHBORS?: TWICE A WEEK
HOW OFTEN DO YOU GET TOGETHER WITH FRIENDS OR RELATIVES?: TWICE A WEEK
HOW OFTEN DO YOU ATTEND CHURCH OR RELIGIOUS SERVICES?: PATIENT DECLINED
DO YOU BELONG TO ANY CLUBS OR ORGANIZATIONS SUCH AS CHURCH GROUPS UNIONS, FRATERNAL OR ATHLETIC GROUPS, OR SCHOOL GROUPS?: YES

## 2023-02-23 ASSESSMENT — ENCOUNTER SYMPTOMS
CONSTIPATION: 0
CHILLS: 0
DYSURIA: 0
EYE PAIN: 0
PALPITATIONS: 0
SHORTNESS OF BREATH: 1
NERVOUS/ANXIOUS: 0
HEADACHES: 0
HEMATOCHEZIA: 0
FEVER: 0
HEMATURIA: 0
NAUSEA: 0
JOINT SWELLING: 1
DIARRHEA: 0
ABDOMINAL PAIN: 0
MYALGIAS: 1
PARESTHESIAS: 0
WEAKNESS: 0
SORE THROAT: 0
DIZZINESS: 0
COUGH: 0
FREQUENCY: 0
ARTHRALGIAS: 1
HEARTBURN: 1

## 2023-02-23 ASSESSMENT — ASTHMA QUESTIONNAIRES
QUESTION_2 LAST FOUR WEEKS HOW OFTEN HAVE YOU HAD SHORTNESS OF BREATH: ONCE OR TWICE A WEEK
QUESTION_1 LAST FOUR WEEKS HOW MUCH OF THE TIME DID YOUR ASTHMA KEEP YOU FROM GETTING AS MUCH DONE AT WORK, SCHOOL OR AT HOME: A LITTLE OF THE TIME
QUESTION_4 LAST FOUR WEEKS HOW OFTEN HAVE YOU USED YOUR RESCUE INHALER OR NEBULIZER MEDICATION (SUCH AS ALBUTEROL): TWO OR THREE TIMES PER WEEK
QUESTION_3 LAST FOUR WEEKS HOW OFTEN DID YOUR ASTHMA SYMPTOMS (WHEEZING, COUGHING, SHORTNESS OF BREATH, CHEST TIGHTNESS OR PAIN) WAKE YOU UP AT NIGHT OR EARLIER THAN USUAL IN THE MORNING: ONCE OR TWICE
QUESTION_5 LAST FOUR WEEKS HOW WOULD YOU RATE YOUR ASTHMA CONTROL: SOMEWHAT CONTROLLED
ACT_TOTALSCORE: 18
ACT_TOTALSCORE: 18

## 2023-02-23 ASSESSMENT — LIFESTYLE VARIABLES
HOW OFTEN DO YOU HAVE SIX OR MORE DRINKS ON ONE OCCASION: NEVER
AUDIT-C TOTAL SCORE: 3
SKIP TO QUESTIONS 9-10: 1
HOW OFTEN DO YOU HAVE A DRINK CONTAINING ALCOHOL: 2-3 TIMES A WEEK
HOW MANY STANDARD DRINKS CONTAINING ALCOHOL DO YOU HAVE ON A TYPICAL DAY: 1 OR 2

## 2023-02-23 ASSESSMENT — ACTIVITIES OF DAILY LIVING (ADL): CURRENT_FUNCTION: NO ASSISTANCE NEEDED

## 2023-03-02 ENCOUNTER — OFFICE VISIT (OUTPATIENT)
Dept: FAMILY MEDICINE | Facility: CLINIC | Age: 68
End: 2023-03-02
Attending: FAMILY MEDICINE
Payer: COMMERCIAL

## 2023-03-02 VITALS
DIASTOLIC BLOOD PRESSURE: 64 MMHG | HEIGHT: 74 IN | BODY MASS INDEX: 26.26 KG/M2 | HEART RATE: 75 BPM | SYSTOLIC BLOOD PRESSURE: 130 MMHG | OXYGEN SATURATION: 100 % | WEIGHT: 204.6 LBS | RESPIRATION RATE: 16 BRPM | TEMPERATURE: 98.4 F

## 2023-03-02 DIAGNOSIS — I47.10 PAROXYSMAL SUPRAVENTRICULAR TACHYCARDIA (H): ICD-10-CM

## 2023-03-02 DIAGNOSIS — J45.20 MILD INTERMITTENT ASTHMA, UNSPECIFIED WHETHER COMPLICATED: ICD-10-CM

## 2023-03-02 DIAGNOSIS — Z00.00 ENCOUNTER FOR MEDICARE ANNUAL WELLNESS EXAM: Primary | ICD-10-CM

## 2023-03-02 DIAGNOSIS — K21.00 GASTROESOPHAGEAL REFLUX DISEASE WITH ESOPHAGITIS WITHOUT HEMORRHAGE: ICD-10-CM

## 2023-03-02 DIAGNOSIS — L65.9 ALOPECIA: ICD-10-CM

## 2023-03-02 PROBLEM — M48.02 SPINAL STENOSIS OF CERVICAL REGION: Status: ACTIVE | Noted: 2018-01-31

## 2023-03-02 LAB
CHOLEST SERPL-MCNC: 202 MG/DL
HDLC SERPL-MCNC: 60 MG/DL
LDLC SERPL CALC-MCNC: 124 MG/DL
NONHDLC SERPL-MCNC: 142 MG/DL
TRIGL SERPL-MCNC: 92 MG/DL

## 2023-03-02 PROCEDURE — 80061 LIPID PANEL: CPT | Performed by: FAMILY MEDICINE

## 2023-03-02 PROCEDURE — 36415 COLL VENOUS BLD VENIPUNCTURE: CPT | Performed by: FAMILY MEDICINE

## 2023-03-02 PROCEDURE — 99214 OFFICE O/P EST MOD 30 MIN: CPT | Mod: 25 | Performed by: FAMILY MEDICINE

## 2023-03-02 PROCEDURE — 99397 PER PM REEVAL EST PAT 65+ YR: CPT | Performed by: FAMILY MEDICINE

## 2023-03-02 RX ORDER — FINASTERIDE 1 MG/1
1 TABLET, FILM COATED ORAL DAILY
Qty: 90 TABLET | Refills: 3 | Status: SHIPPED | OUTPATIENT
Start: 2023-03-02 | End: 2023-11-16

## 2023-03-02 RX ORDER — DOXYCYCLINE 100 MG/1
CAPSULE ORAL
COMMUNITY
Start: 2022-11-14 | End: 2023-03-02

## 2023-03-02 RX ORDER — TADALAFIL 5 MG/1
1 TABLET ORAL EVERY 24 HOURS
COMMUNITY
Start: 2022-01-13 | End: 2023-07-20

## 2023-03-02 RX ORDER — FEXOFENADINE HCL 180 MG/1
180 TABLET ORAL DAILY
Qty: 90 TABLET | Refills: 3 | Status: SHIPPED | OUTPATIENT
Start: 2023-03-02 | End: 2023-11-16

## 2023-03-02 ASSESSMENT — ENCOUNTER SYMPTOMS
ARTHRALGIAS: 1
SHORTNESS OF BREATH: 1
CONSTIPATION: 0
NERVOUS/ANXIOUS: 0
PALPITATIONS: 0
HEMATOCHEZIA: 0
WEAKNESS: 0
DIARRHEA: 0
FEVER: 0
COUGH: 0
DYSURIA: 0
NAUSEA: 0
HEADACHES: 0
ABDOMINAL PAIN: 0
CHILLS: 0
SORE THROAT: 0
EYE PAIN: 0
HEMATURIA: 0
HEARTBURN: 1
DIZZINESS: 0
MYALGIAS: 1
PARESTHESIAS: 0
JOINT SWELLING: 1
FREQUENCY: 0

## 2023-03-02 ASSESSMENT — ACTIVITIES OF DAILY LIVING (ADL): CURRENT_FUNCTION: NO ASSISTANCE NEEDED

## 2023-03-02 NOTE — PROGRESS NOTES
"SUBJECTIVE:   Christopher is a 67 year old who presents for Preventive Visit.  Patient has been advised of split billing requirements and indicates understanding: Yes  Are you in the first 12 months of your Medicare coverage?  No    Healthy Habits:     In general, how would you rate your overall health?  Good    Frequency of exercise:  6-7 days/week    Duration of exercise:  45-60 minutes    Do you usually eat at least 4 servings of fruit and vegetables a day, include whole grains    & fiber and avoid regularly eating high fat or \"junk\" foods?  Yes    Taking medications regularly:  Yes    Medication side effects:  Other    Ability to successfully perform activities of daily living:  No assistance needed    Home Safety:  Lack of grab bars in the bathroom    Hearing Impairment:  Need to ask people to speak up or repeat themselves    In the past 6 months, have you been bothered by leaking of urine?  No    In general, how would you rate your overall mental or emotional health?  Good      PHQ-2 Total Score: 0    Additional concerns today:  No    Pt would like to have cholesterol checked.     Have you ever done Advance Care Planning? (For example, a Health Directive, POLST, or a discussion with a medical provider or your loved ones about your wishes): Yes, advance care planning is on file.    Fall risk  Fallen 2 or more times in the past year?: No  Any fall with injury in the past year?: Yes    Cognitive Screening   1) Repeat 3 items (Leader, Season, Table)    2) Clock draw: NORMAL  3) 3 item recall: Recalls 3 objects  Results: 3 items recalled: COGNITIVE IMPAIRMENT LESS LIKELY    Mini-CogTM Copyright SIMÓN Ellis. Licensed by the author for use in [facility]; reprinted with permission (chucho@.[facility]). All rights reserved.      Do you have sleep apnea, excessive snoring or daytime drowsiness?: no    Reviewed and updated as needed this visit by clinical staff   Tobacco  Allergies  Meds              Reviewed and " updated as needed this visit by Provider                 Social History     Tobacco Use     Smoking status: Never     Smokeless tobacco: Never   Substance Use Topics     Alcohol use: Yes     Comment: occ         Alcohol Use 2/23/2023   Prescreen: >3 drinks/day or >7 drinks/week? No           Asthma Follow-Up    Was ACT completed today?    Yes    ACT Total Scores 2/23/2023   ACT TOTAL SCORE -   ASTHMA ER VISITS -   ASTHMA HOSPITALIZATIONS -   ACT TOTAL SCORE (Goal Greater than or Equal to 20) 18   In the past 12 months, how many times did you visit the emergency room for your asthma without being admitted to the hospital? 0   In the past 12 months, how many times were you hospitalized overnight because of your asthma? 0       How many days per week do you miss taking your asthma controller medication?  I do not have an asthma controller medication    Please describe any recent triggers for your asthma: upper respiratory infections and allergic rhinitis.    Have you had any Emergency Room Visits, Urgent Care Visits, or Hospital Admissions since your last office visit?  No    GERD : symptoms are controlled, on and off, pt takes omeprazole 20 mg daily for 7 days, then he stops.  Current providers sharing in care for this patient include:   Patient Care Team:  Radha Rosas MD as PCP - General (Family Medicine)  Jerel Prado MD as Assigned Surgical Provider  Radha Rosas MD as Assigned PCP    The following health maintenance items are reviewed in Epic and correct as of today:  Health Maintenance   Topic Date Due     ASTHMA ACTION PLAN  07/14/2022     MEDICARE ANNUAL WELLNESS VISIT  12/09/2022     URINE DRUG SCREEN  12/09/2022     ANNUAL REVIEW OF HM ORDERS  06/01/2023     ASTHMA CONTROL TEST  09/02/2023     FALL RISK ASSESSMENT  03/02/2024     COLORECTAL CANCER SCREENING  06/29/2025     LIPID  12/09/2026     ADVANCE CARE PLANNING  03/02/2028     DTAP/TDAP/TD IMMUNIZATION (4 - Td or Tdap) 01/03/2030     HEPATITIS  C SCREENING  Completed     PHQ-2 (once per calendar year)  Completed     INFLUENZA VACCINE  Completed     Pneumococcal Vaccine: 65+ Years  Completed     ZOSTER IMMUNIZATION  Completed     COVID-19 Vaccine  Completed     IPV IMMUNIZATION  Aged Out     MENINGITIS IMMUNIZATION  Aged Out     AORTIC ANEURYSM SCREENING (SYSTEM ASSIGNED)  Discontinued     BP Readings from Last 3 Encounters:   23 130/64   10/19/22 110/64   22 137/84    Wt Readings from Last 3 Encounters:   23 92.8 kg (204 lb 9.6 oz)   10/19/22 93.6 kg (206 lb 6.4 oz)   22 90.7 kg (200 lb)                  Patient Active Problem List   Diagnosis     Moderate persistent asthma with allergic rhinitis without status asthmaticus with acute exacerbation     Generalized osteoarthrosis, unspecified site     Gastroesophageal reflux disease without esophagitis     Erectile dysfunction     Chronic bilateral low back pain without sciatica     Paroxysmal supraventricular tachycardia (H)     Mild intermittent asthma without complication     Past Surgical History:   Procedure Laterality Date     BACK SURGERY      C6-C7 Fusion     EYE SURGERY      YAG procedure     HYDROCELECTOMY SCROTAL Left 2021    Procedure: Left hydrocelectomy scrotal approach;  Surgeon: Jerel Prado MD;  Location:  OR     [facility] SPLINT  1975    right thumb       Social History     Tobacco Use     Smoking status: Never     Smokeless tobacco: Never   Substance Use Topics     Alcohol use: Yes     Comment: occ     Family History   Problem Relation Age of Onset     C.A.D. Father          at 52 of heart failure         Current Outpatient Medications   Medication Sig Dispense Refill     acetaminophen (TYLENOL) 325 MG tablet Take 325-650 mg by mouth every 6 hours as needed for mild pain       albuterol (PROAIR HFA/PROVENTIL HFA/VENTOLIN HFA) 108 (90 Base) MCG/ACT inhaler INHALE 1 TO 2 PUFFS INTO THE LUNGS EVERY 4 HOURS AS NEEDED FOR SHORTNESS OF BREATH OR  DIFFICULT BREATHING 8.5 g 1     cyclobenzaprine (FLEXERIL) 5 MG tablet Take 1 tablet (5 mg) by mouth 3 times daily as needed for muscle spasms 90 tablet 1     fexofenadine (ALLEGRA) 180 MG tablet Take 1 tablet (180 mg) by mouth daily 90 tablet 3     finasteride (PROPECIA) 1 MG tablet Take 1 tablet (1 mg) by mouth daily 90 tablet 3     ibuprofen (ADVIL/MOTRIN) 600 MG tablet Take 1 tablet (600 mg) by mouth every 6 hours as needed for moderate pain 60 tablet 0     omeprazole (PRILOSEC) 20 MG DR capsule Take 1 capsule (20 mg) by mouth daily 90 capsule 3     tadalafil (CIALIS) 5 MG tablet Take 1 tablet by mouth every 24 hours       zolpidem (AMBIEN) 10 MG tablet Take 0.5 tablets (5 mg) by mouth nightly as needed for sleep Take one half to one tablet at bedtime as needed for sleep 15 tablet 0     Allergies   Allergen Reactions     Gabapentin Palpitations and Shortness Of Breath     Tizanidine Shortness Of Breath     Latex      Other reaction(s): *Unknown  Swelling - localized to eye after eye surger     Pcn [Penicillins]      Noted in 4/21/08 ER, as possible             Review of Systems   Constitutional: Negative for chills and fever.   HENT: Negative for congestion, ear pain, hearing loss and sore throat.    Eyes: Negative for pain and visual disturbance.   Respiratory: Positive for shortness of breath. Negative for cough.    Cardiovascular: Negative for chest pain and palpitations.   Gastrointestinal: Positive for heartburn. Negative for abdominal pain, constipation, diarrhea, hematochezia and nausea.   Genitourinary: Positive for impotence. Negative for dysuria, frequency, genital sores, hematuria, penile discharge and urgency.   Musculoskeletal: Positive for arthralgias, joint swelling and myalgias.   Skin: Negative for rash.   Neurological: Negative for dizziness, weakness, headaches and paresthesias.   Psychiatric/Behavioral: Negative for mood changes. The patient is not nervous/anxious.          OBJECTIVE:   BP  "130/64 (BP Location: Right arm, Patient Position: Sitting, Cuff Size: Adult Large)   Pulse 75   Temp 98.4  F (36.9  C) (Oral)   Resp 16   Ht 1.88 m (6' 2\")   Wt 92.8 kg (204 lb 9.6 oz)   SpO2 100%   BMI 26.27 kg/m   Estimated body mass index is 26.27 kg/m  as calculated from the following:    Height as of this encounter: 1.88 m (6' 2\").    Weight as of this encounter: 92.8 kg (204 lb 9.6 oz).  Physical Exam  GENERAL: healthy, alert and no distress  EYES: Eyes grossly normal to inspection, PERRL and conjunctivae and sclerae normal  HENT: ear canals and TM's normal, nose and mouth without ulcers or lesions  NECK: no adenopathy, no asymmetry, masses, or scars and thyroid normal to palpation  RESP: lungs clear to auscultation - no rales, rhonchi or wheezes  CV: regular rate and rhythm, normal S1 S2, no S3 or S4, no murmur, click or rub, no peripheral edema and peripheral pulses strong  ABDOMEN: soft, nontender, no hepatosplenomegaly, no masses and bowel sounds normal  MS: no gross musculoskeletal defects noted, no edema  SKIN: no suspicious lesions or rashes  NEURO: Normal strength and tone, mentation intact and speech normal  PSYCH: mentation appears normal, affect normal/bright        ASSESSMENT / PLAN:   (Z00.00) Encounter for Medicare annual wellness exam  (primary encounter diagnosis)  Comment: doing excellent, Xray showed calcification of the aorta, pt does have strong family hx of CAD, at the same time, he doesn't wish to be on cholesterol medicine at this time, given the absence of other risk factors, I think it is reasonable not to start on ASA or statins at this time.   The 10-year ASCVD risk score (Anson JOE, et al., 2019) is: 14.1%    Values used to calculate the score:      Age: 67 years      Sex: Male      Is Non- : No      Diabetic: No      Tobacco smoker: No      Systolic Blood Pressure: 130 mmHg      Is BP treated: No      HDL Cholesterol: 72 mg/dL      Total Cholesterol: " "186 mg/dL    Plan: Lipid panel reflex to direct LDL Fasting            (I47.1) Paroxysmal supraventricular tachycardia (H)  Comment: resolved.       (K21.00) Gastroesophageal reflux disease with esophagitis without hemorrhage  Comment: on and off, may use   Plan: omeprazole (PRILOSEC) 20 MG DR capsule            (J45.20) Mild intermittent asthma, unspecified whether complicated  Comment: controlled now (pt did have URI previously), continue on albuterol as needed.   Plan: fexofenadine (ALLEGRA) 180 MG tablet            (L65.9) Alopecia  Comment: continue on   Plan: finasteride (PROPECIA) 1 MG tablet                    COUNSELING:  Reviewed preventive health counseling, as reflected in patient instructions      BMI:   Estimated body mass index is 26.27 kg/m  as calculated from the following:    Height as of this encounter: 1.88 m (6' 2\").    Weight as of this encounter: 92.8 kg (204 lb 9.6 oz).         He reports that he has never smoked. He has never used smokeless tobacco.      Appropriate preventive services were discussed with this patient, including applicable screening as appropriate for cardiovascular disease, diabetes, osteopenia/osteoporosis, and glaucoma.  As appropriate for age/gender, discussed screening for colorectal cancer, prostate cancer, breast cancer, and cervical cancer. Checklist reviewing preventive services available has been given to the patient.    Reviewed patients plan of care and provided an AVS. The Intermediate Care Plan ( asthma action plan, low back pain action plan, and migraine action plan) for Christopher meets the Care Plan requirement. This Care Plan has been established and reviewed with the Patient.      Radha Rosas MD  [facility]    Identified Health Risks:  "

## 2023-03-02 NOTE — PATIENT INSTRUCTIONS
Patient Education   Personalized Prevention Plan  You are due for the preventive services outlined below.  Your care team is available to assist you in scheduling these services.  If you have already completed any of these items, please share that information with your care team to update in your medical record.  Health Maintenance Due   Topic Date Due     Asthma Action Plan - yearly  07/14/2022     Annual Wellness Visit  12/09/2022     URINE DRUG SCREEN  12/09/2022

## 2023-04-05 DIAGNOSIS — G47.9 SLEEP DISORDER: ICD-10-CM

## 2023-04-06 ENCOUNTER — TELEPHONE (OUTPATIENT)
Dept: NURSING | Facility: CLINIC | Age: 68
End: 2023-04-06
Payer: COMMERCIAL

## 2023-04-06 ENCOUNTER — MYC REFILL (OUTPATIENT)
Dept: FAMILY MEDICINE | Facility: CLINIC | Age: 68
End: 2023-04-06
Payer: COMMERCIAL

## 2023-04-06 DIAGNOSIS — G47.9 SLEEP DISORDER: ICD-10-CM

## 2023-04-06 DIAGNOSIS — F51.02 ADJUSTMENT INSOMNIA: Primary | ICD-10-CM

## 2023-04-06 RX ORDER — ZOLPIDEM TARTRATE 10 MG/1
5 TABLET ORAL
Qty: 15 TABLET | Refills: 0 | OUTPATIENT
Start: 2023-04-06

## 2023-04-06 RX ORDER — ZOLPIDEM TARTRATE 10 MG/1
TABLET ORAL
Qty: 15 TABLET | OUTPATIENT
Start: 2023-04-06

## 2023-04-06 NOTE — TELEPHONE ENCOUNTER
Called patient and left voicemail to call back and ask to speak to any triage nurse.    Winter Tuttle RN

## 2023-04-06 NOTE — TELEPHONE ENCOUNTER
Called patient.  Leaving soon for trip to [facility].  Was discussed at 10/19/22 visit and sent that day.  Patient states he did not .  Pharmacy states they do not have RX.  Discussed I believe RX good for 6 months.  [facility] and call back if they say .  Kaya Stockton RN    Refills have been requested for the following medications:         zolpidem (AMBIEN) 10 MG tablet [Radha Rosas]      Patient Comment: I spoke with Dr Rosas about a refill on this during my recent wellness visit for an upcoming vacation, I leave on this trip in a few weeks.     Preferred pharmacy: [id number] - [place], [place] - [address] [facility] [address]    Date/Time Action Taken User Additional Information   10/19/22 1024 Sign Radha Rosas MD Reorder from Order:[id number]   10/19/22 1024 Taking Flag Checked Radha Rosas MD [id number]   23 0824 Taking Flag Checked Henny Goldberg    23 0925 Reorder Radha Rosas MD To Order:[id number]   23 1417 Reorder Gareth Yo To Order:[id number]     Outpatient Medication Detail     Disp Refills Start End ALLI   zolpidem (AMBIEN) 10 MG tablet 15 tablet 0 10/19/2022  No   Sig - Route: Take 0.5 tablets (5 mg) by mouth nightly as needed for sleep Take one half to one tablet at bedtime as needed for sleep - Oral   Sent to pharmacy as: Zolpidem Tartrate 10 MG Oral Tablet (AMBIEN)   Class: E-Prescribe   Order: [id number]   E-Prescribing Status: Receipt confirmed by pharmacy (10/19/2022 10:25 AM CDT)     Medication Administration Instructions    Take one half to one tablet at bedtime as needed for sleep     Pharmacy    [id number] - [place], [place] - [address]  [address] [facility] [address]

## 2023-04-06 NOTE — TELEPHONE ENCOUNTER
Pt returning missed call, pt is requesting zolpidem medication for occasional travel, to help with sleep.   Per chart review, relayed provider recommendation. Pt verbalized understanding and is willing to try something else provider recommends.   Does not want something that will cause drowsiness the next day.     Prefer pharmacy  [id number] [place], [place] - [address] [place] [address] AT [facility] [address]      Graham Griggs, RN, BSN  4/6/2023 at 6:16 PM  [facility]

## 2023-04-07 RX ORDER — TRAZODONE HYDROCHLORIDE 50 MG/1
50 TABLET, FILM COATED ORAL AT BEDTIME
Qty: 30 TABLET | Refills: 0 | Status: SHIPPED | OUTPATIENT
Start: 2023-04-07 | End: 2023-11-01 | Stop reason: SINTOL

## 2023-04-07 NOTE — TELEPHONE ENCOUNTER
One of my favorites is Trazodone, you take it about 2 hours before bedtime, and that should help with sleep.  Let me know his thoughts.

## 2023-04-07 NOTE — TELEPHONE ENCOUNTER
Dr. Rosas   Patient is willing to try the trazodone, please send     RN suggested to take this on a night when he does not have to get up for important things the next day to see if any side effects     Patient is going to [facility] in 3 weeks and would like to use this then as it is hard to sleep away from home   Will try med prior to trip, and let us know if any side effects or unable to tolerate     Agueda Buckley, Registered Nurse  [facility]

## 2023-04-07 NOTE — TELEPHONE ENCOUNTER
This is a duplicate, this RN spoke to patient today 4/7/2023 on different encounter to discuss sleep medication alternative     Agueda Buckley, Registered Nurse  [facility]

## 2023-07-20 ENCOUNTER — E-VISIT (OUTPATIENT)
Dept: FAMILY MEDICINE | Facility: CLINIC | Age: 68
End: 2023-07-20
Payer: COMMERCIAL

## 2023-07-20 ENCOUNTER — MYC MEDICAL ADVICE (OUTPATIENT)
Dept: FAMILY MEDICINE | Facility: CLINIC | Age: 68
End: 2023-07-20

## 2023-07-20 DIAGNOSIS — L64.9 MALE PATTERN ALOPECIA: Primary | ICD-10-CM

## 2023-07-20 DIAGNOSIS — N52.8 OTHER MALE ERECTILE DYSFUNCTION: Primary | ICD-10-CM

## 2023-07-20 PROCEDURE — 99421 OL DIG E/M SVC 5-10 MIN: CPT | Performed by: FAMILY MEDICINE

## 2023-07-20 NOTE — PATIENT INSTRUCTIONS
Magen White:  Although Minoxidil oral can help with hair loss,  it does have many side effects, including heart problem, and so far I don't recommend using it orally, but I wonder if you would like to use it topically (on the scalp), along with your Finasteride? That will also help with your hair loss.  I sent a prescription for minoxidil topically for you to use on the scalp twice daily.   Let me know your thoughts.  Radha Rosas MD  [facility].   [phone]

## 2023-07-20 NOTE — TELEPHONE ENCOUNTER
Routing refill request to provider for review/approval because:  Medication is reported/historical (previously prescribed by Dr. Chavez)     Thank you   Agueda Buckley, Registered Nurse  [facility]

## 2023-07-24 RX ORDER — TADALAFIL 5 MG/1
5 TABLET ORAL EVERY 24 HOURS
Qty: 30 TABLET | Refills: 1 | Status: SHIPPED | OUTPATIENT
Start: 2023-07-24 | End: 2023-07-25

## 2023-07-25 RX ORDER — TADALAFIL 5 MG/1
5 TABLET ORAL EVERY 24 HOURS
Qty: 30 TABLET | Refills: 1 | Status: SHIPPED | OUTPATIENT
Start: 2023-07-25

## 2023-07-25 NOTE — TELEPHONE ENCOUNTER
Responded to pt Gig[employer]at message informing pt of rx sent to [facility] pharmacy in [place], [place].    Winter EUGENE RN

## 2023-07-25 NOTE — TELEPHONE ENCOUNTER
T'd up med and pharmacy and routing refill request to refill pool to be processed in order.    Winter EUGENE RN

## 2023-08-22 ENCOUNTER — IMMUNIZATION (OUTPATIENT)
Dept: FAMILY MEDICINE | Facility: CLINIC | Age: 68
End: 2023-08-22
Payer: COMMERCIAL

## 2023-08-22 DIAGNOSIS — Z23 HIGH PRIORITY FOR 2019-NCOV VACCINE: Primary | ICD-10-CM

## 2023-08-22 PROCEDURE — 91313 COVID-19 BIVALENT 18+ (MODERNA): CPT

## 2023-08-22 PROCEDURE — 99207 PR NO CHARGE NURSE ONLY: CPT

## 2023-08-22 PROCEDURE — 0134A COVID-19 BIVALENT 18+ (MODERNA): CPT

## 2023-11-01 ENCOUNTER — OFFICE VISIT (OUTPATIENT)
Dept: FAMILY MEDICINE | Facility: CLINIC | Age: 68
End: 2023-11-01
Payer: COMMERCIAL

## 2023-11-01 ENCOUNTER — TELEPHONE (OUTPATIENT)
Dept: FAMILY MEDICINE | Facility: CLINIC | Age: 68
End: 2023-11-01

## 2023-11-01 VITALS
SYSTOLIC BLOOD PRESSURE: 121 MMHG | HEIGHT: 75 IN | WEIGHT: 203.8 LBS | DIASTOLIC BLOOD PRESSURE: 79 MMHG | BODY MASS INDEX: 25.34 KG/M2 | HEART RATE: 90 BPM | OXYGEN SATURATION: 96 % | TEMPERATURE: 98.2 F

## 2023-11-01 DIAGNOSIS — R05.1 ACUTE COUGH: ICD-10-CM

## 2023-11-01 DIAGNOSIS — J45.41: Primary | ICD-10-CM

## 2023-11-01 DIAGNOSIS — L64.9 MALE PATTERN ALOPECIA: ICD-10-CM

## 2023-11-01 PROCEDURE — 99213 OFFICE O/P EST LOW 20 MIN: CPT | Performed by: PHYSICIAN ASSISTANT

## 2023-11-01 RX ORDER — ALBUTEROL SULFATE 90 UG/1
AEROSOL, METERED RESPIRATORY (INHALATION)
Qty: 8.5 G | Refills: 1 | Status: SHIPPED | OUTPATIENT
Start: 2023-11-01 | End: 2024-03-22

## 2023-11-01 RX ORDER — DOXYCYCLINE 100 MG/1
100 CAPSULE ORAL 2 TIMES DAILY
Qty: 20 CAPSULE | Refills: 0 | Status: SHIPPED | OUTPATIENT
Start: 2023-11-01 | End: 2023-11-11

## 2023-11-01 ASSESSMENT — ASTHMA QUESTIONNAIRES
QUESTION_5 LAST FOUR WEEKS HOW WOULD YOU RATE YOUR ASTHMA CONTROL: SOMEWHAT CONTROLLED
ACT_TOTALSCORE: 18
QUESTION_2 LAST FOUR WEEKS HOW OFTEN HAVE YOU HAD SHORTNESS OF BREATH: ONCE OR TWICE A WEEK
QUESTION_3 LAST FOUR WEEKS HOW OFTEN DID YOUR ASTHMA SYMPTOMS (WHEEZING, COUGHING, SHORTNESS OF BREATH, CHEST TIGHTNESS OR PAIN) WAKE YOU UP AT NIGHT OR EARLIER THAN USUAL IN THE MORNING: ONCE OR TWICE
QUESTION_4 LAST FOUR WEEKS HOW OFTEN HAVE YOU USED YOUR RESCUE INHALER OR NEBULIZER MEDICATION (SUCH AS ALBUTEROL): ONE OR TWO TIMES PER DAY
ACT_TOTALSCORE: 18
QUESTION_1 LAST FOUR WEEKS HOW MUCH OF THE TIME DID YOUR ASTHMA KEEP YOU FROM GETTING AS MUCH DONE AT WORK, SCHOOL OR AT HOME: NONE OF THE TIME

## 2023-11-01 NOTE — TELEPHONE ENCOUNTER
Reason for Call:  Appointment Request    Patient requesting this type of appt:  Office visit    Requested provider: Radha Rosas    Reason patient unable to be scheduled: Not within requested timeframe    When does patient want to be seen/preferred time: Same day    Comments: patient called and is requsting to get worked in for an ongoing cough that is not getting any better it does not help his COPD per pt has had it for a few weeks    Could we send this information to you in [facility] or would you prefer to receive a phone call?:   Patient would prefer a phone call   Okay to leave a detailed message?: Yes at Home number on file [phone] (home)    Call taken on 11/1/2023 at 8:40 AM by Claudia Mcdonald

## 2023-11-01 NOTE — PROGRESS NOTES
Assessment & Plan     Moderate persistent asthma with allergic rhinitis without status asthmaticus with acute exacerbation  Treated with albuterol and doxycycline as noted below.  - albuterol (PROAIR HFA/PROVENTIL HFA/VENTOLIN HFA) 108 (90 Base) MCG/ACT inhaler; INHALE 1 TO 2 PUFFS INTO THE LUNGS EVERY 4 HOURS AS NEEDED FOR SHORTNESS OF BREATH OR DIFFICULT BREATHING  - doxycycline hyclate (VIBRAMYCIN) 100 MG capsule; Take 1 capsule (100 mg) by mouth 2 times daily for 10 days    Acute cough    - doxycycline hyclate (VIBRAMYCIN) 100 MG capsule; Take 1 capsule (100 mg) by mouth 2 times daily for 10 days    Male pattern alopecia  Patient interested in possible oral minoxidil. I recommended visit with dermatology to discuss if he is interested in this. He declines referral today.    Prescription drug management          Ewa Jim PA-C  [facility]    Linette White is a 68 year old, presenting for the following health issues:  URI (C/o cough, uri sx X 1wk)      11/1/2023    10:46 AM   Additional Questions   Roomed by Erin   Accompanied by Self       URI    History of Present Illness       Reason for visit:  I came down with a respiratory illness on 10/25/23 and have been taking Mucinex or NyQuil but I am now coughing up green phlegm and I'm not getting better  Symptom onset:  3-7 days ago  Symptoms include:  Persistent cough with green phlegm  Symptom intensity:  Severe  Symptom progression:  Worsening  Had these symptoms before:  Yes  Has tried/received treatment for these symptoms:  Yes  Previous treatment was successful:  Yes  Prior treatment description:  The doctor prescribed antibiotics that cleared up my symptoms within a few days    He eats 2-3 servings of fruits and vegetables daily.He consumes 2 sweetened beverage(s) daily.He exercises with enough effort to increase his heart rate 30 to 60 minutes per day.  He exercises with enough effort to increase his heart rate 5  "days per week.   He is taking medications regularly.         Acute Illness  Acute illness concerns: ROSEANNA alfonso, has asthma history and often gets these illnesses 1-2 times a year.  Onset/Duration: 1 week  Symptoms:  Fever: No  Chills/Sweats: YES- sweats  Headache (location?): No  Sinus Pressure: No  Conjunctivitis:  No  Ear Pain: no  Rhinorrhea: YES  Congestion: YES  Sore Throat: YES  Cough: YES-productive of green sputum with mucous, getting worse (last night)  Wheeze: Hx asthma and trouble breathing  Decreased Appetite: YES  Nausea: No  Vomiting: No  Diarrhea: YES  Dysuria/Freq.: No  Dysuria or Hematuria: No  Fatigue/Achiness: YES  Sick/Strep Exposure: YES- Spouse-sore throat, cough, H/A, fatigue&body ache  Therapies tried and outcome: Dayquil/Nyquil and Mucinex    Had doxycycline last Fall which helped a lot for his symptoms.      Review of Systems   GENERAL:  No fevers  RESP:  As noted in HPI        Objective    /79 (BP Location: Right arm, Patient Position: Sitting, Cuff Size: Adult Regular)   Pulse 90   Temp 98.2  F (36.8  C) (Oral)   Ht 1.905 m (6' 3\")   Wt 92.4 kg (203 lb 12.8 oz)   SpO2 96%   BMI 25.47 kg/m    Body mass index is 25.47 kg/m .  Physical Exam   GENERAL: No acute distress  HEENT: Normocephalic  CARDIAC: Regular rate and rhythm. No murmurs.  PULMONARY: Occasional wheeze and rhonchi, no crackles.  NEURO: Alert and non-focal                "

## 2023-11-06 ENCOUNTER — MYC REFILL (OUTPATIENT)
Dept: FAMILY MEDICINE | Facility: CLINIC | Age: 68
End: 2023-11-06
Payer: COMMERCIAL

## 2023-11-06 DIAGNOSIS — K21.00 GASTROESOPHAGEAL REFLUX DISEASE WITH ESOPHAGITIS WITHOUT HEMORRHAGE: ICD-10-CM

## 2023-11-06 DIAGNOSIS — J45.20 MILD INTERMITTENT ASTHMA, UNSPECIFIED WHETHER COMPLICATED: ICD-10-CM

## 2023-11-06 RX ORDER — FEXOFENADINE HYDROCHLORIDE 180 MG/1
TABLET, FILM COATED ORAL
Qty: 90 TABLET | Refills: 3 | OUTPATIENT
Start: 2023-11-06

## 2023-11-06 NOTE — TELEPHONE ENCOUNTER
Has refills at mail order, see [employer] message, sent [employer] response, routed to triage to await pt response  Alix Weaver RN, BSN  [facility]

## 2023-11-07 RX ORDER — FEXOFENADINE HCL 180 MG/1
180 TABLET ORAL DAILY
Qty: 90 TABLET | Refills: 3 | OUTPATIENT
Start: 2023-11-07

## 2023-11-07 NOTE — TELEPHONE ENCOUNTER
Patient has not yet viewed my chart     Voicemail message left to return call to triage nurse     Agueda Buckley, Registered Nurse  [facility]

## 2023-11-07 NOTE — TELEPHONE ENCOUNTER
Patient calling back.  Will get from mail order.  He did submit request but have not shipped yet.  He will call if needs local supply til mail order arrives.  Kaya Stockton RN

## 2023-11-14 ENCOUNTER — MYC MEDICAL ADVICE (OUTPATIENT)
Dept: FAMILY MEDICINE | Facility: CLINIC | Age: 68
End: 2023-11-14
Payer: COMMERCIAL

## 2023-11-14 DIAGNOSIS — L65.9 ALOPECIA: ICD-10-CM

## 2023-11-14 DIAGNOSIS — J45.20 MILD INTERMITTENT ASTHMA, UNSPECIFIED WHETHER COMPLICATED: ICD-10-CM

## 2023-11-14 DIAGNOSIS — K21.00 GASTROESOPHAGEAL REFLUX DISEASE WITH ESOPHAGITIS WITHOUT HEMORRHAGE: ICD-10-CM

## 2023-11-16 RX ORDER — FINASTERIDE 1 MG/1
1 TABLET, FILM COATED ORAL DAILY
Qty: 90 TABLET | Refills: 1 | Status: SHIPPED | OUTPATIENT
Start: 2023-11-16 | End: 2024-06-24

## 2023-11-16 RX ORDER — FEXOFENADINE HCL 180 MG/1
180 TABLET ORAL DAILY
Qty: 90 TABLET | Refills: 1 | Status: SHIPPED | OUTPATIENT
Start: 2023-11-16

## 2023-11-17 ENCOUNTER — ALLIED HEALTH/NURSE VISIT (OUTPATIENT)
Dept: FAMILY MEDICINE | Facility: CLINIC | Age: 68
End: 2023-11-17
Payer: COMMERCIAL

## 2023-11-17 DIAGNOSIS — Z23 NEED FOR PROPHYLACTIC VACCINATION AND INOCULATION AGAINST INFLUENZA: Primary | ICD-10-CM

## 2023-11-17 PROCEDURE — 90662 IIV NO PRSV INCREASED AG IM: CPT

## 2023-11-17 PROCEDURE — 99207 PR NO CHARGE NURSE ONLY: CPT

## 2023-11-17 PROCEDURE — 90471 IMMUNIZATION ADMIN: CPT

## 2024-03-21 DIAGNOSIS — J45.41: ICD-10-CM

## 2024-03-22 RX ORDER — ALBUTEROL SULFATE 90 UG/1
AEROSOL, METERED RESPIRATORY (INHALATION)
Qty: 8.5 G | Refills: 1 | Status: SHIPPED | OUTPATIENT
Start: 2024-03-22

## 2024-06-01 ENCOUNTER — HEALTH MAINTENANCE LETTER (OUTPATIENT)
Age: 69
End: 2024-06-01

## 2024-06-19 ENCOUNTER — TELEPHONE (OUTPATIENT)
Dept: FAMILY MEDICINE | Facility: CLINIC | Age: 69
End: 2024-06-19
Payer: COMMERCIAL

## 2024-06-19 NOTE — TELEPHONE ENCOUNTER
Prior Auth Needed:     Disp Refills Start End ALLI   finasteride (PROPECIA) 1 MG tablet 90 tablet 1 2023 -- No   Sig - Route: Take 1 tablet (1 mg) by mouth daily - Oral   Sent to pharmacy as: Finasteride 1 MG Oral Tablet (PROPECIA)   Class: E-Prescribe   Order: [id number]       Key: [id number]  Patient Last Name: Dewayne  : 1955

## 2024-06-24 DIAGNOSIS — L65.9 ALOPECIA: ICD-10-CM

## 2024-06-24 RX ORDER — FINASTERIDE 1 MG/1
1 TABLET, FILM COATED ORAL DAILY
Qty: 90 TABLET | Refills: 0 | Status: SHIPPED | OUTPATIENT
Start: 2024-06-24

## 2024-06-24 NOTE — TELEPHONE ENCOUNTER
Spoke with patient.  Patient is now at [facility] with a new PCP.  Holley Keys TC

## 2024-06-25 NOTE — TELEPHONE ENCOUNTER
Central Prior Authorization Team   Phone: [phone]    PA Initiation    Medication: Finasteride 1 MG Tablets  Insurance Company: [facility] - Phone [phone] Fax [phone]  Pharmacy Filling the Rx: [id number] [place], [place] - [address] [place] [address] AT [address]  Filling Pharmacy Phone: [phone]  Filling Pharmacy Fax:    Start Date: 6/25/2024

## 2024-06-26 NOTE — TELEPHONE ENCOUNTER
PRIOR AUTHORIZATION DENIED    Medication: Finasteride 1 MG Tablets    Denial Date: 6/26/2024    Denial Rational:  Per insurance, medication is excluded from patient's benefit plan and will not be covered. Review and appeal are not available because of this exclusion.          Appeal Information:  N/A

## 2024-06-27 NOTE — TELEPHONE ENCOUNTER
Called patient.  Advised of PA denial/plan exclusion.  Patient states it has never been covered and he pays mccarthy for med.  Kaya Stockton RN      June 24, 2024  Holley Proctor    6/24/24 12:22 PM  Note  Spoke with patient.  Patient is now at [facility] with a new PCP.  RODRI Little Jane K, MD JO    6/24/24 12:12 PM  Note  Due for med check and wellness visit - gave 90 days

## 2024-06-27 NOTE — TELEPHONE ENCOUNTER
It sounds like patient is seeing a new provider at [facility]. Please inform him that the finasteride prior authorization was denied. It is an exclusion on his medical plan.

## 2024-08-29 ENCOUNTER — PATIENT OUTREACH (OUTPATIENT)
Dept: FAMILY MEDICINE | Facility: CLINIC | Age: 69
End: 2024-08-29
Payer: COMMERCIAL

## 2024-08-29 NOTE — TELEPHONE ENCOUNTER
Patient Quality Outreach    Patient is due for the following:   Physical Annual Wellness Visit    Next Steps:   Schedule a Annual Wellness Visit    Type of outreach:    Sent [employer] message.    Next Steps:  Reach out within 90 days via Letter.    Max number of attempts reached: No. Will try again in 90 days if patient still on fail list.    Questions for provider review:    None           Meeta Carias, [facility]

## 2024-10-08 DIAGNOSIS — J45.41: ICD-10-CM

## 2024-10-08 RX ORDER — ALBUTEROL SULFATE 90 UG/1
INHALANT RESPIRATORY (INHALATION)
Qty: 8.5 G | Refills: 0 | Status: SHIPPED | OUTPATIENT
Start: 2024-10-08 | End: 2024-10-08

## 2024-10-08 NOTE — TELEPHONE ENCOUNTER
Pt states he has transferred care and no longer comes to this clinic. He will let the pharmacy know.

## 2025-02-18 DIAGNOSIS — K21.00 GASTROESOPHAGEAL REFLUX DISEASE WITH ESOPHAGITIS WITHOUT HEMORRHAGE: ICD-10-CM

## 2025-02-18 RX ORDER — OMEPRAZOLE 20 MG/1
20 CAPSULE, DELAYED RELEASE ORAL DAILY
Qty: 90 CAPSULE | Refills: 1 | OUTPATIENT
Start: 2025-02-18

## 2025-02-18 NOTE — TELEPHONE ENCOUNTER
RN spoke to patient who informs he is no longer seeing Dr. Rosas. His new primary provider is Dr. Florentino Flores at [facility] in [place]. Medication refused.     CARYN Lipscomb, RN  [facility]

## 2025-06-14 ENCOUNTER — HEALTH MAINTENANCE LETTER (OUTPATIENT)
Age: 70
End: 2025-06-14

## (undated) DEVICE — MANIFOLD NEPTUNE 4 PORT 700-20

## (undated) DEVICE — PREP POVIDONE-IODINE 7.5% SCRUB 4OZ BOTTLE MDS093945

## (undated) DEVICE — SU CHROMIC 3-0 PS-2 27" 1638H

## (undated) DEVICE — SOL NACL 0.9% IRRIG 1000ML BOTTLE 2F7124

## (undated) DEVICE — ESU ELEC BLADE 2.75" COATED/INSULATED E1455

## (undated) DEVICE — DRAIN PENROSE 0.25"X12" LATEX FREE GR101

## (undated) DEVICE — ESU GROUND PAD ADULT W/CORD E7507

## (undated) DEVICE — SUPPORTER ATHLETIC LG LATEX 202636

## (undated) DEVICE — LINEN HALF SHEET 5512

## (undated) DEVICE — DRSG KERLIX FLUFFS X5

## (undated) DEVICE — LINEN FULL SHEET 5511

## (undated) DEVICE — SUCTION TIP YANKAUER W/O VENT K86

## (undated) DEVICE — TUBING SUCTION 12"X1/4" N612

## (undated) DEVICE — PACK MINOR CUSTOM RIDGES SBA32RMRMA

## (undated) DEVICE — LINEN TOWEL PACK X10 5473

## (undated) DEVICE — SU VICRYL 2-0 SH 27" J317H

## (undated) DEVICE — PREP POVIDONE IODINE SOLUTION 10% 4OZ BOTTLE 29906-004

## (undated) DEVICE — SU SILK 2-0 PSL 18" 673H

## (undated) DEVICE — BAG CLEAR TRASH 1.3M 39X33" P4040C

## (undated) DEVICE — SU VICRYL 0 CT-2 27" UND J270H

## (undated) DEVICE — PREP SKIN SCRUB TRAY 4461A

## (undated) DEVICE — BLADE CLIPPER 3M 9670

## (undated) DEVICE — GLOVE PROTEXIS POWDER FREE SMT 7.5  2D72PT75X

## (undated) DEVICE — SPONGE RAY-TEC 4X8" 7318

## (undated) RX ORDER — ONDANSETRON 2 MG/ML
INJECTION INTRAMUSCULAR; INTRAVENOUS
Status: DISPENSED
Start: 2021-08-16

## (undated) RX ORDER — ACETAMINOPHEN 325 MG/1
TABLET ORAL
Status: DISPENSED
Start: 2021-08-16

## (undated) RX ORDER — FENTANYL CITRATE 50 UG/ML
INJECTION, SOLUTION INTRAMUSCULAR; INTRAVENOUS
Status: DISPENSED
Start: 2021-08-16

## (undated) RX ORDER — CEFAZOLIN SODIUM 2 G/100ML
INJECTION, SOLUTION INTRAVENOUS
Status: DISPENSED
Start: 2021-08-16

## (undated) RX ORDER — BUPIVACAINE HYDROCHLORIDE 5 MG/ML
INJECTION, SOLUTION EPIDURAL; INTRACAUDAL
Status: DISPENSED
Start: 2021-08-16

## (undated) RX ORDER — LIDOCAINE HYDROCHLORIDE 10 MG/ML
INJECTION, SOLUTION EPIDURAL; INFILTRATION; INTRACAUDAL; PERINEURAL
Status: DISPENSED
Start: 2021-08-16

## (undated) RX ORDER — GINSENG 100 MG
CAPSULE ORAL
Status: DISPENSED
Start: 2021-08-16

## (undated) RX ORDER — PROPOFOL 10 MG/ML
INJECTION, EMULSION INTRAVENOUS
Status: DISPENSED
Start: 2021-08-16

## (undated) RX ORDER — DEXAMETHASONE SODIUM PHOSPHATE 4 MG/ML
INJECTION, SOLUTION INTRA-ARTICULAR; INTRALESIONAL; INTRAMUSCULAR; INTRAVENOUS; SOFT TISSUE
Status: DISPENSED
Start: 2021-08-16

## (undated) RX ORDER — OXYCODONE HYDROCHLORIDE 5 MG/1
TABLET ORAL
Status: DISPENSED
Start: 2021-08-16